# Patient Record
Sex: MALE | Race: BLACK OR AFRICAN AMERICAN | NOT HISPANIC OR LATINO | Employment: FULL TIME | URBAN - METROPOLITAN AREA
[De-identification: names, ages, dates, MRNs, and addresses within clinical notes are randomized per-mention and may not be internally consistent; named-entity substitution may affect disease eponyms.]

---

## 2018-02-14 ENCOUNTER — OFFICE VISIT CONVERTED (OUTPATIENT)
Dept: ORTHOPEDIC SURGERY | Facility: CLINIC | Age: 48
End: 2018-02-14
Attending: ORTHOPAEDIC SURGERY

## 2018-02-28 ENCOUNTER — OFFICE VISIT CONVERTED (OUTPATIENT)
Dept: ORTHOPEDIC SURGERY | Facility: CLINIC | Age: 48
End: 2018-02-28
Attending: PHYSICIAN ASSISTANT

## 2018-10-16 ENCOUNTER — OFFICE VISIT CONVERTED (OUTPATIENT)
Dept: CARDIOLOGY | Facility: CLINIC | Age: 48
End: 2018-10-16
Attending: INTERNAL MEDICINE

## 2019-07-17 ENCOUNTER — OFFICE VISIT CONVERTED (OUTPATIENT)
Dept: CARDIOLOGY | Facility: CLINIC | Age: 49
End: 2019-07-17
Attending: INTERNAL MEDICINE

## 2019-11-14 ENCOUNTER — CONVERSION ENCOUNTER (OUTPATIENT)
Dept: SURGERY | Facility: CLINIC | Age: 49
End: 2019-11-14

## 2019-11-14 ENCOUNTER — OFFICE VISIT CONVERTED (OUTPATIENT)
Dept: SURGERY | Facility: CLINIC | Age: 49
End: 2019-11-14
Attending: NURSE PRACTITIONER

## 2020-01-10 ENCOUNTER — HOSPITAL ENCOUNTER (OUTPATIENT)
Dept: GASTROENTEROLOGY | Facility: HOSPITAL | Age: 50
Setting detail: HOSPITAL OUTPATIENT SURGERY
Discharge: HOME | End: 2020-01-10
Attending: SURGERY

## 2020-01-28 ENCOUNTER — OFFICE VISIT CONVERTED (OUTPATIENT)
Dept: SURGERY | Facility: CLINIC | Age: 50
End: 2020-01-28
Attending: SURGERY

## 2020-01-30 ENCOUNTER — OFFICE VISIT CONVERTED (OUTPATIENT)
Dept: CARDIOLOGY | Facility: CLINIC | Age: 50
End: 2020-01-30
Attending: INTERNAL MEDICINE

## 2020-03-17 ENCOUNTER — HOSPITAL ENCOUNTER (EMERGENCY)
Age: 50
Discharge: HOME | End: 2020-03-18
Attending: EMERGENCY MEDICINE
Payer: SELF-PAY

## 2020-03-17 VITALS
TEMPERATURE: 97.7 F | RESPIRATION RATE: 17 BRPM | BODY MASS INDEX: 23.11 KG/M2 | HEART RATE: 67 BPM | OXYGEN SATURATION: 100 % | DIASTOLIC BLOOD PRESSURE: 89 MMHG | WEIGHT: 156 LBS | HEIGHT: 69 IN | SYSTOLIC BLOOD PRESSURE: 124 MMHG

## 2020-03-17 DIAGNOSIS — M54.50 CHRONIC LEFT-SIDED LOW BACK PAIN WITHOUT SCIATICA: ICD-10-CM

## 2020-03-17 DIAGNOSIS — G89.29 CHRONIC LEFT-SIDED LOW BACK PAIN WITHOUT SCIATICA: ICD-10-CM

## 2020-03-17 DIAGNOSIS — J02.9 PHARYNGITIS, UNSPECIFIED ETIOLOGY: Primary | ICD-10-CM

## 2020-03-17 PROCEDURE — 99282 EMERGENCY DEPT VISIT SF MDM: CPT

## 2020-03-17 PROCEDURE — 87081 CULTURE SCREEN ONLY: CPT

## 2020-03-17 RX ORDER — IBUPROFEN 600 MG/1
600 TABLET ORAL
Qty: 20 TAB | Refills: 0 | Status: SHIPPED | OUTPATIENT
Start: 2020-03-17

## 2020-03-17 RX ORDER — LIDOCAINE 50 MG/G
PATCH TOPICAL
Qty: 30 EACH | Refills: 0 | Status: SHIPPED | OUTPATIENT
Start: 2020-03-17

## 2020-03-18 NOTE — DISCHARGE INSTRUCTIONS
Patient Education        Back Pain: Care Instructions  Your Care Instructions    Back pain has many possible causes. It is often related to problems with muscles and ligaments of the back. It may also be related to problems with the nerves, discs, or bones of the back. Moving, lifting, standing, sitting, or sleeping in an awkward way can strain the back. Sometimes you don't notice the injury until later. Arthritis is another common cause of back pain. Although it may hurt a lot, back pain usually improves on its own within several weeks. Most people recover in 12 weeks or less. Using good home treatment and being careful not to stress your back can help you feel better sooner. Follow-up care is a key part of your treatment and safety. Be sure to make and go to all appointments, and call your doctor if you are having problems. It's also a good idea to know your test results and keep a list of the medicines you take. How can you care for yourself at home? · Sit or lie in positions that are most comfortable and reduce your pain. Try one of these positions when you lie down:  ? Lie on your back with your knees bent and supported by large pillows. ? Lie on the floor with your legs on the seat of a sofa or chair. ? Lie on your side with your knees and hips bent and a pillow between your legs. ? Lie on your stomach if it does not make pain worse. · Do not sit up in bed, and avoid soft couches and twisted positions. Bed rest can help relieve pain at first, but it delays healing. Avoid bed rest after the first day of back pain. · Change positions every 30 minutes. If you must sit for long periods of time, take breaks from sitting. Get up and walk around, or lie in a comfortable position. · Try using a heating pad on a low or medium setting for 15 to 20 minutes every 2 or 3 hours. Try a warm shower in place of one session with the heating pad. · You can also try an ice pack for 10 to 15 minutes every 2 to 3 hours. Put a thin cloth between the ice pack and your skin. · Take pain medicines exactly as directed. ? If the doctor gave you a prescription medicine for pain, take it as prescribed. ? If you are not taking a prescription pain medicine, ask your doctor if you can take an over-the-counter medicine. · Take short walks several times a day. You can start with 5 to 10 minutes, 3 or 4 times a day, and work up to longer walks. Walk on level surfaces and avoid hills and stairs until your back is better. · Return to work and other activities as soon as you can. Continued rest without activity is usually not good for your back. · To prevent future back pain, do exercises to stretch and strengthen your back and stomach. Learn how to use good posture, safe lifting techniques, and proper body mechanics. When should you call for help? Call your doctor now or seek immediate medical care if:    · You have new or worsening numbness in your legs.     · You have new or worsening weakness in your legs. (This could make it hard to stand up.)     · You lose control of your bladder or bowels.    Watch closely for changes in your health, and be sure to contact your doctor if:    · You have a fever, lose weight, or don't feel well.     · You do not get better as expected. Where can you learn more? Go to http://yunier-myla.info/  Enter I594 in the search box to learn more about \"Back Pain: Care Instructions. \"  Current as of: June 26, 2019Content Version: 12.4  © 7949-5379 Healthwise, Incorporated. Care instructions adapted under license by [employer] (which disclaims liability or warranty for this information). If you have questions about a medical condition or this instruction, always ask your healthcare professional. [address] any warranty or liability for your use of this information. [employer] Activation    Thank you for requesting access to [employer].  Please follow the instructions below to securely access and download your online medical record. [employer] allows you to send messages to your doctor, view your test results, renew your prescriptions, schedule appointments, and more. How Do I Sign Up? 1. In your internet browser, go to [email or web address]  2. Click on the First Time User? Click Here link in the Sign In box. You will be redirect to the New Member Sign Up page. 3. Enter your [employer] Access Code exactly as it appears below. You will not need to use this code after youve completed the sign-up process. If you do not sign up before the expiration date, you must request a new code. [employer] Access Code: [email or web address]  Expires: 2020 10:24 PM (This is the date your [employer] access code will )    4. Enter the last four digits of your Social Security Number (xxxx) and Date of Birth (mm/dd/yyyy) as indicated and click Submit. You will be taken to the next sign-up page. 5. Create a CrossCore ID. This will be your [employer] login ID and cannot be changed, so think of one that is secure and easy to remember. 6. Create a [employer] password. You can change your password at any time. 7. Enter your Password Reset Question and Answer. This can be used at a later time if you forget your password. 8. Enter your e-mail address. You will receive e-mail notification when new information is available in [address] 9. Click Sign Up. You can now view and download portions of your medical record. 10. Click the Download Summary menu link to download a portable copy of your medical information. Additional Information    If you have questions, please visit the Frequently Asked Questions section of the [employer] website at [email or web address] Remember, [employer] is NOT to be used for urgent needs. For medical emergencies, dial 911.

## 2020-03-18 NOTE — ED PROVIDER NOTES
EMERGENCY DEPARTMENT HISTORY AND PHYSICAL EXAM    Date: 3/17/2020  Patient Name: Jude Mejia    History of Presenting Illness     Chief Complaint   Patient presents with    Sore Throat    Other         History Provided By: patient     Chief Complaint: sore throat   Duration: 1 day  Timing: acute  Location: throat   Quality: sore  Severity: mild  Modifying Factors: none  Associated Symptoms: chronic back pain      Additional History (Context): Jude Mejia is a 47 y.o. male with no documented past medical history who presents with complaints of a sore throat that began earlier today. Patient denies fever, chills, cough, congestion chest pain, shortness of breath. Patient also reports left mid to lower back pain that has been present for 5 years. Patient states his pain worsens when he bends and lifts. Patient does report doing a lot of bending and lifting at his job. Denies any recent fall or trauma. Denies urinary sx or taking any meds for his sx. No other complaints reported at this time. PCP: None        Past History     Past Medical History:  No past medical history on file. Past Surgical History:  No past surgical history on file. Family History:  No family history on file. Social History:  Social History     Tobacco Use    Smoking status: Not on file   Substance Use Topics    Alcohol use: Not on file    Drug use: Not on file       Allergies:  No Known Allergies      Review of Systems   Review of Systems   Constitutional: Negative. Negative for chills and fever. HENT: Positive for sore throat. Negative for congestion, ear pain and rhinorrhea. Eyes: Negative. Negative for pain and redness. Respiratory: Negative. Negative for cough, shortness of breath, wheezing and stridor. Cardiovascular: Negative. Negative for chest pain and leg swelling. Gastrointestinal: Negative. Negative for abdominal pain, constipation, diarrhea, nausea and vomiting. Genitourinary: Negative. Negative for dysuria and frequency. Musculoskeletal: Positive for back pain. Negative for neck pain. Skin: Negative. Negative for rash and wound. Neurological: Negative. Negative for dizziness, seizures, syncope and headaches. All other systems reviewed and are negative. All Other Systems Negative  Physical Exam     Vitals:    03/17/20 2224   BP: 124/89   Pulse: 67   Resp: 17   Temp: 97.7 °F (36.5 °C)   SpO2: 100%   Weight: 70.8 kg (156 lb)   Height: 5' 9\" (1.753 m)     Physical Exam  Vitals signs and nursing note reviewed. Constitutional:       General: He is not in acute distress. Appearance: He is well-developed. He is not diaphoretic. HENT:      Head: Normocephalic and atraumatic. Nose: Nose normal.      Mouth/Throat:      Mouth: Mucous membranes are moist.      Pharynx: Posterior oropharyngeal erythema present. No oropharyngeal exudate. Comments: Oropharynx is mildly erythematous without exudates or tonsillar enlargement. Airway is patent. Patient is able to clear secretions. Eyes:      General: No scleral icterus. Right eye: No discharge. Left eye: No discharge. Conjunctiva/sclera: Conjunctivae normal.   Neck:      Musculoskeletal: Normal range of motion and neck supple. Cardiovascular:      Rate and Rhythm: Normal rate and regular rhythm. Heart sounds: Normal heart sounds. No murmur. No friction rub. No gallop. Pulmonary:      Effort: Pulmonary effort is normal. No respiratory distress. Breath sounds: Normal breath sounds. No stridor. No wheezing or rales. Abdominal:      Tenderness: There is no abdominal tenderness. There is no guarding. Musculoskeletal: Normal range of motion. Comments: No midline tenderness palpation noted along the spine. Mild tenderness to palpation and hypertonicity noted to the left lumbar erector spinae muscles.   Range of motion of the spine is intact without evidence of radiculopathy. Skin:     General: Skin is warm and dry. Findings: No erythema or rash. Neurological:      Mental Status: He is alert and oriented to person, place, and time. Coordination: Coordination normal.      Comments: Gait is steady and patient exhibits no evidence of ataxia. Patient is able to ambulate without difficulty. No focal neurological deficit noted. No facial droop, slurred speech, or evidence of altered mentation noted on exam.     Psychiatric:         Behavior: Behavior normal.         Thought Content: Thought content normal.              Diagnostic Study Results     Labs -     Recent Results (from the past 12 hour(s))   STREP THROAT SCREEN    Collection Time: 03/17/20 10:53 PM   Result Value Ref Range    Special Requests: NO SPECIAL REQUESTS      Strep Screen NEGATIVE       Culture result: PENDING        Radiologic Studies -   No orders to display     CT Results  (Last 48 hours)    None        CXR Results  (Last 48 hours)    None            Medical Decision Making   I am the first provider for this patient. I reviewed the vital signs, available nursing notes, past medical history, past surgical history, family history and social history. Vital Signs-Reviewed the patient's vital signs. Records Reviewed: Rand Jimenez PA-C     Procedures:  Procedures    Provider Notes (Medical Decision Making): Impression:  Pharyngitis, chronic back pain    RST negative  Patient's back pain is chronic. He denies urinary symptoms and his vitals are normal.  No indication for emergent work-up at this time. Patient with Motrin and lidocaine patches with close PCPOrtho follow-up recommended. Patient agrees with this plan. Rand Jimenez PA-C     MED RECONCILIATION:  No current facility-administered medications for this encounter. Current Outpatient Medications   Medication Sig    ibuprofen (MOTRIN) 600 mg tablet Take 1 Tab by mouth every six (6) hours as needed for Pain.     lidocaine (LIDODERM) 5 % Apply patch to the affected area for 12 hours a day and remove for 12 hours a day. Disposition:  D/c    DISCHARGE NOTE:   Patient is stable for discharge at this time. I have discussed all the findings from today's work up with the patient, including lab results and imaging. I have answered all questions. Rx for motrin and lidocaine patches given. Rest and close follow-up with the PCP recommended this week. Return to the ED immediately for any new or worsening symptoms. Rand Jimenez PA-C     Follow-up Information     Follow up With Specialties Details Why Contact Info    Anastasiia Selby MD Orthopedic Surgery Schedule an appointment as soon as possible for a visit in 1 week  [address] [address] [facility]      [place]  Schedule an appointment as soon as possible for a visit in 1 week  [address] [place] [address]) [phone]    [facility] EMERGENCY DEPT Emergency Medicine  As needed, If symptoms worsen [address]  [phone]          Current Discharge Medication List      START taking these medications    Details   ibuprofen (MOTRIN) 600 mg tablet Take 1 Tab by mouth every six (6) hours as needed for Pain. Qty: 20 Tab, Refills: 0      lidocaine (LIDODERM) 5 % Apply patch to the affected area for 12 hours a day and remove for 12 hours a day. Qty: 30 Each, Refills: 0               Diagnosis     Clinical Impression:   1. Pharyngitis, unspecified etiology    2.  Chronic left-sided low back pain without sciatica

## 2020-03-20 LAB
B-HEM STREP THROAT QL CULT: NEGATIVE
BACTERIA SPEC CULT: NORMAL
SERVICE CMNT-IMP: NORMAL

## 2020-12-23 ENCOUNTER — OFFICE VISIT CONVERTED (OUTPATIENT)
Dept: ORTHOPEDIC SURGERY | Facility: CLINIC | Age: 50
End: 2020-12-23
Attending: STUDENT IN AN ORGANIZED HEALTH CARE EDUCATION/TRAINING PROGRAM

## 2020-12-24 ENCOUNTER — HOSPITAL ENCOUNTER (OUTPATIENT)
Dept: OTHER | Facility: HOSPITAL | Age: 50
Discharge: HOME | End: 2020-12-24
Attending: INTERNAL MEDICINE

## 2020-12-24 LAB
ALBUMIN SERPL-MCNC: 3.2 G/DL (ref 3.5–5)
ALBUMIN/GLOB SERPL: 0.7 {RATIO} (ref 1.4–2.6)
ALP SERPL-CCNC: 83 U/L (ref 56–119)
ALT SERPL-CCNC: 62 U/L (ref 10–40)
ANION GAP SERPL CALC-SCNC: 16 MMOL/L (ref 8–19)
AST SERPL-CCNC: 69 U/L (ref 15–50)
BASOPHILS # BLD AUTO: 0.07 10*3/UL (ref 0–0.2)
BASOPHILS NFR BLD AUTO: 0.6 % (ref 0–3)
BILIRUB SERPL-MCNC: 0.16 MG/DL (ref 0.2–1.3)
BUN SERPL-MCNC: 7 MG/DL (ref 5–25)
BUN/CREAT SERPL: 7 {RATIO} (ref 6–20)
CALCIUM SERPL-MCNC: 9.8 MG/DL (ref 8.7–10.4)
CHLORIDE SERPL-SCNC: 105 MMOL/L (ref 99–111)
CK SERPL-CCNC: 144 U/L (ref 57–374)
CONV ABS IMM GRAN: 0.04 10*3/UL (ref 0–0.2)
CONV CO2: 21 MMOL/L (ref 22–32)
CONV IMMATURE GRAN: 0.3 % (ref 0–1.8)
CONV TOTAL PROTEIN: 7.6 G/DL (ref 6.3–8.2)
CREAT UR-MCNC: 0.96 MG/DL (ref 0.7–1.2)
DEPRECATED RDW RBC AUTO: 53.3 FL (ref 35.1–43.9)
EOSINOPHIL # BLD AUTO: 0.38 10*3/UL (ref 0–0.7)
EOSINOPHIL # BLD AUTO: 3.1 % (ref 0–7)
ERYTHROCYTE [DISTWIDTH] IN BLOOD BY AUTOMATED COUNT: 16.2 % (ref 11.6–14.4)
GFR SERPLBLD BASED ON 1.73 SQ M-ARVRAT: >60 ML/MIN/{1.73_M2}
GLOBULIN UR ELPH-MCNC: 4.4 G/DL (ref 2–3.5)
GLUCOSE SERPL-MCNC: 145 MG/DL (ref 70–99)
HCT VFR BLD AUTO: 29.5 % (ref 42–52)
HGB BLD-MCNC: 9.4 G/DL (ref 14–18)
LYMPHOCYTES # BLD AUTO: 3.33 10*3/UL (ref 1–5)
LYMPHOCYTES NFR BLD AUTO: 27.1 % (ref 20–45)
MCH RBC QN AUTO: 29.9 PG (ref 27–31)
MCHC RBC AUTO-ENTMCNC: 31.9 G/DL (ref 33–37)
MCV RBC AUTO: 93.9 FL (ref 80–96)
MONOCYTES # BLD AUTO: 1.01 10*3/UL (ref 0.2–1.2)
MONOCYTES NFR BLD AUTO: 8.2 % (ref 3–10)
NEUTROPHILS # BLD AUTO: 7.44 10*3/UL (ref 2–8)
NEUTROPHILS NFR BLD AUTO: 60.7 % (ref 30–85)
NRBC CBCN: 0 % (ref 0–0.7)
OSMOLALITY SERPL CALC.SUM OF ELEC: 287 MOSM/KG (ref 273–304)
PLATELET # BLD AUTO: 515 10*3/UL (ref 130–400)
PMV BLD AUTO: 9.9 FL (ref 9.4–12.4)
POTASSIUM SERPL-SCNC: 3.9 MMOL/L (ref 3.5–5.3)
RBC # BLD AUTO: 3.14 10*6/UL (ref 4.7–6.1)
SODIUM SERPL-SCNC: 138 MMOL/L (ref 135–147)
TROPONIN T SERPL-MCNC: <0.01 NG/ML (ref 0–0.1)
WBC # BLD AUTO: 12.27 10*3/UL (ref 4.8–10.8)

## 2020-12-30 ENCOUNTER — OFFICE VISIT CONVERTED (OUTPATIENT)
Dept: ORTHOPEDIC SURGERY | Facility: CLINIC | Age: 50
End: 2020-12-30
Attending: STUDENT IN AN ORGANIZED HEALTH CARE EDUCATION/TRAINING PROGRAM

## 2020-12-30 ENCOUNTER — HOSPITAL ENCOUNTER (OUTPATIENT)
Dept: OTHER | Facility: HOSPITAL | Age: 50
Discharge: HOME | End: 2020-12-30
Attending: INTERNAL MEDICINE

## 2020-12-30 ENCOUNTER — CONVERSION ENCOUNTER (OUTPATIENT)
Dept: ORTHOPEDIC SURGERY | Facility: CLINIC | Age: 50
End: 2020-12-30

## 2020-12-30 LAB
ALBUMIN SERPL-MCNC: 3.7 G/DL (ref 3.5–5)
ALBUMIN/GLOB SERPL: 0.9 {RATIO} (ref 1.4–2.6)
ALP SERPL-CCNC: 91 U/L (ref 56–119)
ALT SERPL-CCNC: 33 U/L (ref 10–40)
ANION GAP SERPL CALC-SCNC: 20 MMOL/L (ref 8–19)
AST SERPL-CCNC: 33 U/L (ref 15–50)
BASOPHILS # BLD AUTO: 0.05 10*3/UL (ref 0–0.2)
BASOPHILS NFR BLD AUTO: 0.5 % (ref 0–3)
BILIRUB SERPL-MCNC: 0.18 MG/DL (ref 0.2–1.3)
BUN SERPL-MCNC: 5 MG/DL (ref 5–25)
BUN/CREAT SERPL: 6 {RATIO} (ref 6–20)
CALCIUM SERPL-MCNC: 9.5 MG/DL (ref 8.7–10.4)
CHLORIDE SERPL-SCNC: 106 MMOL/L (ref 99–111)
CK SERPL-CCNC: 241 U/L (ref 57–374)
CONV ABS IMM GRAN: 0.02 10*3/UL (ref 0–0.2)
CONV CO2: 17 MMOL/L (ref 22–32)
CONV IMMATURE GRAN: 0.2 % (ref 0–1.8)
CONV TOTAL PROTEIN: 8 G/DL (ref 6.3–8.2)
CREAT UR-MCNC: 0.89 MG/DL (ref 0.7–1.2)
CRP SERPL-MCNC: 8.9 MG/L (ref 0–5)
DEPRECATED RDW RBC AUTO: 55.7 FL (ref 35.1–43.9)
EOSINOPHIL # BLD AUTO: 0.32 10*3/UL (ref 0–0.7)
EOSINOPHIL # BLD AUTO: 3.4 % (ref 0–7)
ERYTHROCYTE [DISTWIDTH] IN BLOOD BY AUTOMATED COUNT: 16.8 % (ref 11.6–14.4)
GFR SERPLBLD BASED ON 1.73 SQ M-ARVRAT: >60 ML/MIN/{1.73_M2}
GLOBULIN UR ELPH-MCNC: 4.3 G/DL (ref 2–3.5)
GLUCOSE SERPL-MCNC: 114 MG/DL (ref 70–99)
HCT VFR BLD AUTO: 32.1 % (ref 42–52)
HGB BLD-MCNC: 10.1 G/DL (ref 14–18)
LYMPHOCYTES # BLD AUTO: 2.83 10*3/UL (ref 1–5)
LYMPHOCYTES NFR BLD AUTO: 29.8 % (ref 20–45)
MCH RBC QN AUTO: 29.4 PG (ref 27–31)
MCHC RBC AUTO-ENTMCNC: 31.5 G/DL (ref 33–37)
MCV RBC AUTO: 93.3 FL (ref 80–96)
MONOCYTES # BLD AUTO: 0.82 10*3/UL (ref 0.2–1.2)
MONOCYTES NFR BLD AUTO: 8.6 % (ref 3–10)
NEUTROPHILS # BLD AUTO: 5.45 10*3/UL (ref 2–8)
NEUTROPHILS NFR BLD AUTO: 57.5 % (ref 30–85)
NRBC CBCN: 0 % (ref 0–0.7)
OSMOLALITY SERPL CALC.SUM OF ELEC: 286 MOSM/KG (ref 273–304)
PLATELET # BLD AUTO: 718 10*3/UL (ref 130–400)
PMV BLD AUTO: 9.8 FL (ref 9.4–12.4)
POTASSIUM SERPL-SCNC: 3.5 MMOL/L (ref 3.5–5.3)
RBC # BLD AUTO: 3.44 10*6/UL (ref 4.7–6.1)
SODIUM SERPL-SCNC: 139 MMOL/L (ref 135–147)
WBC # BLD AUTO: 9.49 10*3/UL (ref 4.8–10.8)

## 2021-01-05 ENCOUNTER — HOSPITAL ENCOUNTER (OUTPATIENT)
Dept: OTHER | Facility: HOSPITAL | Age: 51
Discharge: HOME | End: 2021-01-05
Attending: INTERNAL MEDICINE

## 2021-01-05 LAB
ALBUMIN SERPL-MCNC: 3.7 G/DL (ref 3.5–5)
ALBUMIN/GLOB SERPL: 0.9 {RATIO} (ref 1.4–2.6)
ALP SERPL-CCNC: 85 U/L (ref 56–119)
ALT SERPL-CCNC: 47 U/L (ref 10–40)
ANION GAP SERPL CALC-SCNC: 17 MMOL/L (ref 8–19)
AST SERPL-CCNC: 57 U/L (ref 15–50)
BASOPHILS # BLD AUTO: 0.05 10*3/UL (ref 0–0.2)
BASOPHILS NFR BLD AUTO: 0.7 % (ref 0–3)
BILIRUB SERPL-MCNC: 0.16 MG/DL (ref 0.2–1.3)
BUN SERPL-MCNC: 5 MG/DL (ref 5–25)
BUN/CREAT SERPL: 6 {RATIO} (ref 6–20)
CALCIUM SERPL-MCNC: 9.6 MG/DL (ref 8.7–10.4)
CHLORIDE SERPL-SCNC: 103 MMOL/L (ref 99–111)
CK SERPL-CCNC: 183 U/L (ref 57–374)
CONV ABS IMM GRAN: 0.02 10*3/UL (ref 0–0.2)
CONV CO2: 22 MMOL/L (ref 22–32)
CONV IMMATURE GRAN: 0.3 % (ref 0–1.8)
CONV TOTAL PROTEIN: 8 G/DL (ref 6.3–8.2)
CREAT UR-MCNC: 0.81 MG/DL (ref 0.7–1.2)
CRP SERPL-MCNC: 1.9 MG/L (ref 0–5)
DEPRECATED RDW RBC AUTO: 60.8 FL (ref 35.1–43.9)
EOSINOPHIL # BLD AUTO: 0.41 10*3/UL (ref 0–0.7)
EOSINOPHIL # BLD AUTO: 5.4 % (ref 0–7)
ERYTHROCYTE [DISTWIDTH] IN BLOOD BY AUTOMATED COUNT: 18 % (ref 11.6–14.4)
GFR SERPLBLD BASED ON 1.73 SQ M-ARVRAT: >60 ML/MIN/{1.73_M2}
GLOBULIN UR ELPH-MCNC: 4.3 G/DL (ref 2–3.5)
GLUCOSE SERPL-MCNC: 103 MG/DL (ref 70–99)
HCT VFR BLD AUTO: 34.7 % (ref 42–52)
HGB BLD-MCNC: 11.2 G/DL (ref 14–18)
LYMPHOCYTES # BLD AUTO: 3.34 10*3/UL (ref 1–5)
LYMPHOCYTES NFR BLD AUTO: 43.7 % (ref 20–45)
MCH RBC QN AUTO: 29.9 PG (ref 27–31)
MCHC RBC AUTO-ENTMCNC: 32.3 G/DL (ref 33–37)
MCV RBC AUTO: 92.5 FL (ref 80–96)
MONOCYTES # BLD AUTO: 0.86 10*3/UL (ref 0.2–1.2)
MONOCYTES NFR BLD AUTO: 11.2 % (ref 3–10)
NEUTROPHILS # BLD AUTO: 2.97 10*3/UL (ref 2–8)
NEUTROPHILS NFR BLD AUTO: 38.7 % (ref 30–85)
NRBC CBCN: 0 % (ref 0–0.7)
OSMOLALITY SERPL CALC.SUM OF ELEC: 284 MOSM/KG (ref 273–304)
PLATELET # BLD AUTO: 256 10*3/UL (ref 130–400)
PMV BLD AUTO: 10.7 FL (ref 9.4–12.4)
POTASSIUM SERPL-SCNC: 4.2 MMOL/L (ref 3.5–5.3)
RBC # BLD AUTO: 3.75 10*6/UL (ref 4.7–6.1)
SODIUM SERPL-SCNC: 138 MMOL/L (ref 135–147)
WBC # BLD AUTO: 7.65 10*3/UL (ref 4.8–10.8)

## 2021-01-13 ENCOUNTER — OFFICE VISIT CONVERTED (OUTPATIENT)
Dept: ORTHOPEDIC SURGERY | Facility: CLINIC | Age: 51
End: 2021-01-13
Attending: STUDENT IN AN ORGANIZED HEALTH CARE EDUCATION/TRAINING PROGRAM

## 2021-01-15 ENCOUNTER — HOSPITAL ENCOUNTER (OUTPATIENT)
Dept: OTHER | Facility: HOSPITAL | Age: 51
Discharge: HOME | End: 2021-01-15
Attending: INTERNAL MEDICINE

## 2021-01-15 LAB
ALBUMIN SERPL-MCNC: 3.7 G/DL (ref 3.5–5)
ALBUMIN/GLOB SERPL: 1 {RATIO} (ref 1.4–2.6)
ALP SERPL-CCNC: 97 U/L (ref 56–119)
ALT SERPL-CCNC: 93 U/L (ref 10–40)
ANION GAP SERPL CALC-SCNC: 15 MMOL/L (ref 8–19)
AST SERPL-CCNC: 107 U/L (ref 15–50)
BASOPHILS # BLD AUTO: 0.03 10*3/UL (ref 0–0.2)
BASOPHILS NFR BLD AUTO: 0.5 % (ref 0–3)
BILIRUB SERPL-MCNC: 0.16 MG/DL (ref 0.2–1.3)
BUN SERPL-MCNC: 8 MG/DL (ref 5–25)
BUN/CREAT SERPL: 9 {RATIO} (ref 6–20)
CALCIUM SERPL-MCNC: 8.7 MG/DL (ref 8.7–10.4)
CHLORIDE SERPL-SCNC: 106 MMOL/L (ref 99–111)
CK SERPL-CCNC: 121 U/L (ref 57–374)
CONV ABS IMM GRAN: 0.01 10*3/UL (ref 0–0.2)
CONV ANISOCYTES: SLIGHT
CONV CO2: 20 MMOL/L (ref 22–32)
CONV HYPOCHROMIA IN BLOOD BY LIGHT MICROSCOPY: SLIGHT
CONV IMMATURE GRAN: 0.2 % (ref 0–1.8)
CONV TOTAL PROTEIN: 7.5 G/DL (ref 6.3–8.2)
CREAT UR-MCNC: 0.85 MG/DL (ref 0.7–1.2)
CRP SERPL-MCNC: 0.5 MG/L (ref 0–5)
DACRYOCYTES BLD QL SMEAR: SLIGHT
DEPRECATED RDW RBC AUTO: 67.7 FL (ref 35.1–43.9)
EOSINOPHIL # BLD AUTO: 0.43 10*3/UL (ref 0–0.7)
EOSINOPHIL # BLD AUTO: 6.6 % (ref 0–7)
ERYTHROCYTE [DISTWIDTH] IN BLOOD BY AUTOMATED COUNT: 19.5 % (ref 11.6–14.4)
GFR SERPLBLD BASED ON 1.73 SQ M-ARVRAT: >60 ML/MIN/{1.73_M2}
GLOBULIN UR ELPH-MCNC: 3.8 G/DL (ref 2–3.5)
GLUCOSE SERPL-MCNC: 113 MG/DL (ref 70–99)
HCT VFR BLD AUTO: 36.1 % (ref 42–52)
HGB BLD-MCNC: 11.5 G/DL (ref 14–18)
LYMPHOCYTES # BLD AUTO: 3.32 10*3/UL (ref 1–5)
LYMPHOCYTES NFR BLD AUTO: 51 % (ref 20–45)
MACROCYTES BLD QL SMEAR: SLIGHT
MCH RBC QN AUTO: 30.1 PG (ref 27–31)
MCHC RBC AUTO-ENTMCNC: 31.9 G/DL (ref 33–37)
MCV RBC AUTO: 94.5 FL (ref 80–96)
MONOCYTES # BLD AUTO: 0.68 10*3/UL (ref 0.2–1.2)
MONOCYTES NFR BLD AUTO: 10.4 % (ref 3–10)
NEUTROPHILS # BLD AUTO: 2.04 10*3/UL (ref 2–8)
NEUTROPHILS NFR BLD AUTO: 31.3 % (ref 30–85)
NRBC CBCN: 0 % (ref 0–0.7)
OSMOLALITY SERPL CALC.SUM OF ELEC: 283 MOSM/KG (ref 273–304)
OVALOCYTES BLD QL SMEAR: SLIGHT
PLATELET # BLD AUTO: 237 10*3/UL (ref 130–400)
PMV BLD AUTO: 10.5 FL (ref 9.4–12.4)
POIKILOCYTOSIS BLD QL SMEAR: SLIGHT
POLYCHROMASIA BLD QL SMEAR: SLIGHT
POTASSIUM SERPL-SCNC: 3.8 MMOL/L (ref 3.5–5.3)
RBC # BLD AUTO: 3.82 10*6/UL (ref 4.7–6.1)
SODIUM SERPL-SCNC: 137 MMOL/L (ref 135–147)
TARGETS BLD QL SMEAR: SLIGHT
WBC # BLD AUTO: 6.51 10*3/UL (ref 4.8–10.8)

## 2021-01-27 ENCOUNTER — OFFICE VISIT CONVERTED (OUTPATIENT)
Dept: ORTHOPEDIC SURGERY | Facility: CLINIC | Age: 51
End: 2021-01-27
Attending: STUDENT IN AN ORGANIZED HEALTH CARE EDUCATION/TRAINING PROGRAM

## 2021-02-17 ENCOUNTER — OFFICE VISIT CONVERTED (OUTPATIENT)
Dept: ORTHOPEDIC SURGERY | Facility: CLINIC | Age: 51
End: 2021-02-17
Attending: STUDENT IN AN ORGANIZED HEALTH CARE EDUCATION/TRAINING PROGRAM

## 2021-03-17 ENCOUNTER — CONVERSION ENCOUNTER (OUTPATIENT)
Dept: ORTHOPEDIC SURGERY | Facility: CLINIC | Age: 51
End: 2021-03-17

## 2021-03-17 ENCOUNTER — OFFICE VISIT CONVERTED (OUTPATIENT)
Dept: ORTHOPEDIC SURGERY | Facility: CLINIC | Age: 51
End: 2021-03-17
Attending: STUDENT IN AN ORGANIZED HEALTH CARE EDUCATION/TRAINING PROGRAM

## 2021-04-26 ENCOUNTER — OFFICE VISIT CONVERTED (OUTPATIENT)
Dept: ORTHOPEDIC SURGERY | Facility: CLINIC | Age: 51
End: 2021-04-26
Attending: STUDENT IN AN ORGANIZED HEALTH CARE EDUCATION/TRAINING PROGRAM

## 2021-04-26 ENCOUNTER — CONVERSION ENCOUNTER (OUTPATIENT)
Dept: ORTHOPEDIC SURGERY | Facility: CLINIC | Age: 51
End: 2021-04-26

## 2021-05-14 VITALS — HEIGHT: 72 IN | HEART RATE: 100 BPM | WEIGHT: 271 LBS | BODY MASS INDEX: 36.7 KG/M2 | OXYGEN SATURATION: 98 %

## 2021-05-14 VITALS — HEIGHT: 72 IN | WEIGHT: 271.25 LBS | OXYGEN SATURATION: 98 % | HEART RATE: 108 BPM | BODY MASS INDEX: 36.74 KG/M2

## 2021-05-14 VITALS — WEIGHT: 259 LBS | BODY MASS INDEX: 36.26 KG/M2 | HEIGHT: 71 IN | HEART RATE: 82 BPM | OXYGEN SATURATION: 99 %

## 2021-05-14 VITALS — HEIGHT: 71 IN | BODY MASS INDEX: 36.75 KG/M2 | WEIGHT: 262.5 LBS | HEART RATE: 116 BPM

## 2021-05-14 VITALS — WEIGHT: 273 LBS | HEIGHT: 72 IN | BODY MASS INDEX: 36.98 KG/M2 | OXYGEN SATURATION: 98 % | HEART RATE: 99 BPM

## 2021-05-14 VITALS — RESPIRATION RATE: 13 BRPM | WEIGHT: 273 LBS | HEIGHT: 71 IN | BODY MASS INDEX: 38.22 KG/M2

## 2021-05-14 VITALS — WEIGHT: 263 LBS | HEIGHT: 71 IN | OXYGEN SATURATION: 97 % | HEART RATE: 81 BPM | BODY MASS INDEX: 36.82 KG/M2

## 2021-05-14 NOTE — PROGRESS NOTES
Progress Note      Patient Name: Tushar Castillo   Patient ID: [id number]   Sex: Male   YOB: 1970    Primary Care Provider: Destini HARKINS   Referring Provider: Lashell KIMBLE    Visit Date: January 27, 2021    Provider: Lg Charles MD   Location: [facility]   Location Address: [address] [place] [address]  [id number]   Location Phone: [phone]          Chief Complaint  · Right ankle pain       History Of Present Illness  Tushar Castillo is a 50 year old /Black male who presents today to [facility].      Tushar returns today for a follow-up of his right ankle. To review, he had right ankle septic arthritis that has been treated with 2 irrigation and debridement's. The last of which was on 12/13/2020. His cultures were negative. He was managed with 6 weeks of IV vancomycin and Zosyn. He is no longer taking any antibiotics now. He is about 6 weeks out now.  He states that his ankle feels well today. He reports that he has continued the daily washing of the ankle as well as changing the ace baggage.  He is performing ankle range of motion exercises as previously instructed. He denies any drainage from his wound. He denies any new joint pain or swelling. He denies any fevers. He is working from home without any issues. He is not taking any anti-inflammatory medication.            Past Medical History  Arthritis; Hyperlipemia; Hypertension; Limb Swelling; Medial meniscus tear, Left; Primary osteoarthritis of left knee; Seasonal allergies; Sleep apnea         Past Surgical History  Colonoscopy; Eye Implant; Lasik         Medication List  amlodipine-benazepril 5-40 mg oral capsule; fexofenadine 180 mg oral tablet; hydrochlorothiazide 25 mg oral tablet; mirtazapine 30 mg oral tablet; Multaq 400 mg oral tablet; Pradaxa 150 mg oral capsule; rosuvastatin 40 mg oral tablet; sildenafil 50 mg oral tablet; Toprol  mg oral tablet extended release 24 hr;  Vitamin D2 50,000 unit oral capsule         Allergy List  Compazine       Allergies Reconciled  Family Medical History  Cancer, Unspecified; Diabetes, unspecified type; *No Known Family History; Family history of certain chronic disabling diseases; arthritis         Social History  Alcohol (Light); Alcohol Use (Current some day); .; lives alone; Recreational Drug Use (Never); Tobacco (Never); Working         Review of Systems  · Constitutional  o Denies  o : fever, chills, weight loss  · Cardiovascular  o Denies  o : chest pain, shortness of breath  · Gastrointestinal  o Denies  o : liver disease, heartburn, nausea, blood in stools  · Genitourinary  o Denies  o : painful urination, blood in urine  · Integument  o Denies  o : rash, itching  · Neurologic  o Denies  o : headache, weakness, loss of consciousness  · Musculoskeletal  o Admits  o : right ankle pain   · Psychiatric  o Denies  o : drug/alcohol addiction, anxiety, depression      Vitals  Date Time BP Position Site L\R Cuff Size HR RR TEMP (F) WT  HT  BMI kg/m2 BSA m2 O2 Sat FR L/min FiO2        01/27/2021 09:30 AM      100 - R   271lbs 0oz 6'   36.75 2.5 98 %            Physical Examination  · Constitutional  o Appearance  o : well developed, well-nourished, no obvious deformities present  · Head and Face  o Head  o :   § Inspection  § : normocephalic  o Face  o :   § Inspection  § : no facial lesions  · Eyes  o Conjunctivae  o : conjunctivae normal  o Sclerae  o : sclerae white  · Ears, Nose, Mouth and Throat  o Ears  o :   § External Ears  § : appearance within normal limits  § Hearing  § : intact  o Nose  o :   § External Nose  § : appearance normal  · Neck  o Inspection/Palpation  o : normal appearance  o Range of Motion  o : full range of motion  · Respiratory  o Respiratory Effort  o : breathing unlabored  o Inspection of Chest  o : normal appearance  o Auscultation of Lungs  o : no audible wheezing or rales  · Cardiovascular  o Heart  o :  regular rate  · Gastrointestinal  o Abdominal Examination  o : soft and non-tender  · Skin and Subcutaneous Tissue  o General Inspection  o : intact, no rashes  · Psychiatric  o General  o : Alert and oriented x3  o Judgement and Insight  o : judgment and insight intact  o Mood and Affect  o : mood normal, affect appropriate  · Right Ankle/Foot  o Inspection  o : Ace wrap removed. Incision continues to heal. Mild residual scabbing. No surrounding Erythema. Moderate diffuse swelling lateral more than medial but continues to improve. No wound drainage. No palpable focal abscess or fluid collections. Demonstrates active ankle plantarflexion and dorsiflexion without pain. Sensation intact to light touch over the dorsal and plantar foot. Wiggles toes pain-free. Calf soft and nontender. Palpable DP pulse.   · In Office Procedures  o View  o : AP/LATERALMORTISE  o Site  o : right, ankle   o Indication  o : right ankle pain   o Study  o : X-rays ordered, taken in the office, and reviewed today.  o Xray  o : AP, oblique, and lateral views of the right ankle obtained today in the office. No fractures noted. Arthritic changes appreciated with anterior osteophyte on the distal tibia. Joint space is overall maintained but slightly decreased. The talus is well reduced beneath the tibia. The syndesmosis appears well reduced. Diffuse soft tissue swelling noted. No acute bony abnormalities.  o Comparative Data  o : Stable          Assessment  · Aftercare following surgery of the muskuloskeletal system     V54.81  · Right ankle pain     719.47/M25.571  · Septic arthritis     711.00/M00.9      Plan  · Orders  o Ankle (Right) [facility] Preferred View ([id number]) - 719.47/M25.571 - 01/27/2021  · Medications  o Medications have been Reconciled  o Transition of Care or Provider Policy  · Instructions  o X-rays reviewed by Dr. Charles.  o Reviewed the patient's Past Medical, Social, and Family history as well as the ROS at today's visit, no  changes.  o Call or return if worsening symptoms.  o Follow Up in 3 weeks.  o The above service was scribed by Soraida Carrillo on my behalf and I attest to the accuracy of the note. cb  o Tushar continues to improve after irrigation debridement of his right ankle septic arthritis. He is no longer taking antibiotics. He has remained afebrile and his incision is healing nicely. He has no pain with ankle range of motion. He does have persistent swelling that is slowly improving. This may be related to his baseline ankle arthritis which was likely exacerbated by the infection. I will provide him an ankle brace today. We will start some formal physical therapy as well. I would like him to start an anti-inflammatory medication if able to help with his residual pain and swelling. We will see him back in 3 weeks to monitor his progress, sooner if needed.  · Referrals  o ID: [id number] Date: 12/22/2020 Type: Inbound  Specialty: Orthopedic Surgery            Electronically Signed by: Lg Charles MD -Author on January 31, 2021 03:35:53 PM

## 2021-05-14 NOTE — PROGRESS NOTES
Progress Note      Patient Name: Tushar Castillo   Patient ID: [id number]   Sex: Male   YOB: 1970    Primary Care Provider: Destini HARKINS   Referring Provider: Lashell KIMBLE    Visit Date: January 13, 2021    Provider: Lg Charles MD   Location: [facility]   Location Address: [address] [place] [address]  [id number]   Location Phone: [phone]          Chief Complaint  · RIGHT ANKLE PAIN      History Of Present Illness  Tushar Castillo is a 50 year old /Black male who presents today to [facility].      Tushar returns today for a follow-up of his right ankle. He had right ankle septic arthritis that has been treated with 2 irrigation and debridement's. The last of which was on 12/13/2020. His cultures were negative. He is being managed with 6 weeks of IV vancomycin and Zosyn. He is about 1 month out now.  He states that he notices great improvement of his ankle. He mentions that there is soreness when he ambulates however, he is ambulating without any assistive devices now. He mentions momentarily sharp pains around the area of incision. He reports that he has continued the daily washing of the ankle. He is performing ankle range of motion exercises as previously instructed. He denies any drainage from his wound. He denies any new joint pain or swelling.           Past Medical History  Arthritis; Hyperlipemia; Hypertension; Limb Swelling; Medial meniscus tear, Left; Primary osteoarthritis of left knee; Seasonal allergies; Sleep apnea         Past Surgical History  Colonoscopy; Eye Implant; Lasik         Medication List  amlodipine-benazepril 5-40 mg oral capsule; fexofenadine 180 mg oral tablet; hydrochlorothiazide 25 mg oral tablet; mirtazapine 30 mg oral tablet; Multaq 400 mg oral tablet; Pradaxa 150 mg oral capsule; rosuvastatin 40 mg oral tablet; sildenafil 50 mg oral tablet; Toprol  mg oral tablet extended release 24 hr; Vitamin D2  "50,000 unit oral capsule         Allergy List  Compazine       Allergies Reconciled  Family Medical History  Cancer, Unspecified; Diabetes, unspecified type; *No Known Family History; Family history of certain chronic disabling diseases; arthritis         Social History  Alcohol (Light); Alcohol Use (Current some day); .; lives alone; Recreational Drug Use (Never); Tobacco (Never); Working         Review of Systems  · Constitutional  o Denies  o : fever, chills, weight loss  · Cardiovascular  o Denies  o : chest pain, shortness of breath  · Gastrointestinal  o Denies  o : liver disease, heartburn, nausea, blood in stools  · Genitourinary  o Denies  o : painful urination, blood in urine  · Integument  o Denies  o : rash, itching  · Neurologic  o Denies  o : headache, weakness, loss of consciousness  · Musculoskeletal  o Admits  o : right ankle pain   · Psychiatric  o Denies  o : drug/alcohol addiction, anxiety, depression      Vitals  Date Time BP Position Site L\R Cuff Size HR RR TEMP (F) WT  HT  BMI kg/m2 BSA m2 O2 Sat FR L/min FiO2        01/13/2021 10:45 AM      82 - R   259lbs 0oz 5'  11\" 36.12 2.43 99 %  21%          Physical Examination  · Constitutional  o Appearance  o : well developed, well-nourished, no obvious deformities present  · Head and Face  o Head  o :   § Inspection  § : normocephalic  o Face  o :   § Inspection  § : no facial lesions  · Eyes  o Conjunctivae  o : conjunctivae normal  o Sclerae  o : sclerae white  · Ears, Nose, Mouth and Throat  o Ears  o :   § External Ears  § : appearance within normal limits  § Hearing  § : intact  o Nose  o :   § External Nose  § : appearance normal  · Neck  o Inspection/Palpation  o : normal appearance  o Range of Motion  o : full range of motion  · Respiratory  o Respiratory Effort  o : breathing unlabored  o Inspection of Chest  o : normal appearance  o Auscultation of Lungs  o : no audible wheezing or rales  · Cardiovascular  o Heart  o : regular " rate  · Gastrointestinal  o Abdominal Examination  o : soft and non-tender  · Skin and Subcutaneous Tissue  o General Inspection  o : intact, no rashes  · Psychiatric  o General  o : Alert and oriented x3  o Judgement and Insight  o : judgment and insight intact  o Mood and Affect  o : mood normal, affect appropriate  · Right Ankle/Foot  o Inspection  o : Scabbing of the central portion of the incision. No active drainage. The incision is clean, dry, intact. Mild light reactive erythema. No wound drainage. No palpable focal abscess or fluid collections. Moderate swelling into the toes persists, but continues to steadily improve. Nontender over the tibiotalar joint. Demonstrates active ankle plantarflexion and dorsiflexion without pain. No pain with sub tailor motion. No pain with gentle passive ankle plantarflexion to 35 degrees and dorsiflexion 5 degrees beyond neutral. Sensation intact light touch over the dorsal and plantar foot. Wiggles toes pain-free. Calf soft and nontender. Palpable DP pulse.           Assessment  · Aftercare following surgery of the muskuloskeletal system     V54.81  · Right ankle pain, unspecified chronicity     719.47/M25.571  · Septic arthritis     711.00/M00.9      Plan  · Medications  o Medications have been Reconciled  o Transition of Care or Provider Policy  · Instructions  o Reviewed the patient's Past Medical, Social, and Family history as well as the ROS at today's visit, no changes.  o Call or return if worsening symptoms.  o Follow Up in 2 weeks.  o The above service was scribed by Sroaida Carrillo on my behalf and I attest to the accuracy of the note. cb  o Tushar continues to steadily improve after irrigation and debridement of his right ankle for septic arthritis. He is on IV vancomycin and Zosyn. He has approximately 2 weeks of antibiotics left. We will reach out to his PCP and infectious disease regarding plans for antibiotic discontinuation and possible transition to oral  agents. He does not have any signs of recurrence on exam. His ankle motion is nonpainful and his incision is healing well. On his most recent labs, his white blood cell count was normal and his CRP was less than 1. We will start formal physical therapy to try to maximize his range of motion and work on edema control. We discussed concerning signs and symptoms for which she will monitor. I will follow-up with him in 2 weeks.            Electronically Signed by: Lg Charles MD -Author on January 16, 2021 12:12:29 PM

## 2021-05-14 NOTE — PROGRESS NOTES
Progress Note      Patient Name: Tushar Castillo   Patient ID: [id number]   Sex: Male   YOB: 1970    Primary Care Provider: Destini HARKINS   Referring Provider: Lashell KIMBLE    Visit Date: March 17, 2021    Provider: Lg Charles MD   Location: [facility]   Location Address: [address] [place] [address]  [id number]   Location Phone: [phone]          Chief Complaint  · RIGHT ANKLE PAIN      History Of Present Illness  Tushar Castillo is a 50 year old /Black male who presents today to [facility].      Tushar returns today for a follow-up of his right ankle. To review, he had right ankle septic arthritis. This was treated with 2 irrigation and debridement's, the last of which was on 12/13/2020. He completed 6 weeks of IV vancomycin and Zosyn. He did not have any positive cultures throughout his hospital stay. He has continued to attend physical therapy nothing improvement with his motion and function. He has attended 6 physical therapy sessions.  He states that the swelling is much improved, he explains that it is related to the amount of walking he does.  He attempted the ankle brace but it worsened the swelling so he discontinued it. He presents today with k-taping. He denies any pain at rest. He is ambulating without any assistive device and he feels like his gait continues to improve. He denies any fevers or chills. He denies any issues with his incision. He is working back on site now.           Past Medical History  Arthritis; Hyperlipemia; Hypertension; Limb Swelling; Medial meniscus tear, Left; Primary osteoarthritis of left knee; Seasonal allergies; Sleep apnea         Past Surgical History  Colonoscopy; Eye Implant; Lasik         Medication List  amlodipine-benazepril 5-40 mg oral capsule; fexofenadine 180 mg oral tablet; hydrochlorothiazide 25 mg oral tablet; mirtazapine 30 mg oral tablet; Multaq 400 mg oral tablet; Pradaxa 150 mg oral  capsule; rosuvastatin 40 mg oral tablet; sildenafil 50 mg oral tablet; Toprol  mg oral tablet extended release 24 hr; Vitamin D2 50,000 unit oral capsule         Allergy List  Compazine       Allergies Reconciled  Family Medical History  Cancer, Unspecified; Diabetes, unspecified type; *No Known Family History; Family history of certain chronic disabling diseases; arthritis         Social History  Alcohol (Light); Alcohol Use (Current some day); .; lives alone; Recreational Drug Use (Never); Tobacco (Never); Working         Review of Systems  · Constitutional  o Denies  o : fever, chills, weight loss  · Cardiovascular  o Denies  o : chest pain, shortness of breath  · Gastrointestinal  o Denies  o : liver disease, heartburn, nausea, blood in stools  · Genitourinary  o Denies  o : painful urination, blood in urine  · Integument  o Denies  o : rash, itching  · Neurologic  o Denies  o : headache, weakness, loss of consciousness  · Musculoskeletal  o Admits  o : right ankle pain  · Psychiatric  o Denies  o : drug/alcohol addiction, anxiety, depression      Vitals  Date Time BP Position Site L\R Cuff Size HR RR TEMP (F) WT  HT  BMI kg/m2 BSA m2 O2 Sat FR L/min FiO2 HC       03/17/2021 10:09 AM      99 - R   273lbs 0oz 6'   37.03 2.51 98 %            Physical Examination  · Constitutional  o Appearance  o : well developed, well-nourished, no obvious deformities present  · Head and Face  o Head  o :   § Inspection  § : normocephalic  o Face  o :   § Inspection  § : no facial lesions  · Eyes  o Conjunctivae  o : conjunctivae normal  o Sclerae  o : sclerae white  · Ears, Nose, Mouth and Throat  o Ears  o :   § External Ears  § : appearance within normal limits  § Hearing  § : intact  o Nose  o :   § External Nose  § : appearance normal  · Neck  o Inspection/Palpation  o : normal appearance  o Range of Motion  o : full range of motion  · Respiratory  o Respiratory Effort  o : breathing unlabored  o Inspection of  Chest  o : normal appearance  o Auscultation of Lungs  o : no audible wheezing or rales  · Cardiovascular  o Heart  o : regular rate  · Gastrointestinal  o Abdominal Examination  o : soft and non-tender  · Skin and Subcutaneous Tissue  o General Inspection  o : intact, no rashes  · Psychiatric  o General  o : Alert and oriented x3  o Judgement and Insight  o : judgment and insight intact  o Mood and Affect  o : mood normal, affect appropriate  · Right Ankle/Foot  o Inspection  o : K-taping in place today. Incision is well healed. No Erythema. No warmth. Lateral persistent swelling. No areas of tenderness. No palpable focal abscess or fluid collections. Demonstrates active ankle plantarflexion greater than 30 degrees and dorsiflexion 5 degrees beyond neutral without pain. Sensation intact to light touch over the dorsal and plantar foot. Wiggles toes pain-free. Calf soft and nontender. Palpable DP pulse.               Assessment  · Aftercare following surgery of the muskuloskeletal system     V54.81  · Right ankle pain     719.47/M25.571  · Septic arthritis     711.00/M00.9      Plan  · Medications  o Medications have been Reconciled  o Transition of Care or Provider Policy  · Instructions  o Reviewed the patient's Past Medical, Social, and Family history as well as the ROS at today's visit, no changes.  o Call or return if worsening symptoms.  o Follow Up in 4 weeks.   o The above service was scribed by Soraida Carrillo on my behalf and I attest to the accuracy of the note. cb  sean Tushra continues to make functional improvement after irrigation and debridement of his right ankle septic arthritis. His incision is healed. He is continuing to see progress with physical therapy. He is very happy with the work that they are doing. His biggest issue is the intermittent swelling that seems to be activity related. This is not unexpected given his underlying ankle arthritis as well as his two I and Ds that were performed. We  discussed compression wraps to help with swelling. We discussed ice and elevation. He will continue physical therapy over the next month and follow up with me after that time. No additional X-rays are needed at his follow-up visit. We discussed concerning signs and symptoms. He may call with any concerns prior to that time.   · Referrals  o ID: [id number] Date: 12/22/2020 Type: Inbound  Specialty: Orthopedic Surgery            Electronically Signed by: Lg Charles MD -Author on March 21, 2021 04:37:30 PM

## 2021-05-14 NOTE — PROGRESS NOTES
Progress Note      Patient Name: Tushar Castillo   Patient ID: [id number]   Sex: Male   YOB: 1970    Primary Care Provider: Destini HARKINS   Referring Provider: Lashell KIMBLE    Visit Date: April 26, 2021    Provider: Lg Charles MD   Location: [facility]   Location Address: [address] [place] [address]  [id number]   Location Phone: [phone]          Chief Complaint  · Followup of right ankle septic arthritis.      History Of Present Illness  Tushar Castillo is a 50 year old /Black male who returns for followup of his right ankle. To review, he had right ankle septic arthritis that was treated with two irrigation and debridements. The last surgery was on 12/13/2020. He completed six weeks of IV vancomycin and Zosyn. He has been progressing with therapy. He is very happy with his progress overall. He denies any pain at rest. He has pain with prolonged walking. His swelling continues to improve. He denies any issues with his incision. He denies any associated numbness or tingling. He feels like he is progressing with therapy and would like to continue for another month. He is back to work and able to perform all his duties without any difficulty.       Past Medical History  Arthritis; Hyperlipemia; Hypertension; Limb Swelling; Medial meniscus tear, Left; Primary osteoarthritis of left knee; Seasonal allergies; Sleep apnea         Past Surgical History  Colonoscopy; Eye Implant; Lasik         Medication List  amlodipine-benazepril 5-40 mg oral capsule; fexofenadine 180 mg oral tablet; hydrochlorothiazide 25 mg oral tablet; mirtazapine 30 mg oral tablet; Multaq 400 mg oral tablet; Pradaxa 150 mg oral capsule; rosuvastatin 40 mg oral tablet; sildenafil 50 mg oral tablet; Toprol  mg oral tablet extended release 24 hr; Vitamin D2 50,000 unit oral capsule         Allergy List  Compazine       Allergies Reconciled  Family Medical History  Cancer, Unspecified;  "Diabetes, unspecified type; *No Known Family History; Family history of certain chronic disabling diseases; arthritis         Social History  Alcohol (Light); Alcohol Use (Current some day); .; lives alone; Recreational Drug Use (Never); Tobacco (Never); Working         Review of Systems  · Constitutional  o Denies  o : fever, chills, weight loss  · Cardiovascular  o Denies  o : chest pain, shortness of breath  · Gastrointestinal  o Denies  o : liver disease, heartburn, nausea, blood in stools  · Genitourinary  o Denies  o : painful urination, blood in urine  · Integument  o Denies  o : rash, itching  · Neurologic  o Denies  o : headache, weakness, loss of consciousness  · Musculoskeletal  o Admits  o : ankle pain  · Psychiatric  o Denies  o : drug/alcohol addiction, anxiety, depression      Vitals  Date Time BP Position Site L\R Cuff Size HR RR TEMP (F) WT  HT  BMI kg/m2 BSA m2 O2 Sat FR L/min FiO2        04/26/2021 10:14 AM       13  273lbs 0oz 5'  11.5\" 37.54 2.5             Physical Examination  · Constitutional  o Appearance  o : well developed, well-nourished, no obvious deformities present  · Head and Face  o Head  o :   § Inspection  § : normocephalic  o Face  o :   § Inspection  § : no facial lesions  · Eyes  o Conjunctivae  o : conjunctivae normal  o Sclerae  o : sclerae white  · Ears, Nose, Mouth and Throat  o Ears  o :   § External Ears  § : appearance within normal limits  § Hearing  § : intact  o Nose  o :   § External Nose  § : appearance normal  · Neck  o Inspection/Palpation  o : normal appearance  o Range of Motion  o : full range of motion  · Respiratory  o Respiratory Effort  o : breathing unlabored  o Inspection of Chest  o : normal appearance  o Auscultation of Lungs  o : no audible wheezing or rales  · Cardiovascular  o Heart  o : regular rate  · Gastrointestinal  o Abdominal Examination  o : soft and non-tender  · Skin and Subcutaneous Tissue  o General Inspection  o : intact, no " rashes  · Psychiatric  o General  o : Alert and oriented x3  o Judgement and Insight  o : judgment and insight intact  o Mood and Affect  o : mood normal, affect appropriate  · Right Ankle/Foot  o Inspection  o : Nonantalgic gait. Right ankle well-healed anteromedial incision. No focal swelling. No erythema. No warmth. Ankle range of motion from 5 degrees of dorsiflexion to 25 degrees of plantar flexion without crepitus. No areas of tenderness about the ankle. No pain with passive subtalar range of motion. Sensation intact to light touch over the dorsal and plantar foot. Palpable dorsalis pedis pulse.           Assessment  · Right ankle pain, unspecified chronicity     719.47/M25.571  · Right ankle septic arthritis     711.00/M00.9  · Aftercare following irrigation and debridement of right ankle 12/13/2020     V58.77/Z48.817      Plan  · Instructions  o Reviewed the patient's Past Medical, Social, and Family history as well as the ROS at today's visit, no changes.  o Call or return if worsening symptoms.  o Note transcribed by Araceli Duggan. cb   o Tushar continues to make functional improvements with physical therapy with regards to his right ankle. His infection appears to have cleared, and he does not have any signs of recurrence at this point. We will continue physical therapy over the next month. He has no restrictions from my standpoint. I will see him back in one month for reevaluation. We will obtain new AP, oblique, and lateral ankle X-rays at that visit.             Electronically Signed by: Lg Charles MD -Author on April 30, 2021 10:23:34 AM

## 2021-05-14 NOTE — PROGRESS NOTES
Progress Note      Patient Name: Tushar Castillo   Patient ID: [id number]   Sex: Male   YOB: 1970    Primary Care Provider: Destini HARKINS   Referring Provider: Lashell KIMBLE    Visit Date: December 23, 2020    Provider: Lg Charles MD   Location: [facility]   Location Address: [address] [place] [address]  [id number]   Location Phone: [phone]          Chief Complaint  · right ankle pain      History Of Present Illness  Tushar Castillo is a 50 year old /Black male who presents today to [facility].      Tushar presents for follow-up of his right ankle.  He had right ankle septic arthritis that was treated with 2 irrigation and debridements.  The last of which was on 12/13/2020.  His cultures were negative from his aspiration or either surgery.  He is on IV vancomycin and Zosyn.  He is being monitored by the infectious disease team.  He was recently discharged from the hospital.  He reports that his pain and swelling continue to improve.  He is ambulating with a walker.  He denies any fevers or chills.  His incision is dry at this point with no drainage.  He denies any new erythema or new pain elsewhere.  He is tolerating his home antibiotic regimen well.  He is on Pradaxa for history of A. fib.             Past Medical History  Hyperlipemia; Hypertension; Limb Swelling; Medial meniscus tear, Left; Primary osteoarthritis of left knee; Seasonal allergies; Sleep apnea         Past Surgical History  Colonoscopy; Eye Implant; Lasik         Medication List  amlodipine-benazepril 5-40 mg oral capsule; fexofenadine 180 mg oral tablet; hydrochlorothiazide 25 mg oral tablet; mirtazapine 30 mg oral tablet; Multaq 400 mg oral tablet; Pradaxa 150 mg oral capsule; rosuvastatin 40 mg oral tablet; sildenafil 50 mg oral tablet; Toprol  mg oral tablet extended release 24 hr; Vitamin D2 50,000 unit oral capsule         Allergy List  Compazine       Allergies  "Reconciled  Family Medical History  *No Known Family History; Family history of certain chronic disabling diseases; arthritis         Social History  Alcohol (Light); Alcohol Use (Current some day); .; lives alone; Recreational Drug Use (Never); Tobacco (Never); Working         Review of Systems  · Constitutional  o Denies  o : fever, chills, weight loss  · Cardiovascular  o Denies  o : chest pain, shortness of breath  · Gastrointestinal  o Denies  o : liver disease, heartburn, nausea, blood in stools  · Genitourinary  o Denies  o : painful urination, blood in urine  · Integument  o Denies  o : rash, itching  · Neurologic  o Denies  o : headache, weakness, loss of consciousness  · Musculoskeletal  o Admits  o : Right ankle pain and swelling  · Psychiatric  o Denies  o : drug/alcohol addiction, anxiety, depression      Vitals  Date Time BP Position Site L\R Cuff Size HR RR TEMP (F) WT  HT  BMI kg/m2 BSA m2 O2 Sat FR L/min FiO2        12/23/2020 09:52 AM      81 - R   263lbs 0oz 5'  11\" 36.68 2.44 97 %            Physical Examination  · Constitutional  o Appearance  o : well developed, well-nourished, no obvious deformities present  · Head and Face  o Head  o :   § Inspection  § : normocephalic  o Face  o :   § Inspection  § : no facial lesions  · Eyes  o Conjunctivae  o : conjunctivae normal  o Sclerae  o : sclerae white  · Ears, Nose, Mouth and Throat  o Ears  o :   § External Ears  § : appearance within normal limits  § Hearing  § : intact  o Nose  o :   § External Nose  § : appearance normal  · Neck  o Inspection/Palpation  o : normal appearance  o Range of Motion  o : full range of motion  · Respiratory  o Respiratory Effort  o : breathing unlabored  o Inspection of Chest  o : normal appearance  o Auscultation of Lungs  o : no audible wheezing or rales  · Cardiovascular  o Heart  o : regular rate  · Gastrointestinal  o Abdominal Examination  o : soft and non-tender  · Skin and Subcutaneous " Tissue  o General Inspection  o : intact, no rashes  · Psychiatric  o General  o : Alert and oriented x3  o Judgement and Insight  o : judgment and insight intact  o Mood and Affect  o : mood normal, affect appropriate  · Right Ankle/Foot  o Inspection  o : Anterior medial incision about the right ankle is healing well. Light reactive type erythema about the incision. No active drainage. No purulence. Sutures remain in place as he is only 10 days postop at this point. Moderate persistent swelling about the ankle continues to steadily improve. Demonstrates active ankle plantarflexion and dorsiflexion without pain. Terminal stiffness noted with both. Antalgic gait favoring the right ankle with a walker but is able to fully weight-bear today in the office. Calf soft and nontender. Sensation intact light touch over the dorsal and plantar foot. Wiggles toes pain-free. Palpable dorsalis pedis pulse and well-perfused foot.          Assessment  · Aftercare following surgery of the muskuloskeletal system     V54.81  · Right ankle pain     719.47/M25.571  · Septic arthritis     711.00/M00.9      Plan  · Medications  o Medications have been Reconciled  o Transition of Care or Provider Policy  · Instructions  o Reviewed the patient's Past Medical, Social, and Family history as well as the ROS at today's visit, no changes.  o Call or return if worsening symptoms.  o Exercise handout given.  o Follow up in 1 week.  o Tushar is now 10 days out from repeat I&D of the right ankle for septic arthritis. He continues to improve postoperatively. He is on vancomycin and Zosyn for a culture-negative infection which is being followed by the infectious disease team. He continues to improve in terms of swelling, pain, and function. He is ambulating with a walker. We discussed continuing the antibiotics as instructed. He should monitor for any fevers, chills, or new drainage from his incision. I would like to see him back in 1 week for suture  removal. We discussed formal physical therapy, however he would like to start with home exercises. Those were provided to him today. He is to return to the ER or call my office with any concerning symptoms.  o Continue Pradaxa  o Electronically Identified Patient Education Materials Provided Electronically            Electronically Signed by: Lg Charles MD -Author on December 26, 2020 11:50:02 AM

## 2021-05-14 NOTE — PROGRESS NOTES
Progress Note      Patient Name: Tushar Castillo   Patient ID: [id number]   Sex: Male   YOB: 1970    Primary Care Provider: Destini HARKINS   Referring Provider: Lashell IKMBLE    Visit Date: February 17, 2021    Provider: Lg Charles MD   Location: [facility]   Location Address: [address] [place] [address]  [id number]   Location Phone: [phone]          Chief Complaint  · right ankle pain      History Of Present Illness  Tushar Castillo is a 50 year old /Black male who presents today to [facility].      Tushar returns today for a follow-up of his right ankle. To review, he had right ankle septic arthritis. This was treated with 2 irrigation and debridement's, the last of which was on 12/13/2020. He completed 6 weeks of IV vancomycin and Zosyn. He has not had any positive cultures throughout his hospital stay. He has been preforming physical therapy since out last visit 3 weeks ago. He reports significant improvement in his motion and function. He still has intermittent swelling that he treats with an ace wrap. He denies any pain at rest. He is ambulating without any assistive device and he feels like his gait continues to improve. He denies any fevers or chills. He denies any issues with his incision.            Past Medical History  Arthritis; Hyperlipemia; Hypertension; Limb Swelling; Medial meniscus tear, Left; Primary osteoarthritis of left knee; Seasonal allergies; Sleep apnea         Past Surgical History  Colonoscopy; Eye Implant; Lasik         Medication List  amlodipine-benazepril 5-40 mg oral capsule; fexofenadine 180 mg oral tablet; hydrochlorothiazide 25 mg oral tablet; mirtazapine 30 mg oral tablet; Multaq 400 mg oral tablet; Pradaxa 150 mg oral capsule; rosuvastatin 40 mg oral tablet; sildenafil 50 mg oral tablet; Toprol  mg oral tablet extended release 24 hr; Vitamin D2 50,000 unit oral capsule         Allergy List  Compazine        Allergies Reconciled  Family Medical History  Cancer, Unspecified; Diabetes, unspecified type; *No Known Family History; Family history of certain chronic disabling diseases; arthritis         Social History  Alcohol (Light); Alcohol Use (Current some day); .; lives alone; Recreational Drug Use (Never); Tobacco (Never); Working         Review of Systems  · Constitutional  o Denies  o : fever, chills, weight loss  · Cardiovascular  o Denies  o : chest pain, shortness of breath  · Gastrointestinal  o Denies  o : liver disease, heartburn, nausea, blood in stools  · Genitourinary  o Denies  o : painful urination, blood in urine  · Integument  o Denies  o : rash, itching  · Neurologic  o Denies  o : headache, weakness, loss of consciousness  · Musculoskeletal  o Admits  o : right ankle pain   · Psychiatric  o Denies  o : drug/alcohol addiction, anxiety, depression      Vitals  Date Time BP Position Site L\R Cuff Size HR RR TEMP (F) WT  HT  BMI kg/m2 BSA m2 O2 Sat FR L/min FiO2 HC       02/17/2021 09:55 AM      108 - R   271lbs 4oz 6'   36.79 2.5 98 %            Physical Examination  · Constitutional  o Appearance  o : well developed, well-nourished, no obvious deformities present  · Head and Face  o Head  o :   § Inspection  § : normocephalic  o Face  o :   § Inspection  § : no facial lesions  · Eyes  o Conjunctivae  o : conjunctivae normal  o Sclerae  o : sclerae white  · Ears, Nose, Mouth and Throat  o Ears  o :   § External Ears  § : appearance within normal limits  § Hearing  § : intact  o Nose  o :   § External Nose  § : appearance normal  · Neck  o Inspection/Palpation  o : normal appearance  o Range of Motion  o : full range of motion  · Respiratory  o Respiratory Effort  o : breathing unlabored  o Inspection of Chest  o : normal appearance  o Auscultation of Lungs  o : no audible wheezing or rales  · Cardiovascular  o Heart  o : regular rate  · Gastrointestinal  o Abdominal Examination  o : soft and  non-tender  · Skin and Subcutaneous Tissue  o General Inspection  o : intact, no rashes  · Psychiatric  o General  o : Alert and oriented x3  o Judgement and Insight  o : judgment and insight intact  o Mood and Affect  o : mood normal, affect appropriate  · Right Ankle/Foot  o Inspection  o : Incision is well healed. No scabbing. No surrounding Erythema. Mild-Moderate diffuse swelling that continues to resolve. No areas of tenderness. No palpable focal abscess or fluid collections. Demonstrates active ankle plantarflexion and dorsiflexion without pain. Sensation intact to light touch over the dorsal and plantar foot. Wiggles toes pain-free. Calf soft and nontender. Palpable DP pulse.           Assessment  · Aftercare following surgery of the muskuloskeletal system     V54.81  · Right ankle pain     719.47/M25.571  · Septic arthritis     711.00/M00.9      Plan  · Medications  o Medications have been Reconciled  o Transition of Care or Provider Policy  · Instructions  o Reviewed the patient's Past Medical, Social, and Family history as well as the ROS at today's visit, no changes.  o Call or return if worsening symptoms.  o Follow Up in 4 weeks.   o This note was transcribed by Soraida Carrillo. cb  o Tushar continues to improve functionally after irrigation and debridement of his septic arthritis. He has seen benefits with physical therapy. We will continue physical therapy. He will continue symptomatic measures for his swelling. He is not having much pain at this point. We discussed concerning signs and symptoms. I will see him back in about 4 weeks for reevaluation, sooner if needed.   · Referrals  o ID: [id number] Date: 12/22/2020 Type: Inbound  Specialty: Orthopedic Surgery            Electronically Signed by: Soraida Carrillo MA -Author on February 19, 2021 08:12:43 AM  Electronically Co-signed by: Lg Charles MD -Reviewer on February 21, 2021 04:36:55 PM

## 2021-05-14 NOTE — PROGRESS NOTES
Progress Note      Patient Name: Tushar Castillo   Patient ID: [id number]   Sex: Male   YOB: 1970    Primary Care Provider: Destini HARKINS   Referring Provider: Lashell KIMBLE    Visit Date: December 30, 2020    Provider: Lg Charles MD   Location: [facility]   Location Address: [address] [place] [address]  [id number]   Location Phone: [phone]          Chief Complaint  · right ankle pain      History Of Present Illness  Tushar Castillo is a 50 year old /Black male who presents today to [facility].      Tushar returns today for follow-up of his right ankle.  He had a right ankle septic arthritis that was treated with 2 irrigation and debridements.  The last of which was on 12/13/2020.  His cultures were negative.  He is being managed with 6 weeks of IV vancomycin and Zosyn.  He is being monitored by the infectious disease team.  He reports that he had labs done last Thursday but has not received any results.  He denies any fevers or chills.  He is no longer needing a walker for ambulation.  He is performing ankle range of motion exercises as I previously instructed him.  He denies any drainage from his wound.  He denies any new joint pain or swelling.         Past Medical History  Arthritis; Hyperlipemia; Hypertension; Limb Swelling; Medial meniscus tear, Left; Primary osteoarthritis of left knee; Seasonal allergies; Sleep apnea         Past Surgical History  Colonoscopy; Eye Implant; Lasik         Medication List  amlodipine-benazepril 5-40 mg oral capsule; fexofenadine 180 mg oral tablet; hydrochlorothiazide 25 mg oral tablet; mirtazapine 30 mg oral tablet; Multaq 400 mg oral tablet; Pradaxa 150 mg oral capsule; rosuvastatin 40 mg oral tablet; sildenafil 50 mg oral tablet; Toprol  mg oral tablet extended release 24 hr; Vitamin D2 50,000 unit oral capsule         Allergy List  Compazine       Allergies Reconciled  Family Medical  "History  Cancer, Unspecified; Diabetes, unspecified type; *No Known Family History; Family history of certain chronic disabling diseases; arthritis         Social History  Alcohol (Light); Alcohol Use (Current some day); .; lives alone; Recreational Drug Use (Never); Tobacco (Never); Working         Review of Systems  · Constitutional  o Denies  o : fever, chills, weight loss  · Cardiovascular  o Denies  o : chest pain, shortness of breath  · Gastrointestinal  o Denies  o : liver disease, heartburn, nausea, blood in stools  · Genitourinary  o Denies  o : painful urination, blood in urine  · Integument  o Denies  o : rash, itching  · Neurologic  o Denies  o : headache, weakness, loss of consciousness  · Musculoskeletal  o Admits  o : Right ankle pain  · Psychiatric  o Denies  o : drug/alcohol addiction, anxiety, depression      Vitals  Date Time BP Position Site L\R Cuff Size HR RR TEMP (F) WT  HT  BMI kg/m2 BSA m2 O2 Sat FR L/min FiO2        12/30/2020 10:12 AM      116 - R   262lbs 8oz 5'  11.5\" 36.1 2.45             Physical Examination  · Constitutional  o Appearance  o : well developed, well-nourished, no obvious deformities present  · Head and Face  o Head  o :   § Inspection  § : normocephalic  o Face  o :   § Inspection  § : no facial lesions  · Eyes  o Conjunctivae  o : conjunctivae normal  o Sclerae  o : sclerae white  · Ears, Nose, Mouth and Throat  o Ears  o :   § External Ears  § : appearance within normal limits  § Hearing  § : intact  o Nose  o :   § External Nose  § : appearance normal  · Neck  o Inspection/Palpation  o : normal appearance  o Range of Motion  o : full range of motion  · Respiratory  o Respiratory Effort  o : breathing unlabored  o Inspection of Chest  o : normal appearance  o Auscultation of Lungs  o : no audible wheezing or rales  · Cardiovascular  o Heart  o : regular rate  · Gastrointestinal  o Abdominal Examination  o : soft and non-tender  · Skin and Subcutaneous " Tissue  o General Inspection  o : intact, no rashes  · Psychiatric  o General  o : Alert and oriented x3  o Judgement and Insight  o : judgment and insight intact  o Mood and Affect  o : mood normal, affect appropriate  · Right Ankle/Foot  o Inspection  o : Antalgic gait, but no assistive device today. Sutures removed in the office today without complication. The incision is clean, dry, intact. No surrounding erythema. No wound drainage. Moderate swelling into the foot persists, but continues to steadily improve. No palpable fluid collections. Nontender over the tibiotalar joint. Demonstrates active ankle plantarflexion and dorsiflexion with soreness but no severe pain. No pain with passive ankle plantarflexion and dorsiflexion. Sensation intact light touch over the dorsal and plantar foot. Wiggles toes pain-free. Calf soft and nontender. Palpable DP pulse.          Assessment  · Aftercare following surgery of the muskuloskeletal system     V54.81  · Right ankle pain     719.47/M25.571  · Septic arthritis     711.00/M00.9      Plan  · Medications  o Medications have been Reconciled  o Transition of Care or Provider Policy  · Instructions  o Sutures removed in clinic today.  o Reviewed the patient's Past Medical, Social, and Family history as well as the ROS at today's visit, no changes.  o Call or return if worsening symptoms.  o Follow Up in 2 weeks.  sean Finley is now approximately 2 weeks out from irrigation and debridement of his right ankle for septic arthritis. This was culture negative. He continues on IV vancomycin and Zosyn. He has home health services. He is being monitored by the infectious disease team with routine lab draws. We do not have his most recent lab results available but will try to follow-up. His sutures were removed today without complication. His ankle continues to improve clinically. We discussed daily wound care with mild soap and water cleanses to the incision. He will then apply a dry  dressing and Ace wrap to help with his swelling. He will continue his home exercises that we previously provided. He should ice and elevate. I will see him back in 2 weeks to monitor his progress. We may start formal physical therapy if needed. He continues on Pradaxa.            Electronically Signed by: Lg Charles MD -Author on December 31, 2020 01:37:50 PM

## 2021-05-15 VITALS
HEART RATE: 80 BPM | DIASTOLIC BLOOD PRESSURE: 80 MMHG | SYSTOLIC BLOOD PRESSURE: 136 MMHG | HEIGHT: 71 IN | BODY MASS INDEX: 35.98 KG/M2 | WEIGHT: 257 LBS

## 2021-05-15 VITALS
HEIGHT: 71 IN | DIASTOLIC BLOOD PRESSURE: 100 MMHG | HEART RATE: 96 BPM | WEIGHT: 264 LBS | SYSTOLIC BLOOD PRESSURE: 152 MMHG | BODY MASS INDEX: 36.96 KG/M2

## 2021-05-15 VITALS — WEIGHT: 257.37 LBS | HEIGHT: 71 IN | BODY MASS INDEX: 36.03 KG/M2 | RESPIRATION RATE: 12 BRPM

## 2021-05-15 VITALS — HEIGHT: 71 IN | BODY MASS INDEX: 35.84 KG/M2 | RESPIRATION RATE: 16 BRPM | WEIGHT: 256 LBS

## 2021-05-16 VITALS — BODY MASS INDEX: 37.45 KG/M2 | HEIGHT: 71 IN | OXYGEN SATURATION: 98 % | HEART RATE: 84 BPM | WEIGHT: 267.5 LBS

## 2021-05-16 VITALS
SYSTOLIC BLOOD PRESSURE: 154 MMHG | WEIGHT: 266 LBS | DIASTOLIC BLOOD PRESSURE: 100 MMHG | HEIGHT: 71 IN | BODY MASS INDEX: 37.24 KG/M2 | HEART RATE: 90 BPM

## 2021-05-16 VITALS — HEIGHT: 71 IN | WEIGHT: 260 LBS | BODY MASS INDEX: 36.4 KG/M2

## 2021-05-26 ENCOUNTER — OFFICE VISIT CONVERTED (OUTPATIENT)
Dept: ORTHOPEDIC SURGERY | Facility: CLINIC | Age: 51
End: 2021-05-26
Attending: STUDENT IN AN ORGANIZED HEALTH CARE EDUCATION/TRAINING PROGRAM

## 2021-06-05 NOTE — PROGRESS NOTES
Progress Note      Patient Name: Tushar Castillo   Patient ID: [id number]   Sex: Male   YOB: 1970    Primary Care Provider: Destini HARKINS   Referring Provider: Lashell KIMBLE    Visit Date: May 26, 2021    Provider: Lg Charles MD   Location: [facility]   Location Address: [address] [place] [address]  [id number]   Location Phone: [phone]          Chief Complaint  · FOLLOW UP  · RIGHT ANKLE PAIN      History Of Present Illness  Tushar Castillo is a 50 year old /Black male who presents today to [facility].      The patient presents today for the right ankle. He had right ankle septic arthritis that was treated with two irrigation and debridements. The last surgery was on 12/13/2020. He completed six weeks of IV vancomycin and Zosyn. He has been progressing with therapy. He is very happy with his progress overall. He denies any pain at rest. Patient reports he has been off of therapy lately and is doing well. He reports the ankle is improving and that he only has pain with stairs.                      Past Medical History  Arthritis; Hyperlipemia; Hypertension; Limb Swelling; Medial meniscus tear, Left; Primary osteoarthritis of left knee; Seasonal allergies; Sleep apnea         Past Surgical History  Colonoscopy; Eye Implant; Lasik         Medication List  amlodipine-benazepril 5-40 mg oral capsule; fexofenadine 180 mg oral tablet; hydrochlorothiazide 25 mg oral tablet; mirtazapine 30 mg oral tablet; Multaq 400 mg oral tablet; Pradaxa 150 mg oral capsule; rosuvastatin 40 mg oral tablet; sildenafil 50 mg oral tablet; Toprol  mg oral tablet extended release 24 hr; Vitamin D2 50,000 unit oral capsule         Allergy List  Compazine       Allergies Reconciled  Family Medical History  Cancer, Unspecified; Diabetes, unspecified type; - No Family History of Colorectal Cancer; *No Known Family History; Family history of certain chronic disabling  "diseases; arthritis         Social History  Alcohol (Light); Alcohol Use (Current some day); .; lives alone; Recreational Drug Use (Never); Tobacco (Never); Working         Review of Systems  · Constitutional  o Denies  o : fever, chills, weight loss  · Cardiovascular  o Denies  o : chest pain, shortness of breath  · Gastrointestinal  o Denies  o : liver disease, heartburn, nausea, blood in stools  · Genitourinary  o Denies  o : painful urination, blood in urine  · Integument  o Denies  o : rash, itching  · Neurologic  o Denies  o : headache, weakness, loss of consciousness  · Musculoskeletal  o Admits  o : ankle pain  · Psychiatric  o Denies  o : drug/alcohol addiction, anxiety, depression      Vitals  Date Time BP Position Site L\R Cuff Size HR RR TEMP (F) WT  HT  BMI kg/m2 BSA m2 O2 Sat FR L/min FiO2        05/26/2021 09:43 AM         268lbs 2oz 5'  11\" 37.4 2.47             Physical Examination  · Constitutional  o Appearance  o : well developed, well-nourished, no obvious deformities present  · Head and Face  o Head  o :   § Inspection  § : normocephalic  o Face  o :   § Inspection  § : no facial lesions  · Eyes  o Conjunctivae  o : conjunctivae normal  o Sclerae  o : sclerae white  · Ears, Nose, Mouth and Throat  o Ears  o :   § External Ears  § : appearance within normal limits  § Hearing  § : intact  o Nose  o :   § External Nose  § : appearance normal  · Neck  o Inspection/Palpation  o : normal appearance  o Range of Motion  o : full range of motion  · Respiratory  o Respiratory Effort  o : breathing unlabored  o Inspection of Chest  o : normal appearance  o Auscultation of Lungs  o : no audible wheezing or rales  · Cardiovascular  o Heart  o : regular rate  · Gastrointestinal  o Abdominal Examination  o : soft and non-tender  · Skin and Subcutaneous Tissue  o General Inspection  o : intact, no rashes  · Psychiatric  o General  o : Alert and oriented x3  o Judgement and Insight  o : judgment and " insight intact  o Mood and Affect  o : mood normal, affect appropriate  · Right Ankle/Foot  o Inspection  o : healed anteromedial incision, full ankle ROM, no pain with ROM or weight bearing, ROM 5 degrees of DF to 30 degrees of PF, ankle is stable, no signs of infection, Sensation intact to light touch over the dorsal and plantar foot, nontender to palpation  · In Office Procedures  o View  o : AP/LATERAL/MORTISE   o Site  o : RIGHT ANKLE   o Indication  o : RIGHT ANKLE PAIN   o Study  o : X-rays ordered, taken in the office, and reviewed today.  o Xray  o : reveals no acute fracture, subluxation or dislocation, well-aligned ankle mortise. Stable arthritic changes.   o Comparative Data  o : Comparative Data found and reviewed today          Assessment  · Right ankle pain, unspecified chronicity     719.47/M25.571  · Septic arthritis of right ankle     711.00/M00.9      Plan  · Orders  o Ankle (Right) [facility] Preferred View ([id number]) - 719.47/M25.571 - 05/26/2021  · Medications  o Medications have been Reconciled  o Transition of Care or Provider Policy  · Instructions  o Reviewed the patient's Past Medical, Social, and Family history as well as the ROS at today's visit, no changes.  o Call or return if worsening symptoms.  o X-ray ordered, taken and reviewed at this visit.  o The above service was scribed by Mary Anne Herron on my behalf and I attest to the accuracy of the note. cb  o Patient is doing well overall. He is happy with his progress, reports no pain or recurrent signs of infection. His ankle appears well on x-ray today. He has stopped his physical therapy due to cost, we recommended he continue with working on ROM of the ankle. He expressed understanding of the plan and will follow-up with us as needed.   o Electronically Identified Patient Education Materials Provided Electronically  · Referrals  o ID: [id number] Date: 12/22/2020 Type: Inbound  Specialty: Orthopedic Surgery            Electronically Signed by:  Lg Charles MD -Author on May 31, 2021 03:34:50 PM

## 2021-07-15 VITALS — WEIGHT: 268.12 LBS | BODY MASS INDEX: 37.54 KG/M2 | HEIGHT: 71 IN

## 2021-08-20 PROBLEM — E78.5 HYPERLIPEMIA: Status: ACTIVE | Noted: 2021-08-20

## 2021-08-20 PROBLEM — G47.30 SLEEP APNEA: Status: ACTIVE | Noted: 2021-08-20

## 2021-08-20 PROBLEM — I10 HYPERTENSION: Status: ACTIVE | Noted: 2021-08-20

## 2021-08-20 PROBLEM — I48.0 PAROXYSMAL ATRIAL FIBRILLATION (HCC): Status: ACTIVE | Noted: 2021-08-20

## 2021-11-10 ENCOUNTER — OFFICE VISIT (OUTPATIENT)
Dept: CARDIOLOGY | Facility: CLINIC | Age: 51
End: 2021-11-10

## 2021-11-10 VITALS
SYSTOLIC BLOOD PRESSURE: 136 MMHG | DIASTOLIC BLOOD PRESSURE: 86 MMHG | HEART RATE: 88 BPM | HEIGHT: 71 IN | BODY MASS INDEX: 37.8 KG/M2 | WEIGHT: 270 LBS

## 2021-11-10 DIAGNOSIS — I10 ESSENTIAL HYPERTENSION: ICD-10-CM

## 2021-11-10 DIAGNOSIS — I48.0 PAROXYSMAL ATRIAL FIBRILLATION (HCC): Primary | ICD-10-CM

## 2021-11-10 DIAGNOSIS — E78.5 HYPERLIPIDEMIA, UNSPECIFIED HYPERLIPIDEMIA TYPE: ICD-10-CM

## 2021-11-10 DIAGNOSIS — G47.33 OBSTRUCTIVE SLEEP APNEA SYNDROME: ICD-10-CM

## 2021-11-10 PROCEDURE — 99214 OFFICE O/P EST MOD 30 MIN: CPT | Performed by: INTERNAL MEDICINE

## 2021-11-10 RX ORDER — HYDROCHLOROTHIAZIDE 25 MG/1
TABLET ORAL
COMMUNITY

## 2021-11-10 RX ORDER — METOPROLOL SUCCINATE 100 MG/1
100 TABLET, EXTENDED RELEASE ORAL DAILY
COMMUNITY

## 2021-11-10 RX ORDER — SILDENAFIL 50 MG/1
TABLET, FILM COATED ORAL AS NEEDED
COMMUNITY
End: 2022-06-23 | Stop reason: SINTOL

## 2021-11-10 RX ORDER — AMLODIPINE BESYLATE AND BENAZEPRIL HYDROCHLORIDE 5; 40 MG/1; MG/1
CAPSULE ORAL
COMMUNITY

## 2021-11-10 RX ORDER — DABIGATRAN ETEXILATE 150 MG/1
CAPSULE ORAL
COMMUNITY

## 2021-11-10 RX ORDER — ROSUVASTATIN CALCIUM 40 MG/1
TABLET, COATED ORAL DAILY
COMMUNITY
End: 2021-11-10

## 2021-11-10 RX ORDER — ROSUVASTATIN CALCIUM 20 MG/1
20 TABLET, COATED ORAL DAILY
Qty: 90 TABLET | Refills: 3 | Status: SHIPPED | OUTPATIENT
Start: 2021-11-10 | End: 2022-06-23

## 2021-11-10 RX ORDER — MIRTAZAPINE 30 MG/1
30 TABLET, FILM COATED ORAL NIGHTLY
COMMUNITY

## 2021-11-10 RX ORDER — MULTIPLE VITAMINS W/ MINERALS TAB 9MG-400MCG
1 TAB ORAL DAILY
COMMUNITY

## 2021-11-10 RX ORDER — ERGOCALCIFEROL 1.25 MG/1
CAPSULE ORAL
COMMUNITY

## 2021-11-10 RX ORDER — FEXOFENADINE HCL 180 MG/1
TABLET ORAL DAILY
COMMUNITY

## 2021-11-10 NOTE — ASSESSMENT & PLAN NOTE
Patient on Crestor 40 and tolerating well LDL was at 80 however his LFTs have increased to just mildly above 100 recommended decreasing his Crestor 20 we'll repeat LFTs and lipids in 3 months if persistent elevation of LFTs above 100 recommended following up with his PCP to discuss further work-up also may consider other antiarrhythmic therapy as well as the Multaq may be contributing to the elevation

## 2021-11-10 NOTE — PROGRESS NOTES
Chief Complaint  Follow-up (Afib managment )    Subjective    Patient has been doing well symptomatically occasional heart palpitations and fluttering but no sustained dysrhythmias no increase shortness of breath    Past Medical History:   Diagnosis Date   • Acute medial meniscus tear of left knee 02/15/2018   • Arthritis    • Atrial fibrillation (HCC)    • Hyperlipemia    • Hypertension    • Limb swelling    • Primary osteoarthritis of left knee 02/14/2018   • Seasonal allergies    • Sleep apnea          Current Outpatient Medications:   •  amLODIPine-benazepril (LOTREL) 5-40 MG per capsule, amlodipine-benazepril 5-40 mg oral capsule take 1 capsule by oral route once daily   Active, Disp: , Rfl:   •  dabigatran etexilate (Pradaxa) 150 MG capsu, Pradaxa 150 mg oral capsule take 1 capsule (150 mg) by oral route 2 times per day   Active, Disp: , Rfl:   •  dronedarone (Multaq) 400 MG tablet, Multaq 400 mg oral tablet take 1 tablet (400 mg) by oral route 2 times per day with morning and evening meals   Active, Disp: , Rfl:   •  ergocalciferol (ERGOCALCIFEROL) 1.25 MG (13018 UT) capsule, , Disp: , Rfl:   •  fexofenadine (ALLEGRA) 180 MG tablet, Daily., Disp: , Rfl:   •  hydroCHLOROthiazide (HYDRODIURIL) 25 MG tablet, hydrochlorothiazide 25 mg oral tablet take 1 tablet (25 mg) by oral route once daily   Active, Disp: , Rfl:   •  metoprolol succinate XL (Toprol XL) 100 MG 24 hr tablet, Toprol  mg oral tablet extended release 24 hr take 1 tablet (100 mg) by oral route once daily   Active, Disp: , Rfl:   •  mirtazapine (Remeron) 30 MG tablet, Remeron 30 mg oral tablet take 1 tablet (30 mg) by oral route once daily before bedtime   Suspended, Disp: , Rfl:   •  multivitamin with minerals (MULTIVITAMIN ADULT PO), Take 1 tablet by mouth Daily., Disp: , Rfl:   •  sildenafil (VIAGRA) 50 MG tablet, As Needed., Disp: , Rfl:   •  rosuvastatin (CRESTOR) 20 MG tablet, Take 1 tablet by mouth Daily., Disp: 90 tablet, Rfl:  "3    Medications Discontinued During This Encounter   Medication Reason   • rosuvastatin (CRESTOR) 40 MG tablet      Allergies   Allergen Reactions   • Compazine [Prochlorperazine] Paresthesia     Jaws        Social History     Tobacco Use   • Smoking status: Never Smoker   • Smokeless tobacco: Never Used   Vaping Use   • Vaping Use: Never used   Substance Use Topics   • Alcohol use: Yes     Comment: LIGHT   • Drug use: Never       Family History   Problem Relation Age of Onset   • Diabetes Mother    • Arthritis Mother    • Cancer Father         Objective     /86   Pulse 88   Ht 180.3 cm (71\")   Wt 122 kg (270 lb)   BMI 37.66 kg/m²       Physical Exam    General Appearance:   · no acute distress  · Alert and oriented x3  HENT:   · lips not cyanotic  · Atraumatic  Neck:  · No jvd   · supple  Respiratory:  · no respiratory distress  · normal breath sounds  · no rales  Cardiovascular:  · Regular rate and rhythm  · no S3, no S4   · no murmur  · no rub  Extremities  · No cyanosis  · lower extremity edema: none    Skin:   · warm, dry  · No rashes      Result Review :     No results found for: PROBNP  CMP    CMP 12/30/20 1/5/21 1/15/21   Glucose 114 (A) 103 (A) 113 (A)   BUN 5 5 8   Creatinine 0.89 0.81 0.85   Sodium 139 138 137   Potassium 3.5 4.2 3.8   Chloride 106 103 106   Calcium 9.5 9.6 8.7   Albumin 3.7 3.7 3.7   Total Bilirubin 0.18 (A) 0.16 (A) 0.16 (A)   Alkaline Phosphatase 91 85 97   AST (SGOT) 33 57 (A) 107 (A)   ALT (SGPT) 33 47 (A) 93 (A)   (A) Abnormal value       Comments are available for some flowsheets but are not being displayed.           CBC w/diff    CBC w/Diff 1/15/21 3/31/21 5/11/21   WBC 6.51 8.68 9.27   RBC 3.82 (A) 4.69 4.83   Hemoglobin 11.5 (A) 14.2 14.4   Hematocrit 36.1 (A) 42.5 43.6   MCV 94.5 90.6 90.3   MCH 30.1 30.3 29.8   MCHC 31.9 (A) 33.4 33.0   RDW 19.5 (A) 15.4 14.0   Platelets 237 295 346   Neutrophil Rel % 31.3 46.9 48.8   Immature Granulocyte Rel %  0.1 0.4 "   Lymphocyte Rel % 51.0 (A) 37.6 35.8   Monocyte Rel % 10.4 (A) 11.4 8.5   Eosinophil Rel % 6.6 3.5 6.0   Basophil Rel % 0.5 0.5 0.5   (A) Abnormal value             No results found for: TSH   No results found for: FREET4   No results found for: DDIMERQUANT  Magnesium   Date Value Ref Range Status   12/18/2020 2.17 1.60 - 2.30 mg/dL Final      No results found for: DIGOXIN   Lab Results   Component Value Date    TROPONINT <0.01 12/24/2020             No results found for: POCTROP                   Diagnoses and all orders for this visit:    1. Paroxysmal atrial fibrillation (HCC) (Primary)  Assessment & Plan:  Patient symptomatically maintaining a normal sinus rhythm will repeat EKG on next visit encourage exercise and weight loss.  Continue with current dose of Multaq at 400 bid.  Will check thyroid function as well    Orders:  -     Thyroid Panel With TSH; Future    2. Hyperlipidemia, unspecified hyperlipidemia type  Assessment & Plan:  Patient on Crestor 40 and tolerating well LDL was at 80 however his LFTs have increased to just mildly above 100 recommended decreasing his Crestor 20 we'll repeat LFTs and lipids in 3 months if persistent elevation of LFTs above 100 recommended following up with his PCP to discuss further work-up also may consider other antiarrhythmic therapy as well as the Multaq may be contributing to the elevation    Orders:  -     Lipid Panel; Future  -     Hepatic Function Panel; Future    3. Essential hypertension    4. Obstructive sleep apnea syndrome    Other orders  -     rosuvastatin (CRESTOR) 20 MG tablet; Take 1 tablet by mouth Daily.  Dispense: 90 tablet; Refill: 3          Follow Up     Return in about 6 months (around 5/10/2022) for EKG with F/U.          Patient was given instructions and counseling regarding his condition or for health maintenance advice. Please see specific information pulled into the AVS if appropriate.

## 2021-11-10 NOTE — ASSESSMENT & PLAN NOTE
Patient symptomatically maintaining a normal sinus rhythm will repeat EKG on next visit encourage exercise and weight loss.  Continue with current dose of Multaq at 400 bid.  Will check thyroid function as well

## 2022-02-17 ENCOUNTER — PATIENT MESSAGE (OUTPATIENT)
Dept: CARDIOLOGY | Facility: CLINIC | Age: 52
End: 2022-02-17

## 2022-05-16 ENCOUNTER — TRANSCRIBE ORDERS (OUTPATIENT)
Dept: ADMINISTRATIVE | Facility: HOSPITAL | Age: 52
End: 2022-05-16

## 2022-05-16 DIAGNOSIS — R74.8 ELEVATED LIVER ENZYMES: Primary | ICD-10-CM

## 2022-06-16 ENCOUNTER — HOSPITAL ENCOUNTER (OUTPATIENT)
Dept: ULTRASOUND IMAGING | Facility: HOSPITAL | Age: 52
Discharge: HOME | End: 2022-06-16
Admitting: NURSE PRACTITIONER

## 2022-06-16 DIAGNOSIS — R74.8 ELEVATED LIVER ENZYMES: ICD-10-CM

## 2022-06-16 PROCEDURE — 76705 ECHO EXAM OF ABDOMEN: CPT

## 2022-06-23 ENCOUNTER — OFFICE VISIT (OUTPATIENT)
Dept: CARDIOLOGY | Facility: CLINIC | Age: 52
End: 2022-06-23

## 2022-06-23 VITALS
WEIGHT: 289 LBS | SYSTOLIC BLOOD PRESSURE: 146 MMHG | HEIGHT: 71 IN | BODY MASS INDEX: 40.46 KG/M2 | DIASTOLIC BLOOD PRESSURE: 82 MMHG | HEART RATE: 87 BPM

## 2022-06-23 DIAGNOSIS — I10 ESSENTIAL HYPERTENSION: ICD-10-CM

## 2022-06-23 DIAGNOSIS — E78.5 DYSLIPIDEMIA: ICD-10-CM

## 2022-06-23 DIAGNOSIS — I48.0 PAROXYSMAL ATRIAL FIBRILLATION: Primary | ICD-10-CM

## 2022-06-23 PROCEDURE — 99214 OFFICE O/P EST MOD 30 MIN: CPT | Performed by: INTERNAL MEDICINE

## 2022-06-23 RX ORDER — COLCHICINE 0.6 MG/1
TABLET ORAL
COMMUNITY
Start: 2022-05-12

## 2022-06-23 RX ORDER — ROSUVASTATIN CALCIUM 10 MG/1
10 TABLET, COATED ORAL NIGHTLY
Qty: 90 TABLET | Refills: 3 | Status: SHIPPED | OUTPATIENT
Start: 2022-06-23

## 2022-06-23 RX ORDER — HYDRALAZINE HYDROCHLORIDE 25 MG/1
100 TABLET, FILM COATED ORAL
COMMUNITY
Start: 2022-05-12

## 2022-06-23 RX ORDER — FLECAINIDE ACETATE 50 MG/1
50 TABLET ORAL 2 TIMES DAILY
Qty: 180 TABLET | Refills: 3 | Status: SHIPPED | OUTPATIENT
Start: 2022-06-23

## 2022-06-23 NOTE — ASSESSMENT & PLAN NOTE
Patient's LDL is below goal but triglycerides are elevated discussed some dietary modification and addition of fish oil supplementation 1000 units 4 times a day.  In addition encouraged increasing exercise and aerobic activity.  We will decrease his Crestor to 10 due to his elevated lipids repeat lipids and LFTs in 3 months

## 2022-06-23 NOTE — PROGRESS NOTES
Chief Complaint  Atrial Fibrillation (6 months follow up) and Hypertension    Subjective    Patient symptomatically has done well no problems with recurrent tachycardia fluttering he has had some weight gain but reports stable dyspnea on exertion symptoms    Past Medical History:   Diagnosis Date   • Acute medial meniscus tear of left knee 02/15/2018   • Arthritis    • Atrial fibrillation (HCC)    • Hyperlipemia    • Hypertension    • Limb swelling    • Primary osteoarthritis of left knee 02/14/2018   • Seasonal allergies    • Sleep apnea          Current Outpatient Medications:   •  amLODIPine-benazepril (LOTREL) 5-40 MG per capsule, amlodipine-benazepril 5-40 mg oral capsule take 1 capsule by oral route once daily   Active, Disp: , Rfl:   •  colchicine 0.6 MG tablet, , Disp: , Rfl:   •  dabigatran etexilate (PRADAXA) 150 MG capsu, Pradaxa 150 mg oral capsule take 1 capsule (150 mg) by oral route 2 times per day   Active, Disp: , Rfl:   •  ergocalciferol (ERGOCALCIFEROL) 1.25 MG (20455 UT) capsule, , Disp: , Rfl:   •  fexofenadine (ALLEGRA) 180 MG tablet, Daily., Disp: , Rfl:   •  hydrALAZINE (APRESOLINE) 25 MG tablet, , Disp: , Rfl:   •  hydroCHLOROthiazide (HYDRODIURIL) 25 MG tablet, hydrochlorothiazide 25 mg oral tablet take 1 tablet (25 mg) by oral route once daily   Active, Disp: , Rfl:   •  metoprolol succinate XL (TOPROL-XL) 100 MG 24 hr tablet, Take 100 mg by mouth Daily., Disp: , Rfl:   •  mirtazapine (REMERON) 30 MG tablet, Take 30 mg by mouth Every Night., Disp: , Rfl:   •  multivitamin with minerals tablet tablet, Take 1 tablet by mouth Daily., Disp: , Rfl:   •  flecainide (TAMBOCOR) 50 MG tablet, Take 1 tablet by mouth 2 (Two) Times a Day., Disp: 180 tablet, Rfl: 3  •  rosuvastatin (CRESTOR) 10 MG tablet, Take 1 tablet by mouth Every Night., Disp: 90 tablet, Rfl: 3    Medications Discontinued During This Encounter   Medication Reason   • sildenafil (VIAGRA) 50 MG tablet Side effects   • rosuvastatin  "(CRESTOR) 20 MG tablet    • dronedarone (MULTAQ) 400 MG tablet      Allergies   Allergen Reactions   • Compazine [Prochlorperazine] Paresthesia     Jaws        Social History     Tobacco Use   • Smoking status: Never Smoker   • Smokeless tobacco: Never Used   Vaping Use   • Vaping Use: Never used   Substance Use Topics   • Alcohol use: Yes     Comment: LIGHT   • Drug use: Never       Family History   Problem Relation Age of Onset   • Diabetes Mother    • Arthritis Mother    • Cancer Father         Objective     /82 (BP Location: Right arm)   Pulse 87   Ht 180.3 cm (71\")   Wt 131 kg (289 lb)   BMI 40.31 kg/m²       Physical Exam    General Appearance:   · no acute distress  · Alert and oriented x3  HENT:   · lips not cyanotic  · Atraumatic  Neck:  · No jvd   · supple  Respiratory:  · no respiratory distress  · normal breath sounds  · no rales  Cardiovascular:  · Regular rate and rhythm  · no S3, no S4   · no murmur  · no rub  Extremities  · No cyanosis  · lower extremity edema: none    Skin:   · warm, dry  · No rashes      Result Review :     No results found for: PROBNP       No results found for: TSH   No results found for: FREET4   No results found for: DDIMERQUANT  Magnesium   Date Value Ref Range Status   12/18/2020 2.17 1.60 - 2.30 mg/dL Final      No results found for: DIGOXIN   Lab Results   Component Value Date    TROPONINT <0.01 12/24/2020             No results found for: POCTROP                    Diagnoses and all orders for this visit:    1. Paroxysmal atrial fibrillation (HCC) (Primary)  Assessment & Plan:  Patient maintain normal sinus rhythm but persistently elevated LFTs we will change his Multaq to flecainide 50 twice daily repeat EKG 2 days after starting in addition we will get outpatient stress test to exclude ischemic heart disease.  Patient is on Pradaxa 150 twice daily for CVA prevention    Orders:  -     ECG 12 Lead; Future  -     Hepatic Function Panel; Future  -     Lipid Panel; " Future  -     Stress Test With Myocardial Perfusion One Day; Future    2. Essential hypertension  Assessment & Plan:  Patient's blood pressure mildly elevated today in office we will continue with his hydrochlorothiazide 25, Toprol 100, and Lotrel 5/40 recommended keeping a home blood pressure log  Counseled patient on  • low-sodium diet of less than 2 g  • Aerobic activity 30 minutes a day 5 times a week  • Weight loss          3. Dyslipidemia  Assessment & Plan:  Patient's LDL is below goal but triglycerides are elevated discussed some dietary modification and addition of fish oil supplementation 1000 units 4 times a day.  In addition encouraged increasing exercise and aerobic activity.  We will decrease his Crestor to 10 due to his elevated lipids repeat lipids and LFTs in 3 months    Orders:  -     Hepatic Function Panel; Future  -     Lipid Panel; Future    Other orders  -     rosuvastatin (CRESTOR) 10 MG tablet; Take 1 tablet by mouth Every Night.  Dispense: 90 tablet; Refill: 3  -     flecainide (TAMBOCOR) 50 MG tablet; Take 1 tablet by mouth 2 (Two) Times a Day.  Dispense: 180 tablet; Refill: 3          Follow Up     Return in about 6 months (around 12/23/2022) for EKG with F/U.          Patient was given instructions and counseling regarding his condition or for health maintenance advice. Please see specific information pulled into the AVS if appropriate.

## 2022-06-23 NOTE — ASSESSMENT & PLAN NOTE
Patient maintain normal sinus rhythm but persistently elevated LFTs we will change his Multaq to flecainide 50 twice daily repeat EKG 2 days after starting in addition we will get outpatient stress test to exclude ischemic heart disease.  Patient is on Pradaxa 150 twice daily for CVA prevention

## 2022-06-23 NOTE — ASSESSMENT & PLAN NOTE
Patient's blood pressure mildly elevated today in office we will continue with his hydrochlorothiazide 25, Toprol 100, and Lotrel 5/40 recommended keeping a home blood pressure log  Counseled patient on  • low-sodium diet of less than 2 g  • Aerobic activity 30 minutes a day 5 times a week  • Weight loss

## 2022-06-29 ENCOUNTER — OFFICE VISIT (OUTPATIENT)
Dept: CARDIOLOGY | Facility: CLINIC | Age: 52
End: 2022-06-29

## 2022-06-29 DIAGNOSIS — I48.0 PAROXYSMAL ATRIAL FIBRILLATION: Primary | ICD-10-CM

## 2022-06-29 PROCEDURE — 93000 ELECTROCARDIOGRAM COMPLETE: CPT | Performed by: NURSE PRACTITIONER

## 2022-07-05 ENCOUNTER — HOSPITAL ENCOUNTER (OUTPATIENT)
Dept: NUCLEAR MEDICINE | Facility: HOSPITAL | Age: 52
Discharge: HOME | End: 2022-07-05

## 2022-07-05 DIAGNOSIS — I48.0 PAROXYSMAL ATRIAL FIBRILLATION: ICD-10-CM

## 2022-07-05 LAB
BH CV IMMEDIATE POST TECH DATA BLOOD PRESSURE: NORMAL MMHG
BH CV IMMEDIATE POST TECH DATA HEART RATE: 160 BPM
BH CV IMMEDIATE POST TECH DATA OXYGEN SATS: 97 %
BH CV REST NUCLEAR ISOTOPE DOSE: 9.1 MCI
BH CV SIX MINUTE RECOVERY TECH DATA BLOOD PRESSURE: NORMAL
BH CV SIX MINUTE RECOVERY TECH DATA HEART RATE: 107 BPM
BH CV SIX MINUTE RECOVERY TECH DATA OXYGEN SATURATION: 98 %
BH CV STRESS BP STAGE 1: NORMAL
BH CV STRESS BP STAGE 2: NORMAL
BH CV STRESS DURATION MIN STAGE 1: 3
BH CV STRESS DURATION MIN STAGE 2: 3
BH CV STRESS DURATION MIN STAGE 3: 3
BH CV STRESS DURATION SEC STAGE 1: 0
BH CV STRESS DURATION SEC STAGE 2: 0
BH CV STRESS DURATION SEC STAGE 3: 0
BH CV STRESS GRADE STAGE 1: 10
BH CV STRESS GRADE STAGE 2: 12
BH CV STRESS GRADE STAGE 3: 14
BH CV STRESS HR STAGE 1: 132
BH CV STRESS HR STAGE 2: 154
BH CV STRESS HR STAGE 3: 160
BH CV STRESS METS STAGE 1: 5
BH CV STRESS METS STAGE 2: 7.5
BH CV STRESS METS STAGE 3: 10
BH CV STRESS NUCLEAR ISOTOPE DOSE: 35.3 MCI
BH CV STRESS O2 STAGE 1: 98
BH CV STRESS O2 STAGE 2: 97
BH CV STRESS O2 STAGE 3: 96
BH CV STRESS PROTOCOL 1: NORMAL
BH CV STRESS SPEED STAGE 1: 1.7
BH CV STRESS SPEED STAGE 2: 2.5
BH CV STRESS SPEED STAGE 3: 3.4
BH CV STRESS STAGE 1: 1
BH CV STRESS STAGE 2: 2
BH CV STRESS STAGE 3: 3
BH CV THREE MINUTE POST TECH DATA BLOOD PRESSURE: NORMAL MMHG
BH CV THREE MINUTE POST TECH DATA HEART RATE: 110 BPM
BH CV THREE MINUTE POST TECH DATA OXYGEN SATURATION: 98 %
LV EF NUC BP: 57 %
MAXIMAL PREDICTED HEART RATE: 168 BPM
PERCENT MAX PREDICTED HR: 95.24 %
STRESS BASELINE BP: NORMAL MMHG
STRESS BASELINE HR: 86 BPM
STRESS O2 SAT REST: 98 %
STRESS PERCENT HR: 112 %
STRESS POST ESTIMATED WORKLOAD: 8.1 METS
STRESS POST EXERCISE DUR MIN: 6 MIN
STRESS POST EXERCISE DUR SEC: 39 SEC
STRESS POST O2 SAT PEAK: 98 %
STRESS POST PEAK BP: NORMAL MMHG
STRESS POST PEAK HR: 160 BPM
STRESS TARGET HR: 143 BPM

## 2022-07-05 PROCEDURE — A9502 TC99M TETROFOSMIN: HCPCS | Performed by: INTERNAL MEDICINE

## 2022-07-05 PROCEDURE — 93018 CV STRESS TEST I&R ONLY: CPT | Performed by: INTERNAL MEDICINE

## 2022-07-05 PROCEDURE — 78452 HT MUSCLE IMAGE SPECT MULT: CPT

## 2022-07-05 PROCEDURE — 0 TECHNETIUM TETROFOSMIN KIT: Performed by: INTERNAL MEDICINE

## 2022-07-05 PROCEDURE — 78452 HT MUSCLE IMAGE SPECT MULT: CPT | Performed by: INTERNAL MEDICINE

## 2022-07-05 PROCEDURE — 93017 CV STRESS TEST TRACING ONLY: CPT

## 2022-07-05 RX ADMIN — TETROFOSMIN 1 DOSE: 1.38 INJECTION, POWDER, LYOPHILIZED, FOR SOLUTION INTRAVENOUS at 11:44

## 2022-07-05 RX ADMIN — TETROFOSMIN 1 DOSE: 1.38 INJECTION, POWDER, LYOPHILIZED, FOR SOLUTION INTRAVENOUS at 09:45

## 2022-07-06 ENCOUNTER — TELEPHONE (OUTPATIENT)
Dept: CARDIOLOGY | Facility: CLINIC | Age: 52
End: 2022-07-06

## 2022-07-06 NOTE — PROGRESS NOTES
Procedure     ECG 12 Lead    Date/Time: 6/29/2022 3:31 PM  Performed by: Em Monroe APRN  Authorized by: Em Monroe APRN   Comparison: compared with previous ECG   Rhythm: sinus rhythm  Rate: normal  BPM: 83  Conduction: conduction normal  ST Segments: ST segments normal  T Waves: T waves normal  QRS axis: normal  Other: no other findings    Clinical impression: normal ECG

## 2022-07-06 NOTE — TELEPHONE ENCOUNTER
----- Message from TORIN Allison sent at 7/5/2022  8:57 PM EDT -----  Notify pt stress test result: Myocardial perfusion imaging indicates a normal myocardial perfusion study with no evidence of ischemia which means there is no sign of potential blockage in vessels of the heart. Okay to continue flecainide medication

## 2022-09-27 ENCOUNTER — TELEPHONE (OUTPATIENT)
Dept: ORTHOPEDIC SURGERY | Facility: CLINIC | Age: 52
End: 2022-09-27

## 2022-09-27 NOTE — TELEPHONE ENCOUNTER
Caller: Tushar Castillo    Relationship to patient: Self    Best call back number: [phone]    Chief complaint: RIGHT ANKLE - SWELLING - POST SURGERY 12.13.2020    Type of visit: FOLLOW UP    Requested date: ASAP     Additional notes: PATIENT IS REQUESTING FOLLOW UP WITH DR NAVA SINCE HE DID HIS ANKLE SURGERY IN 12.13.20-- PLEASE ADVISE ON SCHEDULING. PER REF TOOL PROVIDERS NO LONGER TREAT FOOT/ANKLE PAIN-    PLEASE ADVISE PATIENT. TY

## 2023-01-12 ENCOUNTER — TRANSCRIBE ORDERS (OUTPATIENT)
Dept: ADMINISTRATIVE | Facility: HOSPITAL | Age: 53
End: 2023-01-12
Payer: OTHER GOVERNMENT

## 2023-01-12 DIAGNOSIS — G89.29 CHRONIC PAIN OF RIGHT KNEE: Primary | ICD-10-CM

## 2023-01-12 DIAGNOSIS — M25.561 CHRONIC PAIN OF RIGHT KNEE: Primary | ICD-10-CM

## 2023-01-18 ENCOUNTER — OFFICE VISIT (OUTPATIENT)
Dept: CARDIOLOGY | Facility: CLINIC | Age: 53
End: 2023-01-18
Payer: OTHER GOVERNMENT

## 2023-01-18 VITALS
BODY MASS INDEX: 41.44 KG/M2 | HEART RATE: 87 BPM | HEIGHT: 71 IN | WEIGHT: 296 LBS | DIASTOLIC BLOOD PRESSURE: 86 MMHG | SYSTOLIC BLOOD PRESSURE: 144 MMHG

## 2023-01-18 DIAGNOSIS — I10 ESSENTIAL HYPERTENSION: ICD-10-CM

## 2023-01-18 DIAGNOSIS — I48.0 PAROXYSMAL ATRIAL FIBRILLATION: Primary | ICD-10-CM

## 2023-01-18 PROCEDURE — 93000 ELECTROCARDIOGRAM COMPLETE: CPT | Performed by: INTERNAL MEDICINE

## 2023-01-18 PROCEDURE — 99214 OFFICE O/P EST MOD 30 MIN: CPT | Performed by: INTERNAL MEDICINE

## 2023-01-18 NOTE — ASSESSMENT & PLAN NOTE
Patient maintain normal sinus rhythm EKG unchanged repeat on next visit due to use of flecainide 50 twice daily.  Patient is also on Pradaxa 150 twice daily for CVA prevention.

## 2023-01-18 NOTE — PROGRESS NOTES
Chief Complaint  Follow-up and Hypertension    Subjective    Patient is doing well has not been having any issues since last visit.  Occasional heart palpitations.  No chest pain problems  Past Medical History:   Diagnosis Date   • Acute medial meniscus tear of left knee 02/15/2018   • Arthritis    • Atrial fibrillation (HCC)    • Hyperlipemia    • Hypertension    • Limb swelling    • Primary osteoarthritis of left knee 02/14/2018   • Seasonal allergies    • Sleep apnea          Current Outpatient Medications:   •  amLODIPine-benazepril (LOTREL) 5-40 MG per capsule, amlodipine-benazepril 5-40 mg oral capsule take 1 capsule by oral route once daily   Active, Disp: , Rfl:   •  colchicine 0.6 MG tablet, , Disp: , Rfl:   •  dabigatran etexilate (PRADAXA) 150 MG capsu, Pradaxa 150 mg oral capsule take 1 capsule (150 mg) by oral route 2 times per day   Active, Disp: , Rfl:   •  ergocalciferol (ERGOCALCIFEROL) 1.25 MG (21872 UT) capsule, , Disp: , Rfl:   •  fexofenadine (ALLEGRA) 180 MG tablet, Daily., Disp: , Rfl:   •  flecainide (TAMBOCOR) 50 MG tablet, Take 1 tablet by mouth 2 (Two) Times a Day., Disp: 180 tablet, Rfl: 3  •  hydrALAZINE (APRESOLINE) 25 MG tablet, , Disp: , Rfl:   •  hydroCHLOROthiazide (HYDRODIURIL) 25 MG tablet, hydrochlorothiazide 25 mg oral tablet take 1 tablet (25 mg) by oral route once daily   Active, Disp: , Rfl:   •  metoprolol succinate XL (TOPROL-XL) 100 MG 24 hr tablet, Take 100 mg by mouth Daily., Disp: , Rfl:   •  mirtazapine (REMERON) 30 MG tablet, Take 30 mg by mouth Every Night., Disp: , Rfl:   •  multivitamin with minerals tablet tablet, Take 1 tablet by mouth Daily., Disp: , Rfl:   •  rosuvastatin (CRESTOR) 10 MG tablet, Take 1 tablet by mouth Every Night., Disp: 90 tablet, Rfl: 3    There are no discontinued medications.  Allergies   Allergen Reactions   • Compazine [Prochlorperazine] Paresthesia     Jaws        Social History     Tobacco Use   • Smoking status: Never   • Smokeless  "tobacco: Never   Vaping Use   • Vaping Use: Never used   Substance Use Topics   • Alcohol use: Yes     Comment: LIGHT   • Drug use: Never       Family History   Problem Relation Age of Onset   • Diabetes Mother    • Arthritis Mother    • Cancer Father         Objective     /86   Pulse 87   Ht 180.3 cm (71\")   Wt 134 kg (296 lb)   BMI 41.28 kg/m²       Physical Exam    General Appearance:   · no acute distress  · Alert and oriented x3  HENT:   · lips not cyanotic  · Atraumatic  Neck:  · No jvd   · supple  Respiratory:  · no respiratory distress  · normal breath sounds  · no rales  Cardiovascular:  · Regular rate and rhythm  · no S3, no S4   · no murmur  · no rub  Extremities  · No cyanosis  · lower extremity edema: none    Skin:   · warm, dry  · No rashes      Result Review :     No results found for: PROBNP       No results found for: TSH   No results found for: FREET4   No results found for: DDIMERQUANT  Magnesium   Date Value Ref Range Status   12/18/2020 2.17 1.60 - 2.30 mg/dL Final      No results found for: DIGOXIN   Lab Results   Component Value Date    TROPONINT <0.01 12/24/2020             No results found for: POCTROP         ECG 12 Lead    Date/Time: 1/18/2023 4:03 PM  Performed by: Matias Thayer MD  Authorized by: Matias Thayer MD   Comparison: compared with previous ECG   Similar to previous ECG  Rhythm: sinus rhythm                   Diagnoses and all orders for this visit:    1. Paroxysmal atrial fibrillation (HCC) (Primary)  Assessment & Plan:  Patient maintain normal sinus rhythm EKG unchanged repeat on next visit due to use of flecainide 50 twice daily.  Patient is also on Pradaxa 150 twice daily for CVA prevention.      2. Essential hypertension  Assessment & Plan:  Patient's blood pressure is mildly elevated systolically today encourage her to keep a home blood pressure log continue on hydrochlorothiazide 25, Toprol 100 daily, and Lotrel 5/40 daily  Counseled patient on  • low-sodium " diet of less than 2 g  • Aerobic activity 30 minutes a day 5 times a week  • Weight loss          Other orders  -     ECG 12 Lead          Follow Up     Return in about 6 months (around 7/18/2023) for Follow with Em [facility] EKG with F/U.          Patient was given instructions and counseling regarding his condition or for health maintenance advice. Please see specific information pulled into the AVS if appropriate.

## 2023-01-18 NOTE — ASSESSMENT & PLAN NOTE
Patient's blood pressure is mildly elevated systolically today encourage her to keep a home blood pressure log continue on hydrochlorothiazide 25, Toprol 100 daily, and Lotrel 5/40 daily  Counseled patient on  • low-sodium diet of less than 2 g  • Aerobic activity 30 minutes a day 5 times a week  • Weight loss

## 2023-02-03 ENCOUNTER — HOSPITAL ENCOUNTER (OUTPATIENT)
Dept: MRI IMAGING | Facility: HOSPITAL | Age: 53
Discharge: HOME | End: 2023-02-03
Admitting: NURSE PRACTITIONER
Payer: OTHER GOVERNMENT

## 2023-02-03 DIAGNOSIS — M25.561 CHRONIC PAIN OF RIGHT KNEE: ICD-10-CM

## 2023-02-03 DIAGNOSIS — G89.29 CHRONIC PAIN OF RIGHT KNEE: ICD-10-CM

## 2023-02-03 PROCEDURE — 73721 MRI JNT OF LWR EXTRE W/O DYE: CPT

## 2023-04-05 ENCOUNTER — HOSPITAL ENCOUNTER (EMERGENCY)
Facility: HOSPITAL | Age: 53
Discharge: HOME | End: 2023-04-05
Attending: EMERGENCY MEDICINE | Admitting: EMERGENCY MEDICINE
Payer: OTHER GOVERNMENT

## 2023-04-05 ENCOUNTER — APPOINTMENT (OUTPATIENT)
Dept: GENERAL RADIOLOGY | Facility: HOSPITAL | Age: 53
End: 2023-04-05
Payer: OTHER GOVERNMENT

## 2023-04-05 VITALS
HEIGHT: 72 IN | BODY MASS INDEX: 40.58 KG/M2 | SYSTOLIC BLOOD PRESSURE: 150 MMHG | WEIGHT: 299.61 LBS | DIASTOLIC BLOOD PRESSURE: 95 MMHG | OXYGEN SATURATION: 99 % | RESPIRATION RATE: 20 BRPM | HEART RATE: 83 BPM | TEMPERATURE: 98.5 F

## 2023-04-05 DIAGNOSIS — I47.1 PAROXYSMAL JUNCTIONAL TACHYCARDIA: Primary | ICD-10-CM

## 2023-04-05 DIAGNOSIS — R00.2 HEART PALPITATIONS: ICD-10-CM

## 2023-04-05 LAB
ALBUMIN SERPL-MCNC: 4.1 G/DL (ref 3.5–5.2)
ALBUMIN/GLOB SERPL: 1 G/DL
ALP SERPL-CCNC: 110 U/L (ref 39–117)
ALT SERPL W P-5'-P-CCNC: 68 U/L (ref 1–41)
ANION GAP SERPL CALCULATED.3IONS-SCNC: 12 MMOL/L (ref 5–15)
AST SERPL-CCNC: 57 U/L (ref 1–40)
BASOPHILS # BLD AUTO: 0.05 10*3/MM3 (ref 0–0.2)
BASOPHILS NFR BLD AUTO: 0.4 % (ref 0–1.5)
BILIRUB SERPL-MCNC: 0.2 MG/DL (ref 0–1.2)
BUN SERPL-MCNC: 13 MG/DL (ref 6–20)
BUN/CREAT SERPL: 12.7 (ref 7–25)
CALCIUM SPEC-SCNC: 9.1 MG/DL (ref 8.6–10.5)
CHLORIDE SERPL-SCNC: 102 MMOL/L (ref 98–107)
CO2 SERPL-SCNC: 26 MMOL/L (ref 22–29)
CREAT SERPL-MCNC: 1.02 MG/DL (ref 0.76–1.27)
DEPRECATED RDW RBC AUTO: 42.2 FL (ref 37–54)
EGFRCR SERPLBLD CKD-EPI 2021: 87.9 ML/MIN/1.73
EOSINOPHIL # BLD AUTO: 0.54 10*3/MM3 (ref 0–0.4)
EOSINOPHIL NFR BLD AUTO: 4.8 % (ref 0.3–6.2)
ERYTHROCYTE [DISTWIDTH] IN BLOOD BY AUTOMATED COUNT: 13.7 % (ref 12.3–15.4)
GLOBULIN UR ELPH-MCNC: 4 GM/DL
GLUCOSE SERPL-MCNC: 159 MG/DL (ref 65–99)
HCT VFR BLD AUTO: 44.3 % (ref 37.5–51)
HGB BLD-MCNC: 15.2 G/DL (ref 13–17.7)
HOLD SPECIMEN: NORMAL
HOLD SPECIMEN: NORMAL
IMM GRANULOCYTES # BLD AUTO: 0.03 10*3/MM3 (ref 0–0.05)
IMM GRANULOCYTES NFR BLD AUTO: 0.3 % (ref 0–0.5)
LYMPHOCYTES # BLD AUTO: 4.92 10*3/MM3 (ref 0.7–3.1)
LYMPHOCYTES NFR BLD AUTO: 43.8 % (ref 19.6–45.3)
MAGNESIUM SERPL-MCNC: 2 MG/DL (ref 1.6–2.6)
MCH RBC QN AUTO: 29 PG (ref 26.6–33)
MCHC RBC AUTO-ENTMCNC: 34.3 G/DL (ref 31.5–35.7)
MCV RBC AUTO: 84.4 FL (ref 79–97)
MONOCYTES # BLD AUTO: 0.93 10*3/MM3 (ref 0.1–0.9)
MONOCYTES NFR BLD AUTO: 8.3 % (ref 5–12)
NEUTROPHILS NFR BLD AUTO: 4.77 10*3/MM3 (ref 1.7–7)
NEUTROPHILS NFR BLD AUTO: 42.4 % (ref 42.7–76)
NRBC BLD AUTO-RTO: 0 /100 WBC (ref 0–0.2)
PLATELET # BLD AUTO: 311 10*3/MM3 (ref 140–450)
PMV BLD AUTO: 9.4 FL (ref 6–12)
POTASSIUM SERPL-SCNC: 3.7 MMOL/L (ref 3.5–5.2)
PROT SERPL-MCNC: 8.1 G/DL (ref 6–8.5)
QT INTERVAL: 318 MS
RBC # BLD AUTO: 5.25 10*6/MM3 (ref 4.14–5.8)
SODIUM SERPL-SCNC: 140 MMOL/L (ref 136–145)
TROPONIN T SERPL HS-MCNC: 13 NG/L
WBC NRBC COR # BLD: 11.24 10*3/MM3 (ref 3.4–10.8)
WHOLE BLOOD HOLD COAG: NORMAL
WHOLE BLOOD HOLD SPECIMEN: NORMAL

## 2023-04-05 PROCEDURE — 84484 ASSAY OF TROPONIN QUANT: CPT | Performed by: EMERGENCY MEDICINE

## 2023-04-05 PROCEDURE — 93010 ELECTROCARDIOGRAM REPORT: CPT | Performed by: INTERNAL MEDICINE

## 2023-04-05 PROCEDURE — 71045 X-RAY EXAM CHEST 1 VIEW: CPT

## 2023-04-05 PROCEDURE — 99284 EMERGENCY DEPT VISIT MOD MDM: CPT

## 2023-04-05 PROCEDURE — 93005 ELECTROCARDIOGRAM TRACING: CPT | Performed by: EMERGENCY MEDICINE

## 2023-04-05 PROCEDURE — 83735 ASSAY OF MAGNESIUM: CPT | Performed by: EMERGENCY MEDICINE

## 2023-04-05 PROCEDURE — 93005 ELECTROCARDIOGRAM TRACING: CPT

## 2023-04-05 PROCEDURE — 85025 COMPLETE CBC W/AUTO DIFF WBC: CPT | Performed by: EMERGENCY MEDICINE

## 2023-04-05 PROCEDURE — 80053 COMPREHEN METABOLIC PANEL: CPT | Performed by: EMERGENCY MEDICINE

## 2023-04-05 PROCEDURE — 96374 THER/PROPH/DIAG INJ IV PUSH: CPT

## 2023-04-05 RX ORDER — BENAZEPRIL HYDROCHLORIDE 20 MG/1
40 TABLET ORAL DAILY
COMMUNITY

## 2023-04-05 RX ORDER — SODIUM CHLORIDE 0.9 % (FLUSH) 0.9 %
10 SYRINGE (ML) INJECTION AS NEEDED
Status: DISCONTINUED | OUTPATIENT
Start: 2023-04-05 | End: 2023-04-05 | Stop reason: HOSPADM

## 2023-04-05 RX ADMIN — SODIUM CHLORIDE 500 ML: 9 INJECTION, SOLUTION INTRAVENOUS at 07:35

## 2023-04-05 RX ADMIN — METOPROLOL TARTRATE 5 MG: 1 INJECTION, SOLUTION INTRAVENOUS at 07:41

## 2023-04-05 NOTE — ED PROVIDER NOTES
Time: 6:46 AM EDT  Date of encounter:  4/5/2023  Independent Historian/Clinical History and Information was obtained by:   Patient  Chief Complaint: irregular heart beat    History is limited by: N/A          History of Present Illness:  Patient is a 52 y.o. year old male who presents to the emergency department for evaluation of irregular heart beat, heart racing, intermittent shortness of breath onset this morning upon waking. Patient states he felt his heart go out of rhythm. Patient states he missed a dose of Metoprolol last night, but he took it this morning. Cardiologist is Dr. Thayer. Patient states he was fine when he went to bed last night. Patient denies fevers, cough, vomiting, diarrhea, any new medication, anxiousness.       History provided by:  Patient   used: No        Patient Care Team  Primary Care Provider: Lashell Quinn APRN    Past Medical History:     Allergies   Allergen Reactions   • Compazine [Prochlorperazine] Paresthesia     Jaws     Past Medical History:   Diagnosis Date   • Acute medial meniscus tear of left knee 02/15/2018   • Arthritis    • Atrial fibrillation    • Hyperlipemia    • Hypertension    • Limb swelling    • Primary osteoarthritis of left knee 02/14/2018   • Seasonal allergies    • Sleep apnea      Past Surgical History:   Procedure Laterality Date   • COLONOSCOPY  2013   • INTRAOCULAR LENS INSERTION     • LASIK  2005     Family History   Problem Relation Age of Onset   • Diabetes Mother    • Arthritis Mother    • Cancer Father        Home Medications:  Prior to Admission medications    Medication Sig Start Date End Date Taking? Authorizing Provider   amLODIPine-benazepril (LOTREL) 5-40 MG per capsule amlodipine-benazepril 5-40 mg oral capsule take 1 capsule by oral route once daily   Active    Provider, MD Alex   colchicine 0.6 MG tablet  5/12/22   Provider, MD Alex   dabigatran etexilate (PRADAXA) 150 MG capsu Pradaxa 150 mg oral capsule  take 1 capsule (150 mg) by oral route 2 times per day   Active    Alex Dye MD   ergocalciferol (ERGOCALCIFEROL) 1.25 MG (10930 UT) capsule     Alex Dye MD   fexofenadine (ALLEGRA) 180 MG tablet Daily.    Alex Dye MD   flecainide (TAMBOCOR) 50 MG tablet Take 1 tablet by mouth 2 (Two) Times a Day. 6/23/22   Matias Thayer MD   hydrALAZINE (APRESOLINE) 25 MG tablet  5/12/22   Alex Dye MD   hydroCHLOROthiazide (HYDRODIURIL) 25 MG tablet hydrochlorothiazide 25 mg oral tablet take 1 tablet (25 mg) by oral route once daily   Active    Alex Dye MD   metoprolol succinate XL (TOPROL-XL) 100 MG 24 hr tablet Take 100 mg by mouth Daily.    Alex Dye MD   mirtazapine (REMERON) 30 MG tablet Take 30 mg by mouth Every Night.    Alex Dye MD   multivitamin with minerals tablet tablet Take 1 tablet by mouth Daily.    Alex Dye MD   rosuvastatin (CRESTOR) 10 MG tablet Take 1 tablet by mouth Every Night. 6/23/22   Matias Thayer MD        Social History:   Social History     Tobacco Use   • Smoking status: Never   • Smokeless tobacco: Never   Vaping Use   • Vaping Use: Never used   Substance Use Topics   • Alcohol use: Yes     Comment: LIGHT   • Drug use: Never         Review of Systems:  Review of Systems   Constitutional: Negative for chills and fever.   HENT: Negative for congestion, rhinorrhea and sore throat.    Eyes: Negative for photophobia.   Respiratory: Negative for apnea, cough, chest tightness and shortness of breath.    Cardiovascular: Positive for palpitations. Negative for chest pain.   Gastrointestinal: Negative for abdominal pain, diarrhea, nausea and vomiting.   Endocrine: Negative.    Genitourinary: Negative for difficulty urinating and dysuria.   Musculoskeletal: Negative for back pain, joint swelling and myalgias.   Skin: Negative for color change and wound.   Allergic/Immunologic: Negative.    Neurological: Negative  "for seizures and headaches.   Psychiatric/Behavioral: Negative.    All other systems reviewed and are negative.       Physical Exam:  /95   Pulse 83   Temp 98.5 °F (36.9 °C) (Oral)   Resp 20   Ht 182.9 cm (72\")   Wt 136 kg (299 lb 9.7 oz)   SpO2 99%   BMI 40.63 kg/m²     Physical Exam  Vitals and nursing note reviewed.   Constitutional:       General: He is not in acute distress.     Appearance: Normal appearance. He is not toxic-appearing.   HENT:      Head: Normocephalic and atraumatic.      Mouth/Throat:      Mouth: Mucous membranes are moist.   Eyes:      General: No scleral icterus.  Cardiovascular:      Rate and Rhythm: Regular rhythm. Tachycardia present.      Pulses: Normal pulses.           Radial pulses are 2+ on the right side and 2+ on the left side.      Heart sounds: Normal heart sounds. No murmur heard.    No friction rub.   Pulmonary:      Effort: Pulmonary effort is normal. No respiratory distress.      Breath sounds: Normal breath sounds. No decreased breath sounds, wheezing, rhonchi or rales.   Abdominal:      General: Abdomen is flat. Bowel sounds are normal. There is no distension.      Palpations: Abdomen is soft.      Tenderness: There is no abdominal tenderness. There is no guarding or rebound.   Musculoskeletal:         General: Normal range of motion.      Cervical back: Normal range of motion and neck supple.      Right lower leg: No edema.      Left lower leg: No edema.   Skin:     General: Skin is warm and dry.   Neurological:      Mental Status: He is alert and oriented to person, place, and time. Mental status is at baseline.                  Procedures:  Procedures      Medical Decision Making:      Comorbidities that affect care:    Atrial Fibrillation, Hypertension    External Notes reviewed:    Previous Clinic Note: From Cardiology Clinic 2 months ago for A-Fib      The following orders were placed and all results were independently analyzed by me:  Orders Placed This " Encounter   Procedures   • XR Chest 1 View   • [place] Draw   • Comprehensive Metabolic Panel   • Magnesium   • Single High Sensitivity Troponin T   • CBC Auto Differential   • Undress & Gown   • Continuous Pulse Oximetry   • ECG 12 Lead ED Triage Standing Order; Dysrhythmia   • ECG 12 Lead Rhythm Change   • CBC & Differential   • Green Top (Gel)   • Lavender Top   • Gold Top - SST   • Light Blue Top       Medications Given in the Emergency Department:  Medications   metoprolol tartrate (LOPRESSOR) injection 5 mg (5 mg Intravenous Given 4/5/23 0741)   sodium chloride 0.9 % bolus 500 mL (0 mL Intravenous Stopped 4/5/23 0847)        ED Course:    ED Course as of 04/05/23 1558   Wed Apr 05, 2023   0648 I reviewed and interpreted his twelve-lead EKG as junctional tachycardia with heart rate of 133 bpm, absent P waves, QRS showing IVCD, mild ST depressions noted diffusely, no ST elevation, normal T waves.  This EKG is changed from December 2020. [VS]      ED Course User Index  [VS] Harry Anders MD       Labs:    Lab Results (last 24 hours)     Procedure Component Value Units Date/Time    CBC & Differential [[id number]]  (Abnormal) Collected: 04/05/23 0638    Specimen: Blood Updated: 04/05/23 0650    Narrative:      The following orders were created for panel order CBC & Differential.  Procedure                               Abnormality         Status                     ---------                               -----------         ------                     CBC Auto Differential[[id number]]        Abnormal            Final result                 Please view results for these tests on the individual orders.    Comprehensive Metabolic Panel [[id number]]  (Abnormal) Collected: 04/05/23 0638    Specimen: Blood Updated: 04/05/23 0705     Glucose 159 mg/dL      BUN 13 mg/dL      Creatinine 1.02 mg/dL      Sodium 140 mmol/L      Potassium 3.7 mmol/L      Chloride 102 mmol/L      CO2 26.0 mmol/L      Calcium 9.1 mg/dL      Total  Protein 8.1 g/dL      Albumin 4.1 g/dL      ALT (SGPT) 68 U/L      AST (SGOT) 57 U/L      Alkaline Phosphatase 110 U/L      Total Bilirubin 0.2 mg/dL      Globulin 4.0 gm/dL      A/G Ratio 1.0 g/dL      BUN/Creatinine Ratio 12.7     Anion Gap 12.0 mmol/L      eGFR 87.9 mL/min/1.73     Narrative:      GFR Normal >60  Chronic Kidney Disease <60  Kidney Failure <15      Magnesium [[id number]]  (Normal) Collected: 04/05/23 0638    Specimen: Blood Updated: 04/05/23 0705     Magnesium 2.0 mg/dL     Single High Sensitivity Troponin T [[id number]]  (Normal) Collected: 04/05/23 0638    Specimen: Blood Updated: 04/05/23 0705     HS Troponin T 13 ng/L     Narrative:      High Sensitive Troponin T Reference Range:  <10.0 ng/L- Negative Female for AMI  <15.0 ng/L- Negative Male for AMI  >=10 - Abnormal Female indicating possible myocardial injury.  >=15 - Abnormal Male indicating possible myocardial injury.   Clinicians would have to utilize clinical acumen, EKG, Troponin, and serial changes to determine if it is an Acute Myocardial Infarction or myocardial injury due to an underlying chronic condition.         CBC Auto Differential [[id number]]  (Abnormal) Collected: 04/05/23 0638    Specimen: Blood Updated: 04/05/23 0650     WBC 11.24 10*3/mm3      RBC 5.25 10*6/mm3      Hemoglobin 15.2 g/dL      Hematocrit 44.3 %      MCV 84.4 fL      MCH 29.0 pg      MCHC 34.3 g/dL      RDW 13.7 %      RDW-SD 42.2 fl      MPV 9.4 fL      Platelets 311 10*3/mm3      Neutrophil % 42.4 %      Lymphocyte % 43.8 %      Monocyte % 8.3 %      Eosinophil % 4.8 %      Basophil % 0.4 %      Immature Grans % 0.3 %      Neutrophils, Absolute 4.77 10*3/mm3      Lymphocytes, Absolute 4.92 10*3/mm3      Monocytes, Absolute 0.93 10*3/mm3      Eosinophils, Absolute 0.54 10*3/mm3      Basophils, Absolute 0.05 10*3/mm3      Immature Grans, Absolute 0.03 10*3/mm3      nRBC 0.0 /100 WBC            Imaging:    XR Chest 1 View    Result Date: 4/5/2023  PROCEDURE: XR  "CHEST 1 VW  COMPARISON: 12/10/2020.  INDICATIONS: Dysrhythmia.  FINDINGS:  A single AP (or PA) upright portable chest radiograph was performed.  No cardiac enlargement is seen.  No acute infiltrate is appreciated.  No pleural effusion or pneumothorax is identified.  External artifacts obscure detail.  Chronic calcified granulomatous disease involves the chest.  There is slight chronic asymmetric elevation right diaphragm, seen previously.  There are degenerative changes of the imaged spine.  No significant interval change is seen since the prior study (or studies).        No acute infiltrate is appreciated.     Please note that portions of this note were completed with a voice recognition program.  PARVEZ CEJA JR, MD       Electronically Signed and Approved By: PARVEZ CEJA JR, MD on 4/05/2023 at 7:24                  Differential Diagnosis and Discussion:    Palpitations: Differential diagnosis includes but is not limited to anxiety, atrioventricular blocks, mitral valve disease, hypoxia, coronary artery disease, hypokalemia, anemia, fever, COPD, congestive heart failure, pericarditis, Johnathan-Parkinson-White syndrome, pulmonary embolism, SVT, atrial fibrillation, atrial flutter, sinus tachycardia, thyrotoxicosis, and pheochromocytoma.    All labs were reviewed and interpreted by me.  All X-rays were independently reviewed by me.  EKG was interpreted by me.    MDM                 This patient is a pleasant 52-year-old male with history of atrial fibrillation currently anticoagulated and on flecainide and metoprolol, who presents with \"his heart being out of rhythm\" and palpitations.    He was noted to be in abnormally fast heart rhythm with heart rate of 135 to 140 bpm but he was in a regular appearing rhythm but not sinus, without P waves present.    I considered junctional tachycardia likely, and I do not see any signs of A-fib or atrial flutter today.    I gave him a dose of IV metoprolol for rate control and " check some electrolytes and troponins and everything so far is coming back unremarkable.    I observed him on cardiac monitoring for a time and was about to call his cardiologist for further treatment recommendations when he suddenly converted to a normal sinus rhythm and felt asymptomatic now.    We repeated the EKG showing normal sinus rhythm and I encouraged him to make sure he takes his medications as directed and reach out to Dr. Thayer, his cardiologist to let him know about this episode of arrhythmia.                Critical Care Note: Total Critical Care time of 35 minutes. Total critical care time documented does not include time spent on separately billed procedures for services of nurses or physician assistants. I personally saw and examined the patient. I have reviewed all diagnostic interpretations and treatment plans as written. I was present for the key portions of any procedures performed and the inclusive time noted in any critical care statement. Critical care time includes patient management by me, time spent at the patients bedside,  time to review lab and imaging results, discussing patient care, documentation in the medical record, and time spent with family or caregiver.    Patient Care Considerations:          Consultants/Shared Management Plan:        Social Determinants of Health:    Patient is independent, reliable, and has access to care.       Disposition and Care Coordination:    Discharged: I considered escalation of care by admitting this patient for observation, however the patient has improved and is suitable and  stable for discharge.    I have explained the patient´s condition, diagnoses and treatment plan based on the information available to me at this time. I have answered questions and addressed any concerns. The patient has a good  understanding of the patient´s diagnosis, condition, and treatment plan as can be expected at this point. The vital signs have been stable. The  patient´s condition is stable and appropriate for discharge from the emergency department.      The patient will pursue further outpatient evaluation with the primary care physician or other designated or consulting physician as outlined in the discharge instructions. They are agreeable to this plan of care and follow-up instructions have been explained in detail. The patient has received these instructions in written format and have expressed an understanding of the discharge instructions. The patient is aware that any significant change in condition or worsening of symptoms should prompt an immediate return to this or the closest emergency department or call to 911.  I have explained discharge medications and the need for follow up with the patient/caretakers. This was also printed in the discharge instructions. Patient was discharged with the following medications and follow up:      Medication List      No changes were made to your prescriptions during this visit.      Matias Thayer MD  [facility] [place] [address]  [phone]    Call in 1 day  As needed, for a follow-up appointment       Final diagnoses:   Paroxysmal junctional tachycardia   Heart palpitations        ED Disposition     ED Disposition   Discharge    Condition   Stable    Comment   --             This medical record created using voice recognition software.        Documentation assistance provided by Sarah Red acting as scribe for No att. providers found. Information recorded by the scribe was done at my direction and has been verified and validated by me.          Sarah Red  04/05/23 0649       Sarah Red  04/05/23 0704       Sarah Red  04/05/23 0704       Harry Anders MD  04/05/23 4359

## 2023-04-05 NOTE — DISCHARGE INSTRUCTIONS
You had an abnormally fast heart rhythm today on arrival going about 135 to 140 bpm, but it did not look like A-fib this time.    You were given a dose of IV metoprolol in the emergency department and you eventually converted back to a normal sinus rhythm.    Your lab work looks okay and no signs of a heart attack or anything life-threatening was found at this time.    Please let Dr. Thayer know that you had another 1 of these episodes to make sure he does not need to increase any of your medication dosages.

## 2023-04-05 NOTE — Clinical Note
[facility] EMERGENCY ROOM  [address]  [place] [address]  Phone: [phone]    Tushar Castillo was seen and treated in our emergency department on 4/5/2023.  He may return to work on 04/06/2023.         Thank you for choosing [facility].    Harry Anders MD

## 2023-04-05 NOTE — Clinical Note
[facility] EMERGENCY ROOM  [address]  [place] [address]  Phone: [phone]    ANGIE JOAQUIN accompanied Tushar Castillo to the emergency department on 4/5/2023. They may return to work on 04/06/2023.        Thank you for choosing [facility].    Isha Kilgore RN

## 2023-04-06 LAB — QT INTERVAL: 368 MS

## 2023-04-30 LAB — QT INTERVAL: 318 MS

## 2023-07-18 PROBLEM — K76.0 FATTY LIVER: Status: ACTIVE | Noted: 2022-06-18

## 2023-07-18 PROBLEM — E55.9 VITAMIN D DEFICIENCY: Status: ACTIVE | Noted: 2023-07-18

## 2023-07-18 PROBLEM — G47.00 INSOMNIA: Status: ACTIVE | Noted: 2021-05-11

## 2023-07-18 PROBLEM — M10.9 GOUT: Status: ACTIVE | Noted: 2021-11-12

## 2023-07-18 PROBLEM — G47.33 OSA (OBSTRUCTIVE SLEEP APNEA): Status: ACTIVE | Noted: 2021-08-20

## 2023-07-18 PROBLEM — M17.11 PRIMARY OSTEOARTHRITIS OF RIGHT KNEE: Status: ACTIVE | Noted: 2018-02-14

## 2023-07-18 PROBLEM — S83.249A TEAR OF MEDIAL MENISCUS OF KNEE: Status: ACTIVE | Noted: 2018-02-15

## 2023-07-18 PROBLEM — J30.2 SEASONAL ALLERGIC RHINITIS: Status: ACTIVE | Noted: 2023-07-18

## 2023-11-29 ENCOUNTER — TRANSCRIBE ORDERS (OUTPATIENT)
Dept: ADMINISTRATIVE | Facility: HOSPITAL | Age: 53
End: 2023-11-29
Payer: OTHER GOVERNMENT

## 2023-11-29 DIAGNOSIS — M79.604 LOWER EXTREMITY PAIN, INFERIOR, RIGHT: ICD-10-CM

## 2023-11-29 DIAGNOSIS — M79.89 SWELLING OF LIMB: Primary | ICD-10-CM

## 2023-11-30 ENCOUNTER — HOSPITAL ENCOUNTER (OUTPATIENT)
Dept: CARDIOLOGY | Facility: HOSPITAL | Age: 53
Discharge: HOME | End: 2023-11-30
Payer: OTHER GOVERNMENT

## 2023-11-30 DIAGNOSIS — M79.89 SWELLING OF LIMB: ICD-10-CM

## 2023-11-30 DIAGNOSIS — M79.604 LOWER EXTREMITY PAIN, INFERIOR, RIGHT: ICD-10-CM

## 2023-11-30 LAB
BH CV LOWER VASCULAR LEFT COMMON FEMORAL AUGMENT: NORMAL
BH CV LOWER VASCULAR LEFT COMMON FEMORAL COMPETENT: NORMAL
BH CV LOWER VASCULAR LEFT COMMON FEMORAL COMPRESS: NORMAL
BH CV LOWER VASCULAR LEFT COMMON FEMORAL PHASIC: NORMAL
BH CV LOWER VASCULAR LEFT COMMON FEMORAL SPONT: NORMAL
BH CV LOWER VASCULAR RIGHT COMMON FEMORAL AUGMENT: NORMAL
BH CV LOWER VASCULAR RIGHT COMMON FEMORAL COMPETENT: NORMAL
BH CV LOWER VASCULAR RIGHT COMMON FEMORAL COMPRESS: NORMAL
BH CV LOWER VASCULAR RIGHT COMMON FEMORAL PHASIC: NORMAL
BH CV LOWER VASCULAR RIGHT COMMON FEMORAL SPONT: NORMAL
BH CV LOWER VASCULAR RIGHT DISTAL FEMORAL COMPRESS: NORMAL
BH CV LOWER VASCULAR RIGHT GASTRONEMIUS COMPRESS: NORMAL
BH CV LOWER VASCULAR RIGHT GREATER SAPH AK COMPRESS: NORMAL
BH CV LOWER VASCULAR RIGHT GREATER SAPH BK COMPRESS: NORMAL
BH CV LOWER VASCULAR RIGHT LESSER SAPH COMPRESS: NORMAL
BH CV LOWER VASCULAR RIGHT MID FEMORAL AUGMENT: NORMAL
BH CV LOWER VASCULAR RIGHT MID FEMORAL COMPETENT: NORMAL
BH CV LOWER VASCULAR RIGHT MID FEMORAL COMPRESS: NORMAL
BH CV LOWER VASCULAR RIGHT MID FEMORAL PHASIC: NORMAL
BH CV LOWER VASCULAR RIGHT MID FEMORAL SPONT: NORMAL
BH CV LOWER VASCULAR RIGHT PERONEAL COMPRESS: NORMAL
BH CV LOWER VASCULAR RIGHT POPLITEAL AUGMENT: NORMAL
BH CV LOWER VASCULAR RIGHT POPLITEAL COMPETENT: NORMAL
BH CV LOWER VASCULAR RIGHT POPLITEAL COMPRESS: NORMAL
BH CV LOWER VASCULAR RIGHT POPLITEAL PHASIC: NORMAL
BH CV LOWER VASCULAR RIGHT POPLITEAL SPONT: NORMAL
BH CV LOWER VASCULAR RIGHT POSTERIOR TIBIAL COMPRESS: NORMAL
BH CV LOWER VASCULAR RIGHT PROXIMAL FEMORAL COMPRESS: NORMAL
BH CV LOWER VASCULAR RIGHT SAPHENOFEMORAL JUNCTION COMPRESS: NORMAL
BH CV LOWER VASCULAR RIGHT SOLEAL COMPRESS: NORMAL

## 2023-11-30 PROCEDURE — 93971 EXTREMITY STUDY: CPT

## 2024-02-07 ENCOUNTER — OFFICE VISIT (OUTPATIENT)
Dept: CARDIOLOGY | Facility: CLINIC | Age: 54
End: 2024-02-07
Payer: OTHER GOVERNMENT

## 2024-02-07 VITALS
WEIGHT: 303 LBS | SYSTOLIC BLOOD PRESSURE: 141 MMHG | BODY MASS INDEX: 41.04 KG/M2 | HEIGHT: 72 IN | HEART RATE: 98 BPM | DIASTOLIC BLOOD PRESSURE: 78 MMHG

## 2024-02-07 DIAGNOSIS — E78.5 DYSLIPIDEMIA: ICD-10-CM

## 2024-02-07 DIAGNOSIS — I10 ESSENTIAL HYPERTENSION: ICD-10-CM

## 2024-02-07 DIAGNOSIS — I48.0 PAROXYSMAL ATRIAL FIBRILLATION: Primary | ICD-10-CM

## 2024-02-07 PROCEDURE — 99214 OFFICE O/P EST MOD 30 MIN: CPT | Performed by: INTERNAL MEDICINE

## 2024-02-07 RX ORDER — AMLODIPINE AND BENAZEPRIL HYDROCHLORIDE 10; 40 MG/1; MG/1
1 CAPSULE ORAL DAILY
Qty: 90 CAPSULE | Refills: 3 | Status: SHIPPED | OUTPATIENT
Start: 2024-02-07

## 2024-02-07 RX ORDER — ROSUVASTATIN CALCIUM 40 MG/1
40 TABLET, COATED ORAL DAILY
COMMUNITY
Start: 2023-09-26

## 2024-02-07 NOTE — ASSESSMENT & PLAN NOTE
Mild systolic elevation in office today recommend up titration of his Lotrel to 10/40 daily as well as for patient keep a home blood pressure log at home for review on next visit

## 2024-02-07 NOTE — ASSESSMENT & PLAN NOTE
Maintain normal sinus rhythm continue with flecainide 50 twice daily will repeat EKG next visit.  Patient is on Pradaxa 150 twice daily for CVA prevention

## 2024-02-07 NOTE — PROGRESS NOTES
Chief Complaint  Follow-up, Atrial Fibrillation, and Hypertension    Subjective    Patient is doing well symptomatically he occasionally has breakthrough flutters but no sustained tachycardic episodes.  He has not had a change in his breathing ability and no chest pain  Past Medical History:   Diagnosis Date    Acute medial meniscus tear of left knee 02/15/2018    Arthritis     Atrial fibrillation     Hyperlipemia     Hypertension     Limb swelling     Primary osteoarthritis of left knee 02/14/2018    Seasonal allergies     Sleep apnea          Current Outpatient Medications:     dabigatran etexilate (PRADAXA) 150 MG capsu, Pradaxa 150 mg oral capsule take 1 capsule (150 mg) by oral route 2 times per day   Active, Disp: , Rfl:     ergocalciferol (ERGOCALCIFEROL) 1.25 MG (41118 UT) capsule, , Disp: , Rfl:     fexofenadine (ALLEGRA) 180 MG tablet, Daily., Disp: , Rfl:     flecainide (TAMBOCOR) 50 MG tablet, Take 1 tablet by mouth 2 (Two) Times a Day., Disp: 180 tablet, Rfl: 3    hydrALAZINE (APRESOLINE) 25 MG tablet, 4 tablets., Disp: , Rfl:     hydroCHLOROthiazide (HYDRODIURIL) 25 MG tablet, hydrochlorothiazide 25 mg oral tablet take 1 tablet (25 mg) by oral route once daily   Active, Disp: , Rfl:     metoprolol succinate XL (TOPROL-XL) 100 MG 24 hr tablet, Take 1 tablet by mouth Daily., Disp: , Rfl:     mirtazapine (REMERON) 30 MG tablet, Take 1 tablet by mouth Every Night., Disp: , Rfl:     multivitamin with minerals tablet tablet, Take 1 tablet by mouth Daily., Disp: , Rfl:     rosuvastatin (CRESTOR) 40 MG tablet, Take 1 tablet by mouth Daily., Disp: , Rfl:     amLODIPine-benazepril (Lotrel) 10-40 MG per capsule, Take 1 capsule by mouth Daily., Disp: 90 capsule, Rfl: 3    Medications Discontinued During This Encounter   Medication Reason    rosuvastatin (CRESTOR) 20 MG tablet *Error    amLODIPine-benazepril (LOTREL) 5-40 MG per capsule      Allergies   Allergen Reactions    Compazine [Prochlorperazine] Paresthesia  "    Jaws        Social History     Tobacco Use    Smoking status: Never    Smokeless tobacco: Never   Vaping Use    Vaping Use: Never used   Substance Use Topics    Alcohol use: Yes     Comment: LIGHT    Drug use: Never       Family History   Problem Relation Age of Onset    Diabetes Mother     Arthritis Mother     Cancer Father         Objective     /78   Pulse 98   Ht 182.9 cm (72\")   Wt (!) 137 kg (303 lb)   BMI 41.09 kg/m²       Physical Exam    General Appearance:   no acute distress  Alert and oriented x3  HENT:   lips not cyanotic  Atraumatic  Neck:  No jvd   supple  Respiratory:  no respiratory distress  normal breath sounds  no rales  Cardiovascular:  Regular rate and rhythm  no S3, no S4   no murmur  no rub  Extremities  No cyanosis  lower extremity edema: none    Skin:   warm, dry  No rashes      Result Review :     No results found for: \"PROBNP\"  CMP          4/5/2023    06:38   CMP   Glucose 159    BUN 13    Creatinine 1.02    EGFR 87.9    Sodium 140    Potassium 3.7    Chloride 102    Calcium 9.1    Total Protein 8.1    Albumin 4.1    Globulin 4.0    Total Bilirubin 0.2    Alkaline Phosphatase 110    AST (SGOT) 57    ALT (SGPT) 68    Albumin/Globulin Ratio 1.0    BUN/Creatinine Ratio 12.7    Anion Gap 12.0      CBC w/diff          4/5/2023    06:38 9/28/2023    07:09 11/29/2023    14:48   CBC w/Diff   WBC 11.24  12.63     12.43       RBC 5.25  5.01     5.06       Hemoglobin 15.2  14.2     14.4       Hematocrit 44.3  42.5     43.5       MCV 84.4  84.8     86.0       MCH 29.0  28.3     28.5       MCHC 34.3  33.4     33.1       RDW 13.7  13.6     14.3       Platelets 311  230     287       Neutrophil Rel % 42.4  51.3     58.7       Immature Granulocyte Rel % 0.3  0.2     0.2       Lymphocyte Rel % 43.8  34.6     29.8       Monocyte Rel % 8.3  8.6     7.5       Eosinophil Rel % 4.8  4.9     3.5       Basophil Rel % 0.4  0.4     0.3          Details          This result is from an external source. " "             No results found for: \"TSH\"   No results found for: \"FREET4\"   No results found for: \"DDIMERQUANT\"  Magnesium   Date Value Ref Range Status   04/05/2023 2.0 1.6 - 2.6 mg/dL Final      No results found for: \"DIGOXIN\"   Lab Results   Component Value Date    TROPONINT 13 04/05/2023             No results found for: \"POCTROP\"                   Diagnoses and all orders for this visit:    1. Paroxysmal atrial fibrillation (Primary)  Assessment & Plan:  Maintain normal sinus rhythm continue with flecainide 50 twice daily will repeat EKG next visit.  Patient is on Pradaxa 150 twice daily for CVA prevention      2. Dyslipidemia    3. Essential hypertension  Assessment & Plan:  Mild systolic elevation in office today recommend up titration of his Lotrel to 10/40 daily as well as for patient keep a home blood pressure log at home for review on next visit      Other orders  -     amLODIPine-benazepril (Lotrel) 10-40 MG per capsule; Take 1 capsule by mouth Daily.  Dispense: 90 capsule; Refill: 3            Follow Up     Return in about 6 months (around 8/7/2024) for Follow with Em Monroe, EKG with F/U.          Patient was given instructions and counseling regarding his condition or for health maintenance advice. Please see specific information pulled into the AVS if appropriate.       "

## 2024-04-25 ENCOUNTER — APPOINTMENT (OUTPATIENT)
Dept: GENERAL RADIOLOGY | Facility: HOSPITAL | Age: 54
DRG: 638 | End: 2024-04-25
Payer: OTHER GOVERNMENT

## 2024-04-25 ENCOUNTER — APPOINTMENT (OUTPATIENT)
Dept: CT IMAGING | Facility: HOSPITAL | Age: 54
DRG: 638 | End: 2024-04-25
Payer: OTHER GOVERNMENT

## 2024-04-25 ENCOUNTER — HOSPITAL ENCOUNTER (INPATIENT)
Facility: HOSPITAL | Age: 54
LOS: 2 days | Discharge: HOME | DRG: 638 | End: 2024-04-28
Attending: EMERGENCY MEDICINE | Admitting: FAMILY MEDICINE
Payer: OTHER GOVERNMENT

## 2024-04-25 DIAGNOSIS — K29.70 GASTROESOPHAGITIS: ICD-10-CM

## 2024-04-25 DIAGNOSIS — E11.10 DIABETIC KETOACIDOSIS WITHOUT COMA ASSOCIATED WITH TYPE 2 DIABETES MELLITUS: Primary | ICD-10-CM

## 2024-04-25 DIAGNOSIS — R11.2 NAUSEA AND VOMITING, UNSPECIFIED VOMITING TYPE: ICD-10-CM

## 2024-04-25 DIAGNOSIS — K20.90 GASTROESOPHAGITIS: ICD-10-CM

## 2024-04-25 LAB
ACETONE BLD QL: ABNORMAL
ALBUMIN SERPL-MCNC: 4.8 G/DL (ref 3.5–5.2)
ALBUMIN/GLOB SERPL: 0.9 G/DL
ALP SERPL-CCNC: 139 U/L (ref 39–117)
ALT SERPL W P-5'-P-CCNC: 25 U/L (ref 1–41)
ANION GAP SERPL CALCULATED.3IONS-SCNC: 35.2 MMOL/L (ref 5–15)
AST SERPL-CCNC: 25 U/L (ref 1–40)
BACTERIA UR QL AUTO: ABNORMAL /HPF
BASOPHILS # BLD AUTO: 0.04 10*3/MM3 (ref 0–0.2)
BASOPHILS NFR BLD AUTO: 0.3 % (ref 0–1.5)
BILIRUB SERPL-MCNC: 0.3 MG/DL (ref 0–1.2)
BILIRUB UR QL STRIP: NEGATIVE
BUN SERPL-MCNC: 6 MG/DL (ref 6–20)
BUN/CREAT SERPL: 4.1 (ref 7–25)
CALCIUM SPEC-SCNC: 10.2 MG/DL (ref 8.6–10.5)
CHLORIDE SERPL-SCNC: 92 MMOL/L (ref 98–107)
CLARITY UR: CLEAR
CO2 SERPL-SCNC: 7.8 MMOL/L (ref 22–29)
COLOR UR: YELLOW
CREAT SERPL-MCNC: 1.48 MG/DL (ref 0.76–1.27)
D-LACTATE SERPL-SCNC: 1.5 MMOL/L (ref 0.5–2)
DEPRECATED RDW RBC AUTO: 42.5 FL (ref 37–54)
EGFRCR SERPLBLD CKD-EPI 2021: 55.9 ML/MIN/1.73
EOSINOPHIL # BLD AUTO: 0.01 10*3/MM3 (ref 0–0.4)
EOSINOPHIL NFR BLD AUTO: 0.1 % (ref 0.3–6.2)
ERYTHROCYTE [DISTWIDTH] IN BLOOD BY AUTOMATED COUNT: 13.6 % (ref 12.3–15.4)
ETHANOL BLD-MCNC: <10 MG/DL (ref 0–10)
ETHANOL UR QL: <0.01 %
GLOBULIN UR ELPH-MCNC: 5.1 GM/DL
GLUCOSE SERPL-MCNC: 363 MG/DL (ref 65–99)
GLUCOSE UR STRIP-MCNC: ABNORMAL MG/DL
HCT VFR BLD AUTO: 45.6 % (ref 37.5–51)
HGB BLD-MCNC: 15.2 G/DL (ref 13–17.7)
HGB UR QL STRIP.AUTO: ABNORMAL
HOLD SPECIMEN: NORMAL
HOLD SPECIMEN: NORMAL
HYALINE CASTS UR QL AUTO: ABNORMAL /LPF
IMM GRANULOCYTES # BLD AUTO: 0.08 10*3/MM3 (ref 0–0.05)
IMM GRANULOCYTES NFR BLD AUTO: 0.6 % (ref 0–0.5)
KETONES UR QL STRIP: ABNORMAL
LEUKOCYTE ESTERASE UR QL STRIP.AUTO: NEGATIVE
LYMPHOCYTES # BLD AUTO: 2.33 10*3/MM3 (ref 0.7–3.1)
LYMPHOCYTES NFR BLD AUTO: 16.3 % (ref 19.6–45.3)
MCH RBC QN AUTO: 28.6 PG (ref 26.6–33)
MCHC RBC AUTO-ENTMCNC: 33.3 G/DL (ref 31.5–35.7)
MCV RBC AUTO: 85.9 FL (ref 79–97)
MONOCYTES # BLD AUTO: 1.25 10*3/MM3 (ref 0.1–0.9)
MONOCYTES NFR BLD AUTO: 8.7 % (ref 5–12)
NEUTROPHILS NFR BLD AUTO: 10.61 10*3/MM3 (ref 1.7–7)
NEUTROPHILS NFR BLD AUTO: 74 % (ref 42.7–76)
NITRITE UR QL STRIP: NEGATIVE
NRBC BLD AUTO-RTO: 0 /100 WBC (ref 0–0.2)
NT-PROBNP SERPL-MCNC: 59.2 PG/ML (ref 0–900)
PH BLDV: 7.17 PH UNITS (ref 7.31–7.41)
PH UR STRIP.AUTO: <=5 [PH] (ref 5–8)
PLATELET # BLD AUTO: 257 10*3/MM3 (ref 140–450)
PMV BLD AUTO: 11.3 FL (ref 6–12)
POTASSIUM SERPL-SCNC: 4.8 MMOL/L (ref 3.5–5.2)
PROT SERPL-MCNC: 9.9 G/DL (ref 6–8.5)
PROT UR QL STRIP: ABNORMAL
RBC # BLD AUTO: 5.31 10*6/MM3 (ref 4.14–5.8)
RBC # UR STRIP: ABNORMAL /HPF
REF LAB TEST METHOD: ABNORMAL
SALICYLATES SERPL-MCNC: <0.3 MG/DL
SODIUM SERPL-SCNC: 135 MMOL/L (ref 136–145)
SP GR UR STRIP: 1.03 (ref 1–1.03)
SQUAMOUS #/AREA URNS HPF: ABNORMAL /HPF
TROPONIN T SERPL HS-MCNC: 17 NG/L
UROBILINOGEN UR QL STRIP: ABNORMAL
WBC # UR STRIP: ABNORMAL /HPF
WBC NRBC COR # BLD AUTO: 14.32 10*3/MM3 (ref 3.4–10.8)
WHOLE BLOOD HOLD COAG: NORMAL
WHOLE BLOOD HOLD SPECIMEN: NORMAL

## 2024-04-25 PROCEDURE — 83605 ASSAY OF LACTIC ACID: CPT | Performed by: EMERGENCY MEDICINE

## 2024-04-25 PROCEDURE — 93005 ELECTROCARDIOGRAM TRACING: CPT

## 2024-04-25 PROCEDURE — 80053 COMPREHEN METABOLIC PANEL: CPT

## 2024-04-25 PROCEDURE — 80179 DRUG ASSAY SALICYLATE: CPT | Performed by: EMERGENCY MEDICINE

## 2024-04-25 PROCEDURE — 82077 ASSAY SPEC XCP UR&BREATH IA: CPT | Performed by: EMERGENCY MEDICINE

## 2024-04-25 PROCEDURE — 83880 ASSAY OF NATRIURETIC PEPTIDE: CPT

## 2024-04-25 PROCEDURE — 87040 BLOOD CULTURE FOR BACTERIA: CPT | Performed by: EMERGENCY MEDICINE

## 2024-04-25 PROCEDURE — 99291 CRITICAL CARE FIRST HOUR: CPT

## 2024-04-25 PROCEDURE — 71045 X-RAY EXAM CHEST 1 VIEW: CPT

## 2024-04-25 PROCEDURE — 85025 COMPLETE CBC W/AUTO DIFF WBC: CPT

## 2024-04-25 PROCEDURE — 25510000001 IOPAMIDOL PER 1 ML: Performed by: EMERGENCY MEDICINE

## 2024-04-25 PROCEDURE — 83930 ASSAY OF BLOOD OSMOLALITY: CPT | Performed by: FAMILY MEDICINE

## 2024-04-25 PROCEDURE — 81001 URINALYSIS AUTO W/SCOPE: CPT | Performed by: EMERGENCY MEDICINE

## 2024-04-25 PROCEDURE — 82009 KETONE BODYS QUAL: CPT | Performed by: EMERGENCY MEDICINE

## 2024-04-25 PROCEDURE — 74177 CT ABD & PELVIS W/CONTRAST: CPT

## 2024-04-25 PROCEDURE — 93005 ELECTROCARDIOGRAM TRACING: CPT | Performed by: EMERGENCY MEDICINE

## 2024-04-25 PROCEDURE — 82800 BLOOD PH: CPT | Performed by: EMERGENCY MEDICINE

## 2024-04-25 PROCEDURE — 93010 ELECTROCARDIOGRAM REPORT: CPT | Performed by: INTERNAL MEDICINE

## 2024-04-25 PROCEDURE — 25010000002 ONDANSETRON PER 1 MG: Performed by: EMERGENCY MEDICINE

## 2024-04-25 PROCEDURE — 25810000003 SODIUM CHLORIDE 0.9 % SOLUTION: Performed by: EMERGENCY MEDICINE

## 2024-04-25 PROCEDURE — 36415 COLL VENOUS BLD VENIPUNCTURE: CPT | Performed by: EMERGENCY MEDICINE

## 2024-04-25 PROCEDURE — 84484 ASSAY OF TROPONIN QUANT: CPT

## 2024-04-25 RX ORDER — ONDANSETRON 2 MG/ML
4 INJECTION INTRAMUSCULAR; INTRAVENOUS ONCE
Status: COMPLETED | OUTPATIENT
Start: 2024-04-25 | End: 2024-04-25

## 2024-04-25 RX ORDER — FAMOTIDINE 10 MG/ML
20 INJECTION, SOLUTION INTRAVENOUS ONCE
Status: COMPLETED | OUTPATIENT
Start: 2024-04-25 | End: 2024-04-25

## 2024-04-25 RX ORDER — SODIUM CHLORIDE 0.9 % (FLUSH) 0.9 %
10 SYRINGE (ML) INJECTION AS NEEDED
Status: DISCONTINUED | OUTPATIENT
Start: 2024-04-25 | End: 2024-04-27

## 2024-04-25 RX ADMIN — SODIUM CHLORIDE 1000 ML: 9 INJECTION, SOLUTION INTRAVENOUS at 21:35

## 2024-04-25 RX ADMIN — SODIUM CHLORIDE 1000 ML: 9 INJECTION, SOLUTION INTRAVENOUS at 22:54

## 2024-04-25 RX ADMIN — FAMOTIDINE 20 MG: 10 INJECTION INTRAVENOUS at 22:54

## 2024-04-25 RX ADMIN — ONDANSETRON 4 MG: 2 INJECTION INTRAMUSCULAR; INTRAVENOUS at 21:18

## 2024-04-25 RX ADMIN — IOPAMIDOL 100 ML: 755 INJECTION, SOLUTION INTRAVENOUS at 23:45

## 2024-04-25 NOTE — Clinical Note
Level of Care: Telemetry [5]   Diagnosis: DKA (diabetic ketoacidosis) [888970]   Admitting Physician: BERONICA SAPP [[id number]]   Certification: I Certify That Inpatient Hospital Services Are Medically Necessary For Greater Than 2 Midnights

## 2024-04-26 PROBLEM — R11.2 NAUSEA AND VOMITING: Status: ACTIVE | Noted: 2024-04-26

## 2024-04-26 PROBLEM — E11.10 DKA (DIABETIC KETOACIDOSIS): Status: ACTIVE | Noted: 2024-04-26

## 2024-04-26 LAB
ALBUMIN SERPL-MCNC: 4.2 G/DL (ref 3.5–5.2)
ALBUMIN SERPL-MCNC: 4.3 G/DL (ref 3.5–5.2)
ALBUMIN/GLOB SERPL: 0.8 G/DL
ALBUMIN/GLOB SERPL: 0.9 G/DL
ALP SERPL-CCNC: 121 U/L (ref 39–117)
ALP SERPL-CCNC: 128 U/L (ref 39–117)
ALT SERPL W P-5'-P-CCNC: 22 U/L (ref 1–41)
ALT SERPL W P-5'-P-CCNC: 23 U/L (ref 1–41)
ANION GAP SERPL CALCULATED.3IONS-SCNC: 13.6 MMOL/L (ref 5–15)
ANION GAP SERPL CALCULATED.3IONS-SCNC: 15 MMOL/L (ref 5–15)
ANION GAP SERPL CALCULATED.3IONS-SCNC: 16 MMOL/L (ref 5–15)
ANION GAP SERPL CALCULATED.3IONS-SCNC: 24.8 MMOL/L (ref 5–15)
ANION GAP SERPL CALCULATED.3IONS-SCNC: 27.2 MMOL/L (ref 5–15)
ANION GAP SERPL CALCULATED.3IONS-SCNC: 29.9 MMOL/L (ref 5–15)
AST SERPL-CCNC: 19 U/L (ref 1–40)
AST SERPL-CCNC: 22 U/L (ref 1–40)
BASE EXCESS BLDA CALC-SCNC: -15.9 MMOL/L (ref -2–2)
BASOPHILS # BLD AUTO: 0.04 10*3/MM3 (ref 0–0.2)
BASOPHILS NFR BLD AUTO: 0.3 % (ref 0–1.5)
BDY SITE: ABNORMAL
BILIRUB SERPL-MCNC: 0.2 MG/DL (ref 0–1.2)
BILIRUB SERPL-MCNC: 0.2 MG/DL (ref 0–1.2)
BUN SERPL-MCNC: 4 MG/DL (ref 6–20)
BUN SERPL-MCNC: 4 MG/DL (ref 6–20)
BUN SERPL-MCNC: 5 MG/DL (ref 6–20)
BUN SERPL-MCNC: 6 MG/DL (ref 6–20)
BUN/CREAT SERPL: 3.9 (ref 7–25)
BUN/CREAT SERPL: 4.1 (ref 7–25)
BUN/CREAT SERPL: 4.3 (ref 7–25)
BUN/CREAT SERPL: 4.4 (ref 7–25)
BUN/CREAT SERPL: 4.7 (ref 7–25)
BUN/CREAT SERPL: 5.1 (ref 7–25)
CALCIUM SPEC-SCNC: 8.7 MG/DL (ref 8.6–10.5)
CALCIUM SPEC-SCNC: 8.9 MG/DL (ref 8.6–10.5)
CALCIUM SPEC-SCNC: 9 MG/DL (ref 8.6–10.5)
CALCIUM SPEC-SCNC: 9.2 MG/DL (ref 8.6–10.5)
CALCIUM SPEC-SCNC: 9.3 MG/DL (ref 8.6–10.5)
CALCIUM SPEC-SCNC: 9.4 MG/DL (ref 8.6–10.5)
CHLORIDE SERPL-SCNC: 100 MMOL/L (ref 98–107)
CHLORIDE SERPL-SCNC: 102 MMOL/L (ref 98–107)
CHLORIDE SERPL-SCNC: 103 MMOL/L (ref 98–107)
CHLORIDE SERPL-SCNC: 103 MMOL/L (ref 98–107)
CHLORIDE SERPL-SCNC: 104 MMOL/L (ref 98–107)
CHLORIDE SERPL-SCNC: 104 MMOL/L (ref 98–107)
CO2 SERPL-SCNC: 15 MMOL/L (ref 22–29)
CO2 SERPL-SCNC: 16 MMOL/L (ref 22–29)
CO2 SERPL-SCNC: 17.4 MMOL/L (ref 22–29)
CO2 SERPL-SCNC: 5.1 MMOL/L (ref 22–29)
CO2 SERPL-SCNC: 6.8 MMOL/L (ref 22–29)
CO2 SERPL-SCNC: 9.2 MMOL/L (ref 22–29)
COHGB MFR BLD: 0.1 % (ref 0–1.5)
CREAT SERPL-MCNC: 0.94 MG/DL (ref 0.76–1.27)
CREAT SERPL-MCNC: 1.03 MG/DL (ref 0.76–1.27)
CREAT SERPL-MCNC: 1.07 MG/DL (ref 0.76–1.27)
CREAT SERPL-MCNC: 1.14 MG/DL (ref 0.76–1.27)
CREAT SERPL-MCNC: 1.17 MG/DL (ref 0.76–1.27)
CREAT SERPL-MCNC: 1.21 MG/DL (ref 0.76–1.27)
DEPRECATED RDW RBC AUTO: 44 FL (ref 37–54)
EGFRCR SERPLBLD CKD-EPI 2021: 71.2 ML/MIN/1.73
EGFRCR SERPLBLD CKD-EPI 2021: 74.1 ML/MIN/1.73
EGFRCR SERPLBLD CKD-EPI 2021: 76.4 ML/MIN/1.73
EGFRCR SERPLBLD CKD-EPI 2021: 82.5 ML/MIN/1.73
EGFRCR SERPLBLD CKD-EPI 2021: 86.3 ML/MIN/1.73
EGFRCR SERPLBLD CKD-EPI 2021: 96.3 ML/MIN/1.73
EOSINOPHIL # BLD AUTO: 0 10*3/MM3 (ref 0–0.4)
EOSINOPHIL NFR BLD AUTO: 0 % (ref 0.3–6.2)
ERYTHROCYTE [DISTWIDTH] IN BLOOD BY AUTOMATED COUNT: 14 % (ref 12.3–15.4)
FHHB: 2.7 % (ref 0–5)
GLOBULIN UR ELPH-MCNC: 4.6 GM/DL
GLOBULIN UR ELPH-MCNC: 5.1 GM/DL
GLUCOSE BLDC GLUCOMTR-MCNC: 149 MG/DL (ref 70–99)
GLUCOSE BLDC GLUCOMTR-MCNC: 185 MG/DL (ref 70–99)
GLUCOSE BLDC GLUCOMTR-MCNC: 192 MG/DL (ref 70–99)
GLUCOSE BLDC GLUCOMTR-MCNC: 198 MG/DL (ref 70–99)
GLUCOSE BLDC GLUCOMTR-MCNC: 202 MG/DL (ref 70–99)
GLUCOSE BLDC GLUCOMTR-MCNC: 205 MG/DL (ref 70–99)
GLUCOSE BLDC GLUCOMTR-MCNC: 208 MG/DL (ref 70–99)
GLUCOSE BLDC GLUCOMTR-MCNC: 212 MG/DL (ref 70–99)
GLUCOSE BLDC GLUCOMTR-MCNC: 220 MG/DL (ref 70–99)
GLUCOSE BLDC GLUCOMTR-MCNC: 224 MG/DL (ref 70–99)
GLUCOSE BLDC GLUCOMTR-MCNC: 229 MG/DL (ref 70–99)
GLUCOSE BLDC GLUCOMTR-MCNC: 238 MG/DL (ref 70–99)
GLUCOSE BLDC GLUCOMTR-MCNC: 254 MG/DL (ref 70–99)
GLUCOSE BLDC GLUCOMTR-MCNC: 260 MG/DL (ref 70–99)
GLUCOSE BLDC GLUCOMTR-MCNC: 268 MG/DL (ref 70–99)
GLUCOSE BLDC GLUCOMTR-MCNC: 268 MG/DL (ref 70–99)
GLUCOSE BLDC GLUCOMTR-MCNC: 279 MG/DL (ref 70–99)
GLUCOSE BLDC GLUCOMTR-MCNC: 297 MG/DL (ref 70–99)
GLUCOSE BLDC GLUCOMTR-MCNC: 324 MG/DL (ref 70–99)
GLUCOSE BLDC GLUCOMTR-MCNC: 327 MG/DL (ref 70–99)
GLUCOSE BLDC GLUCOMTR-MCNC: 338 MG/DL (ref 70–99)
GLUCOSE SERPL-MCNC: 128 MG/DL (ref 65–99)
GLUCOSE SERPL-MCNC: 182 MG/DL (ref 65–99)
GLUCOSE SERPL-MCNC: 201 MG/DL (ref 65–99)
GLUCOSE SERPL-MCNC: 243 MG/DL (ref 65–99)
GLUCOSE SERPL-MCNC: 259 MG/DL (ref 65–99)
GLUCOSE SERPL-MCNC: 284 MG/DL (ref 65–99)
HBA1C MFR BLD: 16.6 % (ref 4.8–5.6)
HCO3 BLDA-SCNC: 8.7 MMOL/L (ref 22–26)
HCT VFR BLD AUTO: 46.7 % (ref 37.5–51)
HGB BLD-MCNC: 15.2 G/DL (ref 13–17.7)
HGB BLDA-MCNC: 14 G/DL (ref 13.8–16.4)
IMM GRANULOCYTES # BLD AUTO: 0.07 10*3/MM3 (ref 0–0.05)
IMM GRANULOCYTES NFR BLD AUTO: 0.4 % (ref 0–0.5)
LACTATE BLDA-SCNC: ABNORMAL MMOL/L
LYMPHOCYTES # BLD AUTO: 2.53 10*3/MM3 (ref 0.7–3.1)
LYMPHOCYTES NFR BLD AUTO: 16 % (ref 19.6–45.3)
MAGNESIUM SERPL-MCNC: 1.7 MG/DL (ref 1.6–2.6)
MAGNESIUM SERPL-MCNC: 1.8 MG/DL (ref 1.6–2.6)
MAGNESIUM SERPL-MCNC: 1.9 MG/DL (ref 1.6–2.6)
MAGNESIUM SERPL-MCNC: 2 MG/DL (ref 1.6–2.6)
MAGNESIUM SERPL-MCNC: 2.1 MG/DL (ref 1.6–2.6)
MAGNESIUM SERPL-MCNC: 2.2 MG/DL (ref 1.6–2.6)
MCH RBC QN AUTO: 28.4 PG (ref 26.6–33)
MCHC RBC AUTO-ENTMCNC: 32.5 G/DL (ref 31.5–35.7)
MCV RBC AUTO: 87.1 FL (ref 79–97)
METHGB BLD QL: 0.4 % (ref 0–1.5)
MODALITY: ABNORMAL
MONOCYTES # BLD AUTO: 1.3 10*3/MM3 (ref 0.1–0.9)
MONOCYTES NFR BLD AUTO: 8.2 % (ref 5–12)
NEUTROPHILS NFR BLD AUTO: 11.89 10*3/MM3 (ref 1.7–7)
NEUTROPHILS NFR BLD AUTO: 75.1 % (ref 42.7–76)
NRBC BLD AUTO-RTO: 0 /100 WBC (ref 0–0.2)
OSMOLALITY SERPL: 318 MOSM/KG (ref 275–295)
OSMOLALITY SERPL: 319 MOSM/KG (ref 275–295)
OXYHGB MFR BLDV: 96.8 % (ref 94–99)
PCO2 BLDA: 19.4 MM HG (ref 35–45)
PH BLDA: 7.27 PH UNITS (ref 7.35–7.45)
PHOSPHATE SERPL-MCNC: 1.6 MG/DL (ref 2.5–4.5)
PHOSPHATE SERPL-MCNC: 1.6 MG/DL (ref 2.5–4.5)
PHOSPHATE SERPL-MCNC: 2 MG/DL (ref 2.5–4.5)
PHOSPHATE SERPL-MCNC: 2.1 MG/DL (ref 2.5–4.5)
PHOSPHATE SERPL-MCNC: 2.6 MG/DL (ref 2.5–4.5)
PHOSPHATE SERPL-MCNC: 3.1 MG/DL (ref 2.5–4.5)
PLATELET # BLD AUTO: 235 10*3/MM3 (ref 140–450)
PMV BLD AUTO: 11.4 FL (ref 6–12)
PO2 BLDA: 100 MM HG (ref 80–100)
POTASSIUM SERPL-SCNC: 3.2 MMOL/L (ref 3.5–5.2)
POTASSIUM SERPL-SCNC: 3.5 MMOL/L (ref 3.5–5.2)
POTASSIUM SERPL-SCNC: 3.7 MMOL/L (ref 3.5–5.2)
POTASSIUM SERPL-SCNC: 4.4 MMOL/L (ref 3.5–5.2)
POTASSIUM SERPL-SCNC: 4.5 MMOL/L (ref 3.5–5.2)
POTASSIUM SERPL-SCNC: 5.2 MMOL/L (ref 3.5–5.2)
PROT SERPL-MCNC: 8.8 G/DL (ref 6–8.5)
PROT SERPL-MCNC: 9.4 G/DL (ref 6–8.5)
QT INTERVAL: 347 MS
QTC INTERVAL: 493 MS
RBC # BLD AUTO: 5.36 10*6/MM3 (ref 4.14–5.8)
SAO2 % BLDCOA: 97.3 % (ref 95–99)
SODIUM SERPL-SCNC: 134 MMOL/L (ref 136–145)
SODIUM SERPL-SCNC: 135 MMOL/L (ref 136–145)
SODIUM SERPL-SCNC: 136 MMOL/L (ref 136–145)
SODIUM SERPL-SCNC: 137 MMOL/L (ref 136–145)
WBC NRBC COR # BLD AUTO: 15.83 10*3/MM3 (ref 3.4–10.8)

## 2024-04-26 PROCEDURE — 25810000003 SODIUM CHLORIDE 0.9 % SOLUTION: Performed by: PHYSICIAN ASSISTANT

## 2024-04-26 PROCEDURE — 25010000002 ONDANSETRON PER 1 MG: Performed by: FAMILY MEDICINE

## 2024-04-26 PROCEDURE — 99291 CRITICAL CARE FIRST HOUR: CPT | Performed by: INTERNAL MEDICINE

## 2024-04-26 PROCEDURE — 82948 REAGENT STRIP/BLOOD GLUCOSE: CPT | Performed by: EMERGENCY MEDICINE

## 2024-04-26 PROCEDURE — 0 DEXTROSE 5 % SOLUTION: Performed by: INTERNAL MEDICINE

## 2024-04-26 PROCEDURE — 25010000002 POTASSIUM CHLORIDE PER 2 MEQ: Performed by: FAMILY MEDICINE

## 2024-04-26 PROCEDURE — 83930 ASSAY OF BLOOD OSMOLALITY: CPT | Performed by: EMERGENCY MEDICINE

## 2024-04-26 PROCEDURE — 94660 CPAP INITIATION&MGMT: CPT

## 2024-04-26 PROCEDURE — 84100 ASSAY OF PHOSPHORUS: CPT | Performed by: EMERGENCY MEDICINE

## 2024-04-26 PROCEDURE — 83735 ASSAY OF MAGNESIUM: CPT | Performed by: EMERGENCY MEDICINE

## 2024-04-26 PROCEDURE — 85025 COMPLETE CBC W/AUTO DIFF WBC: CPT | Performed by: EMERGENCY MEDICINE

## 2024-04-26 PROCEDURE — 83050 HGB METHEMOGLOBIN QUAN: CPT | Performed by: FAMILY MEDICINE

## 2024-04-26 PROCEDURE — 99291 CRITICAL CARE FIRST HOUR: CPT | Performed by: FAMILY MEDICINE

## 2024-04-26 PROCEDURE — 82375 ASSAY CARBOXYHB QUANT: CPT | Performed by: FAMILY MEDICINE

## 2024-04-26 PROCEDURE — 94799 UNLISTED PULMONARY SVC/PX: CPT

## 2024-04-26 PROCEDURE — 80053 COMPREHEN METABOLIC PANEL: CPT | Performed by: EMERGENCY MEDICINE

## 2024-04-26 PROCEDURE — 82948 REAGENT STRIP/BLOOD GLUCOSE: CPT

## 2024-04-26 PROCEDURE — 25810000003 LACTATED RINGERS SOLUTION: Performed by: INTERNAL MEDICINE

## 2024-04-26 PROCEDURE — 94761 N-INVAS EAR/PLS OXIMETRY MLT: CPT

## 2024-04-26 PROCEDURE — 83036 HEMOGLOBIN GLYCOSYLATED A1C: CPT | Performed by: EMERGENCY MEDICINE

## 2024-04-26 PROCEDURE — 25810000003 SODIUM CHLORIDE 0.9 % SOLUTION: Performed by: EMERGENCY MEDICINE

## 2024-04-26 PROCEDURE — 82805 BLOOD GASES W/O2 SATURATION: CPT | Performed by: FAMILY MEDICINE

## 2024-04-26 PROCEDURE — 25810000003 SODIUM CHLORIDE 0.9 % SOLUTION: Performed by: FAMILY MEDICINE

## 2024-04-26 PROCEDURE — 36600 WITHDRAWAL OF ARTERIAL BLOOD: CPT | Performed by: FAMILY MEDICINE

## 2024-04-26 RX ORDER — HEPARIN SODIUM 5000 [USP'U]/ML
5000 INJECTION, SOLUTION INTRAVENOUS; SUBCUTANEOUS EVERY 12 HOURS SCHEDULED
Status: DISCONTINUED | OUTPATIENT
Start: 2024-04-26 | End: 2024-04-26

## 2024-04-26 RX ORDER — DEXTROSE MONOHYDRATE, SODIUM CHLORIDE, AND POTASSIUM CHLORIDE 50; 2.98; 4.5 G/1000ML; G/1000ML; G/1000ML
150 INJECTION, SOLUTION INTRAVENOUS CONTINUOUS PRN
Status: DISCONTINUED | OUTPATIENT
Start: 2024-04-26 | End: 2024-04-26 | Stop reason: SDUPTHER

## 2024-04-26 RX ORDER — DEXTROSE MONOHYDRATE 25 G/50ML
10-50 INJECTION, SOLUTION INTRAVENOUS
Status: DISCONTINUED | OUTPATIENT
Start: 2024-04-26 | End: 2024-04-27

## 2024-04-26 RX ORDER — SODIUM CHLORIDE 450 MG/100ML
250 INJECTION, SOLUTION INTRAVENOUS CONTINUOUS PRN
Status: DISCONTINUED | OUTPATIENT
Start: 2024-04-26 | End: 2024-04-27

## 2024-04-26 RX ORDER — NICOTINE POLACRILEX 4 MG
15 LOZENGE BUCCAL
Status: DISCONTINUED | OUTPATIENT
Start: 2024-04-26 | End: 2024-04-27

## 2024-04-26 RX ORDER — PROCHLORPERAZINE EDISYLATE 5 MG/ML
5 INJECTION INTRAMUSCULAR; INTRAVENOUS EVERY 6 HOURS PRN
Status: DISCONTINUED | OUTPATIENT
Start: 2024-04-26 | End: 2024-04-28 | Stop reason: HOSPADM

## 2024-04-26 RX ORDER — SODIUM CHLORIDE AND POTASSIUM CHLORIDE 150; 450 MG/100ML; MG/100ML
250 INJECTION, SOLUTION INTRAVENOUS CONTINUOUS PRN
Status: DISCONTINUED | OUTPATIENT
Start: 2024-04-26 | End: 2024-04-27

## 2024-04-26 RX ORDER — DEXTROSE MONOHYDRATE, SODIUM CHLORIDE, AND POTASSIUM CHLORIDE 50; 1.49; 4.5 G/1000ML; G/1000ML; G/1000ML
150 INJECTION, SOLUTION INTRAVENOUS CONTINUOUS PRN
Status: DISCONTINUED | OUTPATIENT
Start: 2024-04-26 | End: 2024-04-27

## 2024-04-26 RX ORDER — SODIUM CHLORIDE 9 MG/ML
250 INJECTION, SOLUTION INTRAVENOUS CONTINUOUS PRN
Status: DISCONTINUED | OUTPATIENT
Start: 2024-04-26 | End: 2024-04-27

## 2024-04-26 RX ORDER — DEXTROSE AND SODIUM CHLORIDE 5; .45 G/100ML; G/100ML
150 INJECTION, SOLUTION INTRAVENOUS CONTINUOUS PRN
Status: DISCONTINUED | OUTPATIENT
Start: 2024-04-26 | End: 2024-04-27

## 2024-04-26 RX ORDER — METFORMIN HYDROCHLORIDE 500 MG/1
1000 TABLET, EXTENDED RELEASE ORAL
Status: ON HOLD | COMMUNITY
End: 2024-04-28

## 2024-04-26 RX ORDER — SODIUM CHLORIDE 9 MG/ML
40 INJECTION, SOLUTION INTRAVENOUS AS NEEDED
Status: DISCONTINUED | OUTPATIENT
Start: 2024-04-26 | End: 2024-04-26 | Stop reason: SDUPTHER

## 2024-04-26 RX ORDER — SODIUM CHLORIDE 0.9 % (FLUSH) 0.9 %
10 SYRINGE (ML) INJECTION EVERY 12 HOURS SCHEDULED
Status: DISCONTINUED | OUTPATIENT
Start: 2024-04-26 | End: 2024-04-28 | Stop reason: HOSPADM

## 2024-04-26 RX ORDER — BISACODYL 5 MG/1
5 TABLET, DELAYED RELEASE ORAL DAILY PRN
Status: DISCONTINUED | OUTPATIENT
Start: 2024-04-26 | End: 2024-04-28 | Stop reason: HOSPADM

## 2024-04-26 RX ORDER — FENTANYL/ROPIVACAINE/NS/PF 2-625MCG/1
15 PLASTIC BAG, INJECTION (ML) EPIDURAL ONCE
Status: COMPLETED | OUTPATIENT
Start: 2024-04-26 | End: 2024-04-26

## 2024-04-26 RX ORDER — SODIUM CHLORIDE 0.9 % (FLUSH) 0.9 %
10 SYRINGE (ML) INJECTION AS NEEDED
Status: DISCONTINUED | OUTPATIENT
Start: 2024-04-26 | End: 2024-04-27

## 2024-04-26 RX ORDER — ALUMINA, MAGNESIA, AND SIMETHICONE 2400; 2400; 240 MG/30ML; MG/30ML; MG/30ML
15 SUSPENSION ORAL EVERY 6 HOURS PRN
Status: DISCONTINUED | OUTPATIENT
Start: 2024-04-26 | End: 2024-04-28 | Stop reason: HOSPADM

## 2024-04-26 RX ORDER — IBUPROFEN 600 MG/1
1 TABLET ORAL
Status: DISCONTINUED | OUTPATIENT
Start: 2024-04-26 | End: 2024-04-27

## 2024-04-26 RX ORDER — MIRTAZAPINE 15 MG/1
30 TABLET, FILM COATED ORAL NIGHTLY
Status: DISCONTINUED | OUTPATIENT
Start: 2024-04-26 | End: 2024-04-28 | Stop reason: HOSPADM

## 2024-04-26 RX ORDER — DEXTROSE MONOHYDRATE 25 G/50ML
10-50 INJECTION, SOLUTION INTRAVENOUS
Status: DISCONTINUED | OUTPATIENT
Start: 2024-04-26 | End: 2024-04-26 | Stop reason: SDUPTHER

## 2024-04-26 RX ORDER — SODIUM CHLORIDE AND POTASSIUM CHLORIDE 150; 450 MG/100ML; MG/100ML
250 INJECTION, SOLUTION INTRAVENOUS CONTINUOUS PRN
Status: DISCONTINUED | OUTPATIENT
Start: 2024-04-26 | End: 2024-04-26 | Stop reason: SDUPTHER

## 2024-04-26 RX ORDER — SODIUM CHLORIDE 9 MG/ML
40 INJECTION, SOLUTION INTRAVENOUS AS NEEDED
Status: DISCONTINUED | OUTPATIENT
Start: 2024-04-26 | End: 2024-04-28 | Stop reason: HOSPADM

## 2024-04-26 RX ORDER — SODIUM CHLORIDE AND POTASSIUM CHLORIDE 150; 900 MG/100ML; MG/100ML
250 INJECTION, SOLUTION INTRAVENOUS CONTINUOUS PRN
Status: DISCONTINUED | OUTPATIENT
Start: 2024-04-26 | End: 2024-04-26 | Stop reason: SDUPTHER

## 2024-04-26 RX ORDER — DEXTROSE MONOHYDRATE, SODIUM CHLORIDE, AND POTASSIUM CHLORIDE 50; 1.49; 4.5 G/1000ML; G/1000ML; G/1000ML
150 INJECTION, SOLUTION INTRAVENOUS CONTINUOUS PRN
Status: DISCONTINUED | OUTPATIENT
Start: 2024-04-26 | End: 2024-04-26 | Stop reason: SDUPTHER

## 2024-04-26 RX ORDER — LIDOCAINE 4 G/G
1 PATCH TOPICAL DAILY PRN
Status: DISCONTINUED | OUTPATIENT
Start: 2024-04-26 | End: 2024-04-28 | Stop reason: HOSPADM

## 2024-04-26 RX ORDER — FLECAINIDE ACETATE 50 MG/1
50 TABLET ORAL 2 TIMES DAILY
Status: DISCONTINUED | OUTPATIENT
Start: 2024-04-26 | End: 2024-04-28 | Stop reason: HOSPADM

## 2024-04-26 RX ORDER — DEXTROSE MONOHYDRATE, SODIUM CHLORIDE, AND POTASSIUM CHLORIDE 50; 1.49; 9 G/1000ML; G/1000ML; G/1000ML
150 INJECTION, SOLUTION INTRAVENOUS CONTINUOUS PRN
Status: DISCONTINUED | OUTPATIENT
Start: 2024-04-26 | End: 2024-04-27

## 2024-04-26 RX ORDER — NICOTINE POLACRILEX 4 MG
15 LOZENGE BUCCAL
Status: DISCONTINUED | OUTPATIENT
Start: 2024-04-26 | End: 2024-04-26 | Stop reason: SDUPTHER

## 2024-04-26 RX ORDER — SODIUM CHLORIDE 450 MG/100ML
250 INJECTION, SOLUTION INTRAVENOUS CONTINUOUS PRN
Status: DISCONTINUED | OUTPATIENT
Start: 2024-04-26 | End: 2024-04-26 | Stop reason: SDUPTHER

## 2024-04-26 RX ORDER — BISACODYL 10 MG
10 SUPPOSITORY, RECTAL RECTAL DAILY PRN
Status: DISCONTINUED | OUTPATIENT
Start: 2024-04-26 | End: 2024-04-28 | Stop reason: HOSPADM

## 2024-04-26 RX ORDER — ROSUVASTATIN CALCIUM 20 MG/1
40 TABLET, COATED ORAL DAILY
Status: DISCONTINUED | OUTPATIENT
Start: 2024-04-26 | End: 2024-04-28 | Stop reason: HOSPADM

## 2024-04-26 RX ORDER — NITROGLYCERIN 0.4 MG/1
0.4 TABLET SUBLINGUAL
Status: DISCONTINUED | OUTPATIENT
Start: 2024-04-26 | End: 2024-04-28 | Stop reason: HOSPADM

## 2024-04-26 RX ORDER — POLYETHYLENE GLYCOL 3350 17 G/17G
17 POWDER, FOR SOLUTION ORAL DAILY PRN
Status: DISCONTINUED | OUTPATIENT
Start: 2024-04-26 | End: 2024-04-28 | Stop reason: HOSPADM

## 2024-04-26 RX ORDER — FENTANYL/ROPIVACAINE/NS/PF 2-625MCG/1
15 PLASTIC BAG, INJECTION (ML) EPIDURAL
Status: DISCONTINUED | OUTPATIENT
Start: 2024-04-27 | End: 2024-04-26

## 2024-04-26 RX ORDER — SODIUM CHLORIDE AND POTASSIUM CHLORIDE 300; 900 MG/100ML; MG/100ML
250 INJECTION, SOLUTION INTRAVENOUS CONTINUOUS PRN
Status: DISCONTINUED | OUTPATIENT
Start: 2024-04-26 | End: 2024-04-26 | Stop reason: SDUPTHER

## 2024-04-26 RX ORDER — CHOLECALCIFEROL (VITAMIN D3) 125 MCG
5 CAPSULE ORAL NIGHTLY PRN
Status: DISCONTINUED | OUTPATIENT
Start: 2024-04-26 | End: 2024-04-28 | Stop reason: HOSPADM

## 2024-04-26 RX ORDER — DEXTROSE AND SODIUM CHLORIDE 5; .9 G/100ML; G/100ML
150 INJECTION, SOLUTION INTRAVENOUS CONTINUOUS PRN
Status: DISCONTINUED | OUTPATIENT
Start: 2024-04-26 | End: 2024-04-26 | Stop reason: SDUPTHER

## 2024-04-26 RX ORDER — SODIUM CHLORIDE 0.9 % (FLUSH) 0.9 %
10 SYRINGE (ML) INJECTION AS NEEDED
Status: DISCONTINUED | OUTPATIENT
Start: 2024-04-26 | End: 2024-04-28 | Stop reason: HOSPADM

## 2024-04-26 RX ORDER — DEXTROSE AND SODIUM CHLORIDE 5; .45 G/100ML; G/100ML
150 INJECTION, SOLUTION INTRAVENOUS CONTINUOUS PRN
Status: DISCONTINUED | OUTPATIENT
Start: 2024-04-26 | End: 2024-04-26 | Stop reason: SDUPTHER

## 2024-04-26 RX ORDER — ONDANSETRON 2 MG/ML
4 INJECTION INTRAMUSCULAR; INTRAVENOUS EVERY 4 HOURS PRN
Status: DISCONTINUED | OUTPATIENT
Start: 2024-04-26 | End: 2024-04-28 | Stop reason: HOSPADM

## 2024-04-26 RX ORDER — FENTANYL/ROPIVACAINE/NS/PF 2-625MCG/1
15 PLASTIC BAG, INJECTION (ML) EPIDURAL
Qty: 750 ML | Refills: 0 | Status: COMPLETED | OUTPATIENT
Start: 2024-04-26 | End: 2024-04-27

## 2024-04-26 RX ORDER — DEXTROSE MONOHYDRATE, SODIUM CHLORIDE, AND POTASSIUM CHLORIDE 50; 1.49; 9 G/1000ML; G/1000ML; G/1000ML
150 INJECTION, SOLUTION INTRAVENOUS CONTINUOUS PRN
Status: DISCONTINUED | OUTPATIENT
Start: 2024-04-26 | End: 2024-04-26 | Stop reason: SDUPTHER

## 2024-04-26 RX ORDER — HYDROXYZINE HYDROCHLORIDE 25 MG/1
25 TABLET, FILM COATED ORAL 3 TIMES DAILY PRN
Status: DISCONTINUED | OUTPATIENT
Start: 2024-04-26 | End: 2024-04-28 | Stop reason: HOSPADM

## 2024-04-26 RX ORDER — ACETAMINOPHEN 325 MG/1
650 TABLET ORAL EVERY 6 HOURS PRN
Status: DISCONTINUED | OUTPATIENT
Start: 2024-04-26 | End: 2024-04-28 | Stop reason: HOSPADM

## 2024-04-26 RX ORDER — ONDANSETRON 2 MG/ML
4 INJECTION INTRAMUSCULAR; INTRAVENOUS EVERY 6 HOURS PRN
Status: DISCONTINUED | OUTPATIENT
Start: 2024-04-26 | End: 2024-04-26

## 2024-04-26 RX ORDER — POTASSIUM CHLORIDE 750 MG/1
40 CAPSULE, EXTENDED RELEASE ORAL ONCE
Status: COMPLETED | OUTPATIENT
Start: 2024-04-26 | End: 2024-04-26

## 2024-04-26 RX ORDER — DABIGATRAN ETEXILATE 150 MG/1
150 CAPSULE ORAL EVERY 12 HOURS SCHEDULED
Status: DISCONTINUED | OUTPATIENT
Start: 2024-04-26 | End: 2024-04-28 | Stop reason: HOSPADM

## 2024-04-26 RX ORDER — METOPROLOL SUCCINATE 50 MG/1
100 TABLET, EXTENDED RELEASE ORAL DAILY
Status: DISCONTINUED | OUTPATIENT
Start: 2024-04-26 | End: 2024-04-28 | Stop reason: HOSPADM

## 2024-04-26 RX ORDER — DEXTROSE MONOHYDRATE, SODIUM CHLORIDE, AND POTASSIUM CHLORIDE 50; 2.98; 9 G/1000ML; G/1000ML; G/1000ML
150 INJECTION, SOLUTION INTRAVENOUS CONTINUOUS PRN
Status: DISCONTINUED | OUTPATIENT
Start: 2024-04-26 | End: 2024-04-26 | Stop reason: SDUPTHER

## 2024-04-26 RX ORDER — DEXTROSE MONOHYDRATE, SODIUM CHLORIDE, AND POTASSIUM CHLORIDE 50; 2.98; 9 G/1000ML; G/1000ML; G/1000ML
150 INJECTION, SOLUTION INTRAVENOUS CONTINUOUS PRN
Status: DISCONTINUED | OUTPATIENT
Start: 2024-04-26 | End: 2024-04-27

## 2024-04-26 RX ORDER — DEXTROSE AND SODIUM CHLORIDE 5; .9 G/100ML; G/100ML
150 INJECTION, SOLUTION INTRAVENOUS CONTINUOUS PRN
Status: DISCONTINUED | OUTPATIENT
Start: 2024-04-26 | End: 2024-04-27

## 2024-04-26 RX ORDER — SODIUM CHLORIDE AND POTASSIUM CHLORIDE 150; 900 MG/100ML; MG/100ML
250 INJECTION, SOLUTION INTRAVENOUS CONTINUOUS PRN
Status: DISCONTINUED | OUTPATIENT
Start: 2024-04-26 | End: 2024-04-27

## 2024-04-26 RX ORDER — NICOTINE 21 MG/24HR
1 PATCH, TRANSDERMAL 24 HOURS TRANSDERMAL DAILY PRN
Status: DISCONTINUED | OUTPATIENT
Start: 2024-04-26 | End: 2024-04-28 | Stop reason: HOSPADM

## 2024-04-26 RX ORDER — SODIUM CHLORIDE 9 MG/ML
250 INJECTION, SOLUTION INTRAVENOUS CONTINUOUS PRN
Status: DISCONTINUED | OUTPATIENT
Start: 2024-04-26 | End: 2024-04-26 | Stop reason: SDUPTHER

## 2024-04-26 RX ORDER — AMOXICILLIN 250 MG
2 CAPSULE ORAL 2 TIMES DAILY
Status: DISCONTINUED | OUTPATIENT
Start: 2024-04-26 | End: 2024-04-28 | Stop reason: HOSPADM

## 2024-04-26 RX ORDER — SODIUM CHLORIDE AND POTASSIUM CHLORIDE 300; 900 MG/100ML; MG/100ML
250 INJECTION, SOLUTION INTRAVENOUS CONTINUOUS PRN
Status: DISCONTINUED | OUTPATIENT
Start: 2024-04-26 | End: 2024-04-27

## 2024-04-26 RX ORDER — DEXTROSE MONOHYDRATE, SODIUM CHLORIDE, AND POTASSIUM CHLORIDE 50; 2.98; 4.5 G/1000ML; G/1000ML; G/1000ML
150 INJECTION, SOLUTION INTRAVENOUS CONTINUOUS PRN
Status: DISCONTINUED | OUTPATIENT
Start: 2024-04-26 | End: 2024-04-27

## 2024-04-26 RX ORDER — IBUPROFEN 600 MG/1
1 TABLET ORAL
Status: DISCONTINUED | OUTPATIENT
Start: 2024-04-26 | End: 2024-04-26 | Stop reason: SDUPTHER

## 2024-04-26 RX ORDER — HYDROCHLOROTHIAZIDE 25 MG/1
25 TABLET ORAL DAILY
Status: DISCONTINUED | OUTPATIENT
Start: 2024-04-26 | End: 2024-04-28 | Stop reason: HOSPADM

## 2024-04-26 RX ADMIN — POTASSIUM CHLORIDE, DEXTROSE MONOHYDRATE AND SODIUM CHLORIDE 150 ML/HR: 150; 5; 450 INJECTION, SOLUTION INTRAVENOUS at 23:16

## 2024-04-26 RX ADMIN — DABIGATRAN ETEXILATE MESYLATE 150 MG: 150 CAPSULE ORAL at 20:29

## 2024-04-26 RX ADMIN — FLECAINIDE ACETATE 50 MG: 50 TABLET ORAL at 20:29

## 2024-04-26 RX ADMIN — POTASSIUM CHLORIDE, DEXTROSE MONOHYDRATE AND SODIUM CHLORIDE 150 ML/HR: 150; 5; 450 INJECTION, SOLUTION INTRAVENOUS at 11:32

## 2024-04-26 RX ADMIN — SODIUM PHOSPHATE, MONOBASIC, MONOHYDRATE AND SODIUM PHOSPHATE, DIBASIC, ANHYDROUS 15 MMOL: 142; 276 INJECTION, SOLUTION INTRAVENOUS at 10:25

## 2024-04-26 RX ADMIN — Medication 10 ML: at 20:28

## 2024-04-26 RX ADMIN — FLECAINIDE ACETATE 50 MG: 50 TABLET ORAL at 08:22

## 2024-04-26 RX ADMIN — INSULIN HUMAN 8.2 UNITS/HR: 1 INJECTION, SOLUTION INTRAVENOUS at 15:43

## 2024-04-26 RX ADMIN — SODIUM CHLORIDE, POTASSIUM CHLORIDE, SODIUM LACTATE AND CALCIUM CHLORIDE 2000 ML: 600; 310; 30; 20 INJECTION, SOLUTION INTRAVENOUS at 08:22

## 2024-04-26 RX ADMIN — DABIGATRAN ETEXILATE MESYLATE 150 MG: 150 CAPSULE ORAL at 08:22

## 2024-04-26 RX ADMIN — SODIUM CHLORIDE 1000 ML/HR: 9 INJECTION, SOLUTION INTRAVENOUS at 01:33

## 2024-04-26 RX ADMIN — INSULIN HUMAN 2.7 UNITS/HR: 1 INJECTION, SOLUTION INTRAVENOUS at 01:33

## 2024-04-26 RX ADMIN — MIRTAZAPINE 30 MG: 15 TABLET, FILM COATED ORAL at 20:29

## 2024-04-26 RX ADMIN — POTASSIUM CHLORIDE, DEXTROSE MONOHYDRATE AND SODIUM CHLORIDE 150 ML/HR: 300; 5; 450 INJECTION, SOLUTION INTRAVENOUS at 17:07

## 2024-04-26 RX ADMIN — ROSUVASTATIN 40 MG: 20 TABLET, FILM COATED ORAL at 08:22

## 2024-04-26 RX ADMIN — POTASSIUM CHLORIDE, DEXTROSE MONOHYDRATE AND SODIUM CHLORIDE 150 ML/HR: 150; 5; 450 INJECTION, SOLUTION INTRAVENOUS at 04:39

## 2024-04-26 RX ADMIN — POTASSIUM PHOSPHATES 15 MMOL: 236; 224 INJECTION, SOLUTION INTRAVENOUS at 15:43

## 2024-04-26 RX ADMIN — POTASSIUM CHLORIDE AND SODIUM CHLORIDE 250 ML/HR: 450; 150 INJECTION, SOLUTION INTRAVENOUS at 04:05

## 2024-04-26 RX ADMIN — POTASSIUM PHOSPHATES 15 MMOL: 236; 224 INJECTION, SOLUTION INTRAVENOUS at 19:23

## 2024-04-26 RX ADMIN — METOPROLOL SUCCINATE 100 MG: 50 TABLET, EXTENDED RELEASE ORAL at 08:22

## 2024-04-26 RX ADMIN — SENNOSIDES AND DOCUSATE SODIUM 2 TABLET: 50; 8.6 TABLET ORAL at 20:28

## 2024-04-26 RX ADMIN — SODIUM CHLORIDE 1000 ML/HR: 9 INJECTION, SOLUTION INTRAVENOUS at 04:04

## 2024-04-26 RX ADMIN — INSULIN HUMAN 17.7 UNITS/HR: 1 INJECTION, SOLUTION INTRAVENOUS at 22:42

## 2024-04-26 RX ADMIN — Medication 10 ML: at 01:34

## 2024-04-26 RX ADMIN — ACETAMINOPHEN 650 MG: 325 TABLET ORAL at 17:09

## 2024-04-26 RX ADMIN — POTASSIUM PHOSPHATES 15 MMOL: 236; 224 INJECTION, SOLUTION INTRAVENOUS at 23:09

## 2024-04-26 RX ADMIN — ONDANSETRON 4 MG: 2 INJECTION INTRAMUSCULAR; INTRAVENOUS at 04:39

## 2024-04-26 RX ADMIN — POTASSIUM CHLORIDE 40 MEQ: 750 CAPSULE, EXTENDED RELEASE ORAL at 17:07

## 2024-04-26 RX ADMIN — HYDROCHLOROTHIAZIDE 25 MG: 25 TABLET ORAL at 08:22

## 2024-04-26 RX ADMIN — SENNOSIDES AND DOCUSATE SODIUM 2 TABLET: 50; 8.6 TABLET ORAL at 08:22

## 2024-04-26 RX ADMIN — Medication 10 ML: at 08:23

## 2024-04-26 NOTE — CONSULTS
Met with patient at bedside. Demonstrated how ho administer insulin pen injection. Patient was able to demonstrate back using the teach back / show back method. Also provided patient with Diabetes Survival Skills book. Discussed patient's current A1c of 16.6% with an average glucose of 430 mg/dl. Educated that with dietary and lifestyle changes at home as well as new medications upon discharge, it is possible to lower his A1c. No further questions at this time. Will continue to support and assist as needed.

## 2024-04-26 NOTE — PLAN OF CARE
Goal Outcome Evaluation:  Plan of Care Reviewed With: patient        Progress: improving     VSS. A&Ox4. Remains on insulin gtt with fluid replacement. Diabetes education provided and plan of care discussed with patient.

## 2024-04-26 NOTE — PLAN OF CARE
Goal Outcome Evaluation:           Progress: improving     Patient tolerating fluid exchanges and insulin gtt. Labs drawn Q4. No complaints of N/V or abdomen pain. Patient up to date with POC.

## 2024-04-26 NOTE — ED PROVIDER NOTES
Time: 9:54 PM EDT  Date of encounter:  4/25/2024  Independent Historian/Clinical History and Information was obtained by:   Patient    History is limited by: N/A    Chief Complaint: Nausea and vomiting      History of Present Illness:  Patient is a 53 y.o. year old male who presents to the emergency department for evaluation of nausea and vomiting    Patient describes nausea and vomiting that has been persistent over the past 4 days.  He states he has had 1-2 bowel movements in that time.  Which she states is greatly abnormal for him.  He describes diffuse abdominal discomfort but a sensation of acid burning in his epigastric region and upper chest.  He denies any new medications.  No intoxication.  He denies any aspirin use.  He states he has not felt like this before.  He states he was awake all night last night feeling ill and having multiple episodes of vomiting.  He became increasingly short of breath today which prompted his visit to the emergency department.    HPI    Patient Care Team  Primary Care Provider: Lashell Quinn APRN    Past Medical History:     Allergies   Allergen Reactions    Compazine [Prochlorperazine] Paresthesia     Jaws     Past Medical History:   Diagnosis Date    Acute medial meniscus tear of left knee 02/15/2018    Arthritis     Atrial fibrillation     Diabetes mellitus     Hyperlipemia     Hypertension     Limb swelling     Primary osteoarthritis of left knee 02/14/2018    Seasonal allergies     Sleep apnea      Past Surgical History:   Procedure Laterality Date    COLONOSCOPY  2013    INTRAOCULAR LENS INSERTION      LASIK  2005     Family History   Problem Relation Age of Onset    Diabetes Mother     Arthritis Mother     Cancer Father        Home Medications:  Prior to Admission medications    Medication Sig Start Date End Date Taking? Authorizing Provider   amLODIPine-benazepril (Lotrel) 10-40 MG per capsule Take 1 capsule by mouth Daily. 2/7/24   Matias Thayer MD   dabigatran  etexilate (PRADAXA) 150 MG capsu Pradaxa 150 mg oral capsule take 1 capsule (150 mg) by oral route 2 times per day   Active    Alex Dye MD   ergocalciferol (ERGOCALCIFEROL) 1.25 MG (09871 UT) capsule     Alex Dye MD   fexofenadine (ALLEGRA) 180 MG tablet Daily.    Alex Dye MD   flecainide (TAMBOCOR) 50 MG tablet Take 1 tablet by mouth 2 (Two) Times a Day. 7/18/23   Em Monroe APRN   hydrALAZINE (APRESOLINE) 25 MG tablet 4 tablets. 5/12/22   lAex Dye MD   hydroCHLOROthiazide (HYDRODIURIL) 25 MG tablet hydrochlorothiazide 25 mg oral tablet take 1 tablet (25 mg) by oral route once daily   Active    Alex Dye MD   metoprolol succinate XL (TOPROL-XL) 100 MG 24 hr tablet Take 1 tablet by mouth Daily.    Alex Dye MD   mirtazapine (REMERON) 30 MG tablet Take 1 tablet by mouth Every Night.    Alex Dye MD   multivitamin with minerals tablet tablet Take 1 tablet by mouth Daily.    Alex Dye MD   rosuvastatin (CRESTOR) 40 MG tablet Take 1 tablet by mouth Daily. 9/26/23   Alex Dye MD        Social History:   Social History     Tobacco Use    Smoking status: Never    Smokeless tobacco: Never   Vaping Use    Vaping status: Never Used   Substance Use Topics    Alcohol use: Yes     Comment: LIGHT    Drug use: Never         Review of Systems:  Review of Systems   Constitutional:  Positive for fatigue. Negative for chills and fever.   HENT:  Negative for congestion, ear pain and sore throat.    Eyes:  Negative for pain.   Respiratory:  Positive for shortness of breath. Negative for cough and chest tightness.    Cardiovascular:  Negative for chest pain.   Gastrointestinal:  Positive for abdominal pain, constipation, nausea and vomiting. Negative for diarrhea.   Genitourinary:  Negative for flank pain and hematuria.   Musculoskeletal:  Negative for joint swelling.   Skin:  Negative for pallor.   Neurological:   "Positive for weakness. Negative for seizures and headaches.   All other systems reviewed and are negative.       Physical Exam:  /80   Pulse 94   Temp 98.5 °F (36.9 °C) (Oral)   Resp 18   Ht 180.3 cm (71\")   Wt 114 kg (251 lb 12.3 oz)   SpO2 99%   BMI 35.11 kg/m²     Physical Exam  Vitals and nursing note reviewed.   Constitutional:       General: He is not in acute distress.     Appearance: Normal appearance. He is ill-appearing. He is not toxic-appearing.   HENT:      Head: Normocephalic and atraumatic.      Jaw: There is normal jaw occlusion.      Mouth/Throat:      Comments: Dry mucous membranes  Eyes:      General: Lids are normal.      Extraocular Movements: Extraocular movements intact.      Conjunctiva/sclera: Conjunctivae normal.      Pupils: Pupils are equal, round, and reactive to light.   Cardiovascular:      Rate and Rhythm: Regular rhythm. Tachycardia present.      Pulses: Normal pulses.      Heart sounds: Normal heart sounds.   Pulmonary:      Effort: Tachypnea present. No respiratory distress.      Breath sounds: Normal breath sounds. No wheezing or rhonchi.   Abdominal:      General: Abdomen is flat.      Palpations: Abdomen is soft.      Tenderness: There is no abdominal tenderness. There is no guarding or rebound.   Musculoskeletal:         General: Normal range of motion.      Cervical back: Normal range of motion and neck supple.      Right lower leg: No edema.      Left lower leg: No edema.   Skin:     General: Skin is warm and dry.   Neurological:      Mental Status: He is alert and oriented to person, place, and time. Mental status is at baseline.   Psychiatric:         Mood and Affect: Mood normal.           Procedures:  Procedures      Medical Decision Making:      Comorbidities that affect care:    Hyperlipidemia, hypertension, atrial fibrillation, diabetes, chronic anticoagulation    External Notes reviewed:    Previous Clinic Note: Outpatient cardiology visit for paroxysmal " atrial fibrillation 2/7/2024      The following orders were placed and all results were independently analyzed by me:  Orders Placed This Encounter   Procedures    Blood Culture - Blood,    Blood Culture - Blood,    XR Chest 1 View    CT Abdomen Pelvis With Contrast    [place] Draw    Comprehensive Metabolic Panel    BNP    Single High Sensitivity Troponin T    CBC Auto Differential    pH, Venous    Acetone    Urinalysis With Culture If Indicated - Urine, Clean Catch    Lactic Acid, Plasma    Ethanol    Salicylate Level    Urinalysis, Microscopic Only - Urine, Clean Catch    Comprehensive Metabolic Panel    Phosphorus    Magnesium    Osmolality, Serum    Hemoglobin A1c    Basic Metabolic Panel    Magnesium    Phosphorus    CBC Auto Differential    Comprehensive Metabolic Panel    Phosphorus    Magnesium    Osmolality, Serum    Hemoglobin A1c    Basic Metabolic Panel    CBC (No Diff)    Blood Gas, Arterial -With Co-Ox Panel: Yes    Diet: Liquid; Clear Liquid; Fluid Consistency: Thin (IDDSI 0)    Undress & Gown    Vital Signs    Vital Signs    Strict Intake & Output    Daily Weights    Corrected Serum Sodium = Measured Sodium + [1.6 x ((Glucose - 100)/100)]    Do NOT Discontinue Insulin Infusion Until 2 Hours After First Dose of Basal SQ Insulin    Prior to Initiating Glucommander™, Ensure All Prior Insulin Orders Are Discontinued    Do Not Start Insulin Infusion if Potassium < 3.3    Use a Dedicated Line for Insulin Infusion (If Possible).  May Use a Carrier Fluid of NS at KVO Rate if Insulin Rate is Insufficient to Maintain IV Patency.  Prime IV Line With Insulin Infusion    Glucommander Must Be Discontinued if Insulin Infusion is Discontinued.  If Insulin Infusion is Restarted, Previous Glucommander Settings Must Be Discontinued and Re-Entered From New Order    Once DKA Meets Resolution Criteria - Call Provider for Transition Orders From IV to SQ Insulin    Utilize the Start Meal Feature / Meal Bolus Feature in  Glucommander if Patient Starts a Diet or Bolus Tube Feedings    Notify Provider - Insulin Infusion    RN to Release PRN POC Glucose Orders Per Glucommander    RN to Order STAT Glucose For Any POC Glucose <10 or >600    If Insulin Infusion is Paused - Follow Glucommander Instructions    DKA / HHS Patients - Phosphorus of 1 mg/dL or Higher Does NOT Require Replacement (While Insulin Infusing)    Vital Signs    Strict Intake & Output    Daily Weights    Corrected Serum Sodium = Measured Sodium + [1.6 x ((Glucose - 100)/100)]    Do NOT Discontinue Insulin Infusion Until 2 Hours After First Dose of Basal SQ Insulin    Prior to Initiating Glucommander™, Ensure All Prior Insulin Orders Are Discontinued    Do Not Start Insulin Infusion if Potassium < 3.3    Use a Dedicated Line for Insulin Infusion (If Possible).  May Use a Carrier Fluid of NS at KVO Rate if Insulin Rate is Insufficient to Maintain IV Patency.  Prime IV Line With Insulin Infusion    Glucommander Must Be Discontinued if Insulin Infusion is Discontinued.  If Insulin Infusion is Restarted, Previous Glucommander Settings Must Be Discontinued and Re-Entered From New Order    Once DKA Meets Resolution Criteria - Call Provider for Transition Orders From IV to SQ Insulin    Utilize the Start Meal Feature / Meal Bolus Feature in Glucommander if Patient Starts a Diet or Bolus Tube Feedings    Notify Provider - Insulin Infusion    RN to Release PRN POC Glucose Orders Per Glucommander    RN to Order STAT Glucose For Any POC Glucose <10 or >600    If Insulin Infusion is Paused - Follow Glucommander Instructions    DKA / HHS Patients - Phosphorus of 1 mg/dL or Higher Does NOT Require Replacement (While Insulin Infusing)    Vital Signs Every Hour and Per Hospital Policy Based on Patient Condition    Telemetry - Place Orders & Notify Provider of Results When Patient Experiences Acute Chest Pain, Dysrhythmia or Respiratory Distress    Continuous Pulse Oximetry    Height &  Weight    Daily Weights    Intake & Output    Oral Care - Patient Not on NPPV & Not Intubated    Target Arousal Level RASS 0 to -1    Use Mobility Guidelines for Advancement of Activity    Saline Lock & Maintain IV Access    Hospitalist (on-call MD unless specified)    Inpatient Diabetes Educator Consult    Inpatient Diabetes Educator Consult    Inpatient Pulmonology Consult    Patient is on Glucommander    Patient is on Glucommander    Oxygen Therapy- Nasal Cannula; Titrate 1-6 LPM Per SpO2; 90 - 95%    NIPPV-IPPV / BIPAP / CPAP    POC Glucose STAT    POC Glucose PRN    POC Glucose PRN    POC Glucose Once    POC Glucose Once    POC Glucose Once    POC Glucose Once    POC Glucose Once    ECG 12 Lead ED Triage Standing Order; SOA    Insert Peripheral IV    Insert Peripheral IV x2    Insert Peripheral IV x2    Insert Peripheral IV    Inpatient Admission    CBC & Differential    Green Top (Gel)    Lavender Top    Gold Top - SST    Light Blue Top    CBC & Differential       Medications Given in the Emergency Department:  Medications   sodium chloride 0.9 % flush 10 mL (has no administration in time range)   sodium chloride 0.9 % flush 10 mL (10 mL Intravenous Given 4/26/24 0134)   sodium chloride 0.9 % flush 10 mL (has no administration in time range)   Potassium Replacement - Follow Nurse / BPA Driven Protocol (has no administration in time range)   Magnesium Standard Dose Replacement - Follow Nurse / BPA Driven Protocol (has no administration in time range)   Phosphorus Replacement - Follow Nurse / BPA Driven Protocol (has no administration in time range)   Calcium Replacement - Follow Nurse / BPA Driven Protocol (has no administration in time range)   sodium chloride 0.9 % flush 10 mL (has no administration in time range)   sodium chloride 0.9 % flush 10 mL (has no administration in time range)   sodium chloride 0.9 % infusion 40 mL (has no administration in time range)   dextrose (GLUTOSE) oral gel 15 g (has no  administration in time range)   dextrose (D50W) (25 g/50 mL) IV injection 10-50 mL (has no administration in time range)   glucagon (GLUCAGEN) injection 1 mg (has no administration in time range)   sodium chloride 0.9 % bolus (1,000 mL/hr Intravenous New Bag 4/26/24 0404)   sodium chloride 0.9 % infusion (has no administration in time range)   sodium chloride 0.9 % with KCl 20 mEq/L infusion (has no administration in time range)   sodium chloride 0.9 % with KCl 40 mEq/L infusion (has no administration in time range)   dextrose 5 % and sodium chloride 0.9 % infusion (has no administration in time range)   dextrose 5 % and sodium chloride 0.9 % with KCl 20 mEq/L infusion (has no administration in time range)   dextrose 5 % and sodium chloride 0.9 % with KCl 40 mEq/L infusion (has no administration in time range)   sodium chloride 0.45 % infusion (has no administration in time range)   sodium chloride 0.45 % with KCl 20 mEq/L infusion (250 mL/hr Intravenous New Bag 4/26/24 0405)   sodium chloride 0.45 % 1,000 mL with potassium chloride 40 mEq infusion (has no administration in time range)   dextrose 5 % and sodium chloride 0.45 % infusion (has no administration in time range)   dextrose 5 % and sodium chloride 0.45 % with KCl 20 mEq/L infusion (150 mL/hr Intravenous New Bag 4/26/24 0439)   dextrose 5 % and sodium chloride 0.45 % with KCl 40 mEq/L infusion (has no administration in time range)   insulin regular 1 unit/mL in 0.9% sodium chloride (Glucommander) (1 Units/hr Intravenous Currently Infusing 4/26/24 0405)   nitroglycerin (NITROSTAT) SL tablet 0.4 mg (has no administration in time range)   sodium chloride 0.9 % flush 10 mL (has no administration in time range)   sodium chloride 0.9 % flush 10 mL (has no administration in time range)   sodium chloride 0.9 % infusion 40 mL (has no administration in time range)   sennosides-docusate (PERICOLACE) 8.6-50 MG per tablet 2 tablet (has no administration in time range)      And   polyethylene glycol (MIRALAX) packet 17 g (has no administration in time range)     And   bisacodyl (DULCOLAX) EC tablet 5 mg (has no administration in time range)     And   bisacodyl (DULCOLAX) suppository 10 mg (has no administration in time range)   ondansetron (ZOFRAN) injection 4 mg (4 mg Intravenous Given 4/26/24 0439)   rosuvastatin (CRESTOR) tablet 40 mg (has no administration in time range)   mirtazapine (REMERON) tablet 30 mg (has no administration in time range)   metoprolol succinate XL (TOPROL-XL) 24 hr tablet 100 mg (has no administration in time range)   hydroCHLOROthiazide tablet 25 mg (has no administration in time range)   flecainide (TAMBOCOR) tablet 50 mg (has no administration in time range)   dabigatran etexilate (PRADAXA) capsule 150 mg (has no administration in time range)   lactated ringers bolus 2,000 mL (has no administration in time range)   ondansetron (ZOFRAN) injection 4 mg (4 mg Intravenous Given 4/25/24 2118)   sodium chloride 0.9 % bolus 1,000 mL (0 mL Intravenous Stopped 4/26/24 0057)   famotidine (PEPCID) injection 20 mg (20 mg Intravenous Given 4/25/24 2254)   sodium chloride 0.9 % bolus 1,000 mL (0 mL Intravenous Stopped 4/26/24 0057)   iopamidol (ISOVUE-370) 76 % injection 100 mL (100 mL Intravenous Given 4/25/24 2345)        ED Course:         Labs:    Lab Results (last 24 hours)       Procedure Component Value Units Date/Time    CBC & Differential [[id number]]  (Abnormal) Collected: 04/25/24 2039    Specimen: Blood from Arm, Left Updated: 04/25/24 2047    Narrative:      The following orders were created for panel order CBC & Differential.  Procedure                               Abnormality         Status                     ---------                               -----------         ------                     CBC Auto Differential[[id number]]        Abnormal            Final result                 Please view results for these tests on the individual orders.     Comprehensive Metabolic Panel [[id number]]  (Abnormal) Collected: 04/25/24 2039    Specimen: Blood from Arm, Left Updated: 04/25/24 2113     Glucose 363 mg/dL      BUN 6 mg/dL      Creatinine 1.48 mg/dL      Sodium 135 mmol/L      Potassium 4.8 mmol/L      Chloride 92 mmol/L      CO2 7.8 mmol/L      Calcium 10.2 mg/dL      Total Protein 9.9 g/dL      Albumin 4.8 g/dL      ALT (SGPT) 25 U/L      AST (SGOT) 25 U/L      Alkaline Phosphatase 139 U/L      Total Bilirubin 0.3 mg/dL      Globulin 5.1 gm/dL      A/G Ratio 0.9 g/dL      BUN/Creatinine Ratio 4.1     Anion Gap 35.2 mmol/L      eGFR 55.9 mL/min/1.73     Narrative:      GFR Normal >60  Chronic Kidney Disease <60  Kidney Failure <15      BNP [[id number]]  (Normal) Collected: 04/25/24 2039    Specimen: Blood from Arm, Left Updated: 04/25/24 2104     proBNP 59.2 pg/mL     Narrative:      This assay is used as an aid in the diagnosis of individuals suspected of having heart failure. It can be used as an aid in the diagnosis of acute decompensated heart failure (ADHF) in patients presenting with signs and symptoms of ADHF to the emergency department (ED). In addition, NT-proBNP of <300 pg/mL indicates ADHF is not likely.    Age Range Result Interpretation  NT-proBNP Concentration (pg/mL:      <50             Positive            >450                   Gray                 300-450                    Negative             <300    50-75           Positive            >900                  Gray                300-900                  Negative            <300      >75             Positive            >1800                  Gray                300-1800                  Negative            <300    Single High Sensitivity Troponin T [[id number]]  (Normal) Collected: 04/25/24 2039    Specimen: Blood from Arm, Left Updated: 04/25/24 2106     HS Troponin T 17 ng/L     Narrative:      High Sensitive Troponin T Reference Range:  <14.0 ng/L- Negative Female for AMI  <22.0 ng/L- Negative  Male for AMI  >=14 - Abnormal Female indicating possible myocardial injury.  >=22 - Abnormal Male indicating possible myocardial injury.   Clinicians would have to utilize clinical acumen, EKG, Troponin, and serial changes to determine if it is an Acute Myocardial Infarction or myocardial injury due to an underlying chronic condition.         CBC Auto Differential [[id number]]  (Abnormal) Collected: 04/25/24 2039    Specimen: Blood from Arm, Left Updated: 04/25/24 2047     WBC 14.32 10*3/mm3      RBC 5.31 10*6/mm3      Hemoglobin 15.2 g/dL      Hematocrit 45.6 %      MCV 85.9 fL      MCH 28.6 pg      MCHC 33.3 g/dL      RDW 13.6 %      RDW-SD 42.5 fl      MPV 11.3 fL      Platelets 257 10*3/mm3      Neutrophil % 74.0 %      Lymphocyte % 16.3 %      Monocyte % 8.7 %      Eosinophil % 0.1 %      Basophil % 0.3 %      Immature Grans % 0.6 %      Neutrophils, Absolute 10.61 10*3/mm3      Lymphocytes, Absolute 2.33 10*3/mm3      Monocytes, Absolute 1.25 10*3/mm3      Eosinophils, Absolute 0.01 10*3/mm3      Basophils, Absolute 0.04 10*3/mm3      Immature Grans, Absolute 0.08 10*3/mm3      nRBC 0.0 /100 WBC     Acetone [[id number]]  (Abnormal) Collected: 04/25/24 2039    Specimen: Blood from Arm, Left Updated: 04/25/24 2130     Acetone Small    Ethanol [[id number]] Collected: 04/25/24 2039    Specimen: Blood from Arm, Left Updated: 04/25/24 2226     Ethanol <10 mg/dL      Ethanol % <0.010 %     Narrative:      Ethanol (Plasma)  <10 Essentially Negative    Toxic Concentrations           mg/dL    Flushing, slowing of reflexes    Impaired visual activity         Depression of CNS              >100  Possible Coma                  >300       Salicylate Level [[id number]]  (Normal) Collected: 04/25/24 2039    Specimen: Blood from Arm, Left Updated: 04/25/24 2226     Salicylate <0.3 mg/dL     Osmolality, Serum [[id number]]  (Abnormal) Collected: 04/25/24 2039    Specimen: Blood from Arm, Left Updated: 04/26/24 0342      Osmolality 319 mOsm/kg     pH, Venous [[id number]]  (Abnormal) Collected: 04/25/24 2133    Specimen: Blood Updated: 04/25/24 2144     pH, Venous 7.168 pH Units     Urinalysis With Culture If Indicated - Urine, Clean Catch [[id number]]  (Abnormal) Collected: 04/25/24 2212    Specimen: Urine, Clean Catch Updated: 04/25/24 2225     Color, UA Yellow     Appearance, UA Clear     pH, UA <=5.0     Specific Gravity, UA 1.029     Glucose, UA >=1000 mg/dL (3+)     Ketones, UA >=160 mg/dL (4+)     Bilirubin, UA Negative     Blood, UA Small (1+)     Protein,  mg/dL (2+)     Leuk Esterase, UA Negative     Nitrite, UA Negative     Urobilinogen, UA 0.2 E.U./dL    Narrative:      In absence of clinical symptoms, the presence of pyuria, bacteria, and/or nitrites on the urinalysis result does not correlate with infection.    Blood Culture - Blood, Arm, Right [[id number]] Collected: 04/25/24 2212    Specimen: Blood from Arm, Right Updated: 04/25/24 2221    Urinalysis, Microscopic Only - Urine, Clean Catch [[id number]]  (Abnormal) Collected: 04/25/24 2212    Specimen: Urine, Clean Catch Updated: 04/25/24 2235     RBC, UA 0-2 /HPF      WBC, UA 0-2 /HPF      Comment: Urine culture not indicated.        Bacteria, UA None Seen /HPF      Squamous Epithelial Cells, UA 3-6 /HPF      Hyaline Casts, UA 0-2 /LPF      Methodology Manual Light Microscopy    Blood Culture - Blood, Hand, Right [[id number]] Collected: 04/25/24 2241    Specimen: Blood from Hand, Right Updated: 04/25/24 2243    Lactic Acid, Plasma [[id number]]  (Normal) Collected: 04/25/24 2241    Specimen: Blood from Hand, Right Updated: 04/25/24 2313     Lactate 1.5 mmol/L     POC Glucose STAT [[id number]]  (Abnormal) Collected: 04/26/24 0056    Specimen: Blood Updated: 04/26/24 0058     Glucose 338 mg/dL      Comment: Serial Number: [id number]:  [id number]       POC Glucose Once [[id number]]  (Abnormal) Collected: 04/26/24 0132    Specimen: Blood Updated: 04/26/24 0133      Glucose 327 mg/dL      Comment: Serial Number: [id number]:  [id number]       CBC & Differential [[id number]]  (Abnormal) Collected: 04/26/24 0152    Specimen: Blood Updated: 04/26/24 0158    Narrative:      The following orders were created for panel order CBC & Differential.  Procedure                               Abnormality         Status                     ---------                               -----------         ------                     CBC Auto Differential[[id number]]        Abnormal            Final result                 Please view results for these tests on the individual orders.    Comprehensive Metabolic Panel [[id number]]  (Abnormal) Collected: 04/26/24 0152    Specimen: Blood Updated: 04/26/24 0306     Glucose 284 mg/dL      BUN 6 mg/dL      Creatinine 1.17 mg/dL      Sodium 135 mmol/L      Potassium 5.2 mmol/L      Chloride 100 mmol/L      CO2 5.1 mmol/L      Calcium 9.4 mg/dL      Total Protein 9.4 g/dL      Albumin 4.3 g/dL      ALT (SGPT) 23 U/L      AST (SGOT) 22 U/L      Alkaline Phosphatase 128 U/L      Total Bilirubin 0.2 mg/dL      Globulin 5.1 gm/dL      A/G Ratio 0.8 g/dL      BUN/Creatinine Ratio 5.1     Anion Gap 29.9 mmol/L      eGFR 74.1 mL/min/1.73     Narrative:      GFR Normal >60  Chronic Kidney Disease <60  Kidney Failure <15      Phosphorus [[id number]]  (Normal) Collected: 04/26/24 0152    Specimen: Blood Updated: 04/26/24 0237     Phosphorus 3.1 mg/dL     Magnesium [[id number]]  (Normal) Collected: 04/26/24 0152    Specimen: Blood Updated: 04/26/24 0306     Magnesium 2.0 mg/dL     Osmolality, Serum [[id number]]  (Abnormal) Collected: 04/26/24 0152    Specimen: Blood from Hand, Left Updated: 04/26/24 0307     Osmolality 318 mOsm/kg     Hemoglobin A1c [[id number]] Collected: 04/26/24 0152    Specimen: Blood Updated: 04/26/24 0155    CBC Auto Differential [[id number]]  (Abnormal) Collected: 04/26/24 0152    Specimen: Blood Updated: 04/26/24 0158     WBC 15.83 10*3/mm3      RBC  5.36 10*6/mm3      Hemoglobin 15.2 g/dL      Hematocrit 46.7 %      MCV 87.1 fL      MCH 28.4 pg      MCHC 32.5 g/dL      RDW 14.0 %      RDW-SD 44.0 fl      MPV 11.4 fL      Platelets 235 10*3/mm3      Neutrophil % 75.1 %      Lymphocyte % 16.0 %      Monocyte % 8.2 %      Eosinophil % 0.0 %      Basophil % 0.3 %      Immature Grans % 0.4 %      Neutrophils, Absolute 11.89 10*3/mm3      Lymphocytes, Absolute 2.53 10*3/mm3      Monocytes, Absolute 1.30 10*3/mm3      Eosinophils, Absolute 0.00 10*3/mm3      Basophils, Absolute 0.04 10*3/mm3      Immature Grans, Absolute 0.07 10*3/mm3      nRBC 0.0 /100 WBC     Hemoglobin A1c [[id number]] Collected: 04/26/24 0152    Specimen: Blood Updated: 04/26/24 0336    POC Glucose Once [[id number]]  (Abnormal) Collected: 04/26/24 0244    Specimen: Blood Updated: 04/26/24 0245     Glucose 324 mg/dL      Comment: Serial Number: [id number]:  [id number]       Magnesium [[id number]]  (Normal) Collected: 04/26/24 0405    Specimen: Blood Updated: 04/26/24 0434     Magnesium 2.1 mg/dL     Phosphorus [[id number]]  (Normal) Collected: 04/26/24 0405    Specimen: Blood Updated: 04/26/24 0434     Phosphorus 2.6 mg/dL     Comprehensive Metabolic Panel [[id number]]  (Abnormal) Collected: 04/26/24 0405    Specimen: Blood Updated: 04/26/24 0444     Glucose 243 mg/dL      BUN 5 mg/dL      Creatinine 1.21 mg/dL      Sodium 136 mmol/L      Potassium 4.5 mmol/L      Chloride 102 mmol/L      CO2 6.8 mmol/L      Calcium 9.2 mg/dL      Total Protein 8.8 g/dL      Albumin 4.2 g/dL      ALT (SGPT) 22 U/L      AST (SGOT) 19 U/L      Alkaline Phosphatase 121 U/L      Total Bilirubin 0.2 mg/dL      Globulin 4.6 gm/dL      A/G Ratio 0.9 g/dL      BUN/Creatinine Ratio 4.1     Anion Gap 27.2 mmol/L      eGFR 71.2 mL/min/1.73     Narrative:      GFR Normal >60  Chronic Kidney Disease <60  Kidney Failure <15      Phosphorus [[id number]] Collected: 04/26/24 0405    Specimen: Blood from Hand, Left Updated: 04/26/24  0405    Magnesium [[id number]] Collected: 04/26/24 0405    Specimen: Blood from Hand, Left Updated: 04/26/24 0405    POC Glucose Once [[id number]]  (Abnormal) Collected: 04/26/24 0410    Specimen: Blood Updated: 04/26/24 0411     Glucose 238 mg/dL      Comment: Serial Number: [id number]:  [id number]       POC Glucose Once [[id number]]  (Abnormal) Collected: 04/26/24 0512    Specimen: Blood Updated: 04/26/24 0513     Glucose 279 mg/dL      Comment: Serial Number: [id number]:  [id number]       POC Glucose Once [[id number]]  (Abnormal) Collected: 04/26/24 0623    Specimen: Blood Updated: 04/26/24 0624     Glucose 260 mg/dL      Comment: Serial Number: [id number]:  [id number]       Basic Metabolic Panel [[id number]] Collected: 04/26/24 0742    Specimen: Blood from Hand, Right Updated: 04/26/24 0746    Magnesium [[id number]] Collected: 04/26/24 0742    Specimen: Blood from Hand, Right Updated: 04/26/24 0746    Phosphorus [[id number]] Collected: 04/26/24 0742    Specimen: Blood from Hand, Right Updated: 04/26/24 0746             Imaging:    CT Abdomen Pelvis With Contrast    Result Date: 4/26/2024  CT ABDOMEN PELVIS W CONTRAST-  Date of Exam: 4/25/2024 11:42 PM  Indication: 53-year-old male w/ h/o abdominal pain, not otherwise specified; h/o indigestion; constipation; vomiting; nausea.  Comparison: 6/21/2013.  Technique: Axial CT images were obtained of the abdomen and pelvis following the uneventful intravenous administration of 100 mL of Isovue-370 contrast agent. Reconstructed 2D coronal and sagittal images were also obtained. Automated exposure control and iterative construction methods were used. Motion artifact obscures detail on the study. No oral contrast agent was administered for the exam.  Findings: This study is motion-limited. There is a small hiatal hernia. Thickening of the wall of the hiatal hernia cannot be excluded. Gastroesophagitis is possible. No gastric emphysema. No gastric outlet  obstruction.  No acute infiltrate is seen in the partially imaged lung bases. No cardiac enlargement. No pleural effusion.  No gallstones. No acute cholecystitis. No acute pancreatitis. Please correlate with pertinent lab values. There may be diffuse hepatic steatosis with hepatomegaly. No splenomegaly. Chronic calcified granulomatous disease involves the abdomen.  No mechanical bowel obstruction. No acute colitis, diverticulitis, or appendicitis. No gross evidence of pneumoperitoneum or pneumatosis. No portal or mesenteric venous gas.  No hydronephrosis. No nephrolithiasis or ureterolithiasis. No definite acute pyelonephritis.  The urinary bladder is distended. There is mild prostamegaly. No urinary bladder calculi are seen. No urinary bladder wall thickening. There are pelvic phleboliths.  There is a small fat-containing umbilical hernia. It does not contain bowel. There are small bilateral inguinal hernias. They do not contain bowel.  Degenerative changes involve the imaged spine, especially at L5-S1, with disc and endplate degenerative changes. Degenerative changes involve the bilateral sacroiliac (SI) joints. There may be pubic osteitis. Mild degenerative changes involve the bilateral hip joints.       There is a small hiatal hernia with circumferential mural thickening. Age-indeterminate gastroesophagitis cannot be excluded. Consider imaging follow-up to ensure a benign progression. Consider EGD follow-up.  Motion artifact obscures detail on the exam.  There is nonspecific distention of the urinary bladder. There may be mild prostamegaly. Please correlate with pertinent lab values.  Otherwise, no acute findings are seen.  Please see above comments for further detail.     Please note that portions of this note were completed with a voice recognition program.    Electronically Signed By-Tushar Muro MD On:4/26/2024 12:54 AM      XR Chest 1 View    Result Date: 4/25/2024  XR CHEST 1 VW-  Date of Exam: 4/25/2024  8:45 PM  Indication: SOA Triage Protocol  Comparison: 4/5/2023  Findings: The heart is normal in size. The lungs are well-expanded and free of infiltrates.  Bony structures appear intact.      Impression: No active disease is seen.   Electronically Signed By-PETR CHRISTIE MD On:4/25/2024 9:20 PM         Differential Diagnosis and Discussion:    Metabolic: Differential diagnosis includes but is not limited to hypertension, hyperglycemia, hyperkalemia, hypocalcemia, metabolic acidosis, hypokalemia, hypoglycemia, malnutrition, hypothyroidism, hyperthyroidism, and adrenal insufficiency.   Vomiting: Differential diagnosis includes but is not limited to migraine, labyrinthine disorders, psychogenic, metabolic and endocrine causes, peptic ulcer, gastric outlet obstruction, gastritis, gastroenteritis, appendicitis, intestinal obstruction, paralytic ileus, food poisoning, cholecystitis, acute hepatitis, acute pancreatitis, acute febrile illness, and myocardial infarction.    All labs were reviewed and interpreted by me.  CT scan radiology impression was interpreted by me.    MDM     Amount and/or Complexity of Data Reviewed  Decide to obtain previous medical records or to obtain history from someone other than the patient: yes         Critical Care Note: Total Critical Care time of 35 minutes. Total critical care time documented does not include time spent on separately billed procedures for services of nurses or physician assistants. I personally saw and examined the patient. I have reviewed all diagnostic interpretations and treatment plans as written. I was present for the key portions of any procedures performed and the inclusive time noted in any critical care statement. Critical care time includes patient management by me, time spent at the patients bedside,  time to review lab and imaging results, discussing patient care, documentation in the medical record, and time spent with family or caregiver.    Patient was  placed on the cardiac monitor after given IV insulin infusion.  They were monitored for ventricular ectopy, arrhythmia, tachycardia, hypoxia, and changes in blood pressure.  Patient was rechecked several times throughout their stay.        Patient Care Considerations:    SEPSIS was considered but is NOT present in the emergency department as SIRS criteria is not present.      Consultants/Shared Management Plan:    Hospitalist: I have discussed the case with the hospitalist who agrees to accept the patient for admission.    Social Determinants of Health:    Patient is independent, reliable, and has access to care.       Disposition and Care Coordination:    Admit:   Through independent evaluation of the patient's history, physical, and imperical data, the patient meets criteria for inpatient admission to the hospital.        Final diagnoses:   Diabetic ketoacidosis without coma associated with type 2 diabetes mellitus   Nausea and vomiting, unspecified vomiting type        ED Disposition       ED Disposition   Decision to Admit    Condition   --    Comment   Level of Care: Critical Care [6]   Diagnosis: DKA (diabetic ketoacidosis) [436261]   Admitting Physician: BEROINCA SAPP [[id number]]   Certification: I Certify That Inpatient Hospital Services Are Medically Necessary For Greater Than 2 Midnights                 This medical record created using voice recognition software.             Navarro Damico MD  04/26/24 0749

## 2024-04-26 NOTE — CONSULTS
Pulmonary / Critical Care Consult Note      Patient Name: Tushar Castillo  : 1970  MRN: [id number]  Primary Care Physician:  Lashell Quinn APRN  Referring Physician: Jamei Pena MD  Date of admission: 2024    Subjective   Subjective     Reason for Consult/ Chief Complaint:   DKA, new diabetes diagnosis    HPI:  Tushar Castillo is a 53 y.o. male with a history of atrial fibrillation on flecainide and Pradaxa, sleep apnea, hypertension and prediabetes came in with 1 week of abdominal pain, nausea, vomiting and feeling poorly.  Patient reports polyuria and then developed severe abdominal pain over the last few days.  The patient was found to be hyperglycemic and had a profound metabolic acidosis.  He also notes bad breath over the last few days.  He tells me he is prediabetic.  Blood sugars over 300 here and he has a very significant metabolic acidosis.  Serum acetone was positive.  Also appears to be dehydrated with multiple electrolyte disturbances.  Patient received IV fluids and was started on insulin drip per DKA protocol.      Personal History     Past Medical History:   Diagnosis Date    Acute medial meniscus tear of left knee 02/15/2018    Arthritis     Atrial fibrillation     Diabetes mellitus     Hyperlipemia     Hypertension     Limb swelling     Primary osteoarthritis of left knee 2018    Seasonal allergies     Sleep apnea        Past Surgical History:   Procedure Laterality Date    COLONOSCOPY  2013    INTRAOCULAR LENS INSERTION      LASIK         Family History: family history includes Arthritis in his mother; Cancer in his father; Diabetes in his mother. Otherwise pertinent FHx was reviewed and not pertinent to current issue.    Social History:  reports that he has never smoked. He has never used smokeless tobacco. He reports current alcohol use. He reports that he does not use drugs.    Home Medications:  amLODIPine-benazepril, dabigatran etexilate, ergocalciferol,  fexofenadine, flecainide, hydrALAZINE, hydroCHLOROthiazide, metoprolol succinate XL, mirtazapine, multivitamin with minerals, and rosuvastatin    Allergies:  Allergies   Allergen Reactions    Compazine [Prochlorperazine] Paresthesia     Jaws       Objective    Objective     Vitals:   Temp:  [97.6 °F (36.4 °C)-98.6 °F (37 °C)] 98.6 °F (37 °C)  Heart Rate:  [] 94  Resp:  [18-24] 18  BP: (135-161)/(79-98) 145/80    Physical Exam:  Vital Signs Reviewed   General:  WDWN, Alert, NAD.    HEENT:  PERRL, EOMI.  OP, nares clear  Neck:  Supple, no JVD, no thyromegaly  Chest:  good aeration, clear to auscultation bilaterally, tympanic to percussion bilaterally, no work of breathing noted  CV: Sinus tachycardia, no MGR, pulses 2+, equal.  Abd:  Soft, NT, ND, + BS, no HSM, obese  EXT:  no clubbing, no cyanosis, no edema  Neuro:  A&Ox3, CN grossly intact, no focal deficits.  Skin: No rashes or lesions noted      Result Review    Result Review:  I have personally reviewed the results from the time of this admission to 4/26/2024 08:50 EDT and agree with these findings:  [x]  Laboratory  []  Microbiology  [x]  Radiology  [x]  EKG/Telemetry   [x]  Cardiology/Vascular   []  Pathology  []  Old records  []  Other:  Most notable findings include:       Lab 04/26/24  0742 04/26/24  0405 04/26/24  0152 04/25/24 2039   WBC  --   --  15.83* 14.32*   HEMOGLOBIN  --   --  15.2 15.2   HEMATOCRIT  --   --  46.7 45.6   PLATELETS  --   --  235 257   SODIUM 137 136 135* 135*   POTASSIUM 4.4 4.5 5.2 4.8   CHLORIDE 103 102 100 92*   CO2 9.2* 6.8* 5.1* 7.8*   BUN 5* 5* 6 6   CREATININE 1.14 1.21 1.17 1.48*   GLUCOSE 259* 243* 284* 363*   CALCIUM 8.9 9.2 9.4 10.2   PHOSPHORUS 2.0* 2.6 3.1  --    TOTAL PROTEIN  --  8.8* 9.4* 9.9*   ALBUMIN  --  4.2 4.3 4.8   GLOBULIN  --  4.6 5.1 5.1     Serum acetone positive  ABG 7.27/19/100/8    Chest x-ray personally read showing clear lung fields    CT Abdomen Pelvis With Contrast    Result Date:  4/26/2024  CT ABDOMEN PELVIS W CONTRAST-  Date of Exam: 4/25/2024 11:42 PM  Indication: 53-year-old male w/ h/o abdominal pain, not otherwise specified; h/o indigestion; constipation; vomiting; nausea.  Comparison: 6/21/2013.  Technique: Axial CT images were obtained of the abdomen and pelvis following the uneventful intravenous administration of 100 mL of Isovue-370 contrast agent. Reconstructed 2D coronal and sagittal images were also obtained. Automated exposure control and iterative construction methods were used. Motion artifact obscures detail on the study. No oral contrast agent was administered for the exam.  Findings: This study is motion-limited. There is a small hiatal hernia. Thickening of the wall of the hiatal hernia cannot be excluded. Gastroesophagitis is possible. No gastric emphysema. No gastric outlet obstruction.  No acute infiltrate is seen in the partially imaged lung bases. No cardiac enlargement. No pleural effusion.  No gallstones. No acute cholecystitis. No acute pancreatitis. Please correlate with pertinent lab values. There may be diffuse hepatic steatosis with hepatomegaly. No splenomegaly. Chronic calcified granulomatous disease involves the abdomen.  No mechanical bowel obstruction. No acute colitis, diverticulitis, or appendicitis. No gross evidence of pneumoperitoneum or pneumatosis. No portal or mesenteric venous gas.  No hydronephrosis. No nephrolithiasis or ureterolithiasis. No definite acute pyelonephritis.  The urinary bladder is distended. There is mild prostamegaly. No urinary bladder calculi are seen. No urinary bladder wall thickening. There are pelvic phleboliths.  There is a small fat-containing umbilical hernia. It does not contain bowel. There are small bilateral inguinal hernias. They do not contain bowel.  Degenerative changes involve the imaged spine, especially at L5-S1, with disc and endplate degenerative changes. Degenerative changes involve the bilateral  sacroiliac (SI) joints. There may be pubic osteitis. Mild degenerative changes involve the bilateral hip joints.      Impression:  There is a small hiatal hernia with circumferential mural thickening. Age-indeterminate gastroesophagitis cannot be excluded. Consider imaging follow-up to ensure a benign progression. Consider EGD follow-up.  Motion artifact obscures detail on the exam.  There is nonspecific distention of the urinary bladder. There may be mild prostamegaly. Please correlate with pertinent lab values.  Otherwise, no acute findings are seen.  Please see above comments for further detail.     Please note that portions of this note were completed with a voice recognition program.    Electronically Signed By-Tushar Muro MD On:4/26/2024 12:54 AM       Assessment & Plan   Assessment / Plan     Active Hospital Problems:  Active Hospital Problems    Diagnosis     **DKA (diabetic ketoacidosis)     Nausea and vomiting     Paroxysmal atrial fibrillation     NICOLA (obstructive sleep apnea)     Essential hypertension      Impression:  Type II diabetic ketoacidosis without coma  Type 2 diabetes with hyperglycemia.  New diagnosis  Dehydration  Hypophosphatemia  Pseudohyponatremia  Acute kidney injury secondary to prerenal etiology  Atrial fibrillation on flecainide and Pradaxa  Obstructive sleep apnea  History of essential hypertension  Class II obesity with BMI greater than 35    Plan:  Continue insulin drip per DKA protocol until anion gap closes  N.p.o. for now  Bolus an additional 2 L of lactated Ringer's and continue IV fluids per DKA protocol  Hemoglobin A1c pending.  Discussed with patient that he likely has type 2 diabetes.  Will need to go home on home diabetic regimen once stable  Appreciate diabetic educator input  Trend renal panel and electrolytes.  Replace phosphorus IV  Still has quite a large anion gap this morning so we will continue to need ongoing volume resuscitation and insulin drip    DVT  prophylaxis:  Medical DVT prophylaxis orders are present.    There are no questions and answers to display.        The patient is critically ill in the ICU with type II DKA without coma, new diagnosis of type 2 diabetes, acute kidney injury, dehydration, multiple electrolyte disturbances. Multidisciplinary bedside critical care rounds were performed with nursing staff, respiratory therapy, pharmacy, nutritional services, social work. I have personally reviewed the chart, labs and any pertinent imaging available.  I have spent 33 minutes of critical care time, excluding procedures, in the care of this patient.    Electronically signed by Matheus Hicks MD, 04/26/24, 11:46 AM EDT.

## 2024-04-26 NOTE — H&P
[facility]   HISTORY AND PHYSICAL    Patient Name: Tushar Castillo  : 1970  MRN: [id number]  Primary Care Physician:  Lashell Quinn APRN  Date of admission: 2024    Subjective   Subjective     Chief Complaint: Abdominal Pain, Nausea     HPI:    Tushar Castillo is a 53 y.o. male with PMHx of HTN, A-fib on Pradaxa, NICOLA, and GERD presented to the ED with complaints of abdominal pain, nausea and vomiting. Patient states that for the last two weeks he has been having intermittent abdominal pain with nausea, occasional vomiting, polydipsea and polyurea.  He does follow up regularly with his PCP but states that he has never has been diagnosed with diabetes.  Due to his worsening symptoms he came to the ED for further evaluation. In the ED patient was tachycardic and hypertensive on arrival with remaining vitals being within normal limits.  Labs showed hyperglycemia with an anion gap metabolic acidosis with positive serum acetone.  CT of the abdomen only showed non specific findings. When asked he denied any recent fevers, chills, headaches, focal weakness, chest pain, palpitation, shortness of breath, cough, diarrhea, constipation, dysuria, hematuria, hematochezia, melena, or anxiety. Patient admitted to the ICU for management of his DKA.    Review of Systems   All systems were reviewed and negative except for: as per HIP    Personal History     Past Medical History:   Diagnosis Date    Acute medial meniscus tear of left knee 02/15/2018    Arthritis     Atrial fibrillation     Diabetes mellitus     Hyperlipemia     Hypertension     Limb swelling     Primary osteoarthritis of left knee 2018    Seasonal allergies     Sleep apnea        Past Surgical History:   Procedure Laterality Date    COLONOSCOPY      INTRAOCULAR LENS INSERTION      LASIK         Family History: family history includes Arthritis in his mother; Cancer in his father; Diabetes in his mother. Otherwise pertinent FHx was  reviewed and not pertinent to current issue.    Social History:  reports that he has never smoked. He has never used smokeless tobacco. He reports current alcohol use. He reports that he does not use drugs.    Home Medications:  amLODIPine-benazepril, dabigatran etexilate, ergocalciferol, fexofenadine, flecainide, hydrALAZINE, hydroCHLOROthiazide, metoprolol succinate XL, mirtazapine, multivitamin with minerals, and rosuvastatin      Allergies:  Allergies   Allergen Reactions    Compazine [Prochlorperazine] Paresthesia     Jaws       Objective   Objective     Vitals:   Temp:  [97.6 °F (36.4 °C)-98.5 °F (36.9 °C)] 98.5 °F (36.9 °C)  Heart Rate:  [] 116  Resp:  [18-24] 18  BP: (140-161)/(79-98) 140/86  Physical Exam    Constitutional: Awake, alert   Eyes: PERRLA, sclerae anicteric, no conjunctival injection   HENT: NCAT, mucous membranes moist   Neck: Supple, no thyromegaly, no lymphadenopathy, trachea midline   Respiratory: Clear to auscultation bilaterally, nonlabored respirations    Cardiovascular: RRR, no murmurs, rubs, or gallops, palpable pedal pulses bilaterally   Gastrointestinal: Positive bowel sounds, soft, nontender, nondistended   Musculoskeletal: No bilateral ankle edema, no clubbing or cyanosis to extremities   Psychiatric: Appropriate affect, cooperative   Neurologic: Oriented x 3, strength symmetric in all extremities, Cranial Nerves grossly intact to confrontation, speech clear   Skin: No rashes     Result Review    Result Review:  I have personally reviewed the results from the time of this admission to 4/26/2024 04:45 EDT and agree with these findings:  [x]  Laboratory list / accordion  []  Microbiology  [x]  Radiology  [x]  EKG/Telemetry   []  Cardiology/Vascular   []  Pathology  []  Old records  []  Other:  Most notable findings include:  hyperglycemia with an anion gap metabolic acidosis with positive serum acetone.  CT of the abdomen only showed non specific findings      Assessment & Plan    Assessment / Plan     Brief Patient Summary:  Tushar Castillo is a 53 y.o. male with PMHx of HTN, A-fib on Pradaxa, NICOLA, and GERD presented to the ED with complaints of abdominal pain, nausea and vomiting.    Active Hospital Problems:  Active Hospital Problems    Diagnosis     **DKA (diabetic ketoacidosis)     Nausea and vomiting     Paroxysmal atrial fibrillation     NICOLA (obstructive sleep apnea)     Essential hypertension      Plan:     Diabetic ketoacidosis  -Admit to intensive care unit  -Patient with prior Dx of DM  -Patient with metabolic acidosis  -Anion gap of 27 on arrival  -Antiemetics as needed  -Pain meds as needed  -Aggressive IVF  -Electrolyte replacement protocol  -Insulin drip  -Serial labs  -A1C  -Follow-up ABG  -Intensivist consulted  -Home regimen upon discharge  -Supportive care    HTN  -Currently well controlled  -PRN BP meds  -Resume home meds when available  -Titrate if needed    A-fib  -Resume home Pradaxa    NICOLA  -BiPAP    GI ppx        CODE STATUS:  Full Code     Admission Status:  I believe this patient meets Inpatient status.      Electronically signed by Dominic Adair MD, 04/26/24, 4:45 AM EDT.

## 2024-04-26 NOTE — CONSULTS
"Met with patient at bedside. Discussed patient's most recent A1c of 11.1% with an estimated average glucose of 272 mg/dL, with our goal of an A1c less than 7%. Pending current A1c results.     Educated on long term side effects and health complications from elevated blood sugars. Discussed how these are preventable if we can keep our blood sugars in a normal range.     Patient states that his provided had mentioned that he had diabetes \"about a month or a month and a half ago\" but never followed up. Patient open to learning. Discussed checking blood sugar daily, administering basal insulin, and taking PO medications.     Will return later today to practice insulin injections and provide handouts and book for further diabetes education. No questions at this time.  "

## 2024-04-27 LAB
ANION GAP SERPL CALCULATED.3IONS-SCNC: 12.4 MMOL/L (ref 5–15)
ANION GAP SERPL CALCULATED.3IONS-SCNC: 12.8 MMOL/L (ref 5–15)
ANION GAP SERPL CALCULATED.3IONS-SCNC: 13.7 MMOL/L (ref 5–15)
BASOPHILS # BLD AUTO: 0.04 10*3/MM3 (ref 0–0.2)
BASOPHILS NFR BLD AUTO: 0.4 % (ref 0–1.5)
BUN SERPL-MCNC: 3 MG/DL (ref 6–20)
BUN/CREAT SERPL: 3.1 (ref 7–25)
BUN/CREAT SERPL: 3.4 (ref 7–25)
BUN/CREAT SERPL: 3.4 (ref 7–25)
CALCIUM SPEC-SCNC: 8.4 MG/DL (ref 8.6–10.5)
CALCIUM SPEC-SCNC: 8.8 MG/DL (ref 8.6–10.5)
CALCIUM SPEC-SCNC: 8.8 MG/DL (ref 8.6–10.5)
CHLORIDE SERPL-SCNC: 105 MMOL/L (ref 98–107)
CHLORIDE SERPL-SCNC: 106 MMOL/L (ref 98–107)
CHLORIDE SERPL-SCNC: 107 MMOL/L (ref 98–107)
CO2 SERPL-SCNC: 16.3 MMOL/L (ref 22–29)
CO2 SERPL-SCNC: 16.6 MMOL/L (ref 22–29)
CO2 SERPL-SCNC: 17.2 MMOL/L (ref 22–29)
CREAT SERPL-MCNC: 0.87 MG/DL (ref 0.76–1.27)
CREAT SERPL-MCNC: 0.88 MG/DL (ref 0.76–1.27)
CREAT SERPL-MCNC: 0.96 MG/DL (ref 0.76–1.27)
DEPRECATED RDW RBC AUTO: 41.7 FL (ref 37–54)
EGFRCR SERPLBLD CKD-EPI 2021: 102.2 ML/MIN/1.73
EGFRCR SERPLBLD CKD-EPI 2021: 102.5 ML/MIN/1.73
EGFRCR SERPLBLD CKD-EPI 2021: 93.9 ML/MIN/1.73
EOSINOPHIL # BLD AUTO: 0.22 10*3/MM3 (ref 0–0.4)
EOSINOPHIL NFR BLD AUTO: 2.3 % (ref 0.3–6.2)
ERYTHROCYTE [DISTWIDTH] IN BLOOD BY AUTOMATED COUNT: 13.6 % (ref 12.3–15.4)
GLUCOSE BLDC GLUCOMTR-MCNC: 102 MG/DL (ref 70–99)
GLUCOSE BLDC GLUCOMTR-MCNC: 106 MG/DL (ref 70–99)
GLUCOSE BLDC GLUCOMTR-MCNC: 107 MG/DL (ref 70–99)
GLUCOSE BLDC GLUCOMTR-MCNC: 111 MG/DL (ref 70–99)
GLUCOSE BLDC GLUCOMTR-MCNC: 120 MG/DL (ref 70–99)
GLUCOSE BLDC GLUCOMTR-MCNC: 126 MG/DL (ref 70–99)
GLUCOSE BLDC GLUCOMTR-MCNC: 132 MG/DL (ref 70–99)
GLUCOSE BLDC GLUCOMTR-MCNC: 132 MG/DL (ref 70–99)
GLUCOSE BLDC GLUCOMTR-MCNC: 140 MG/DL (ref 70–99)
GLUCOSE BLDC GLUCOMTR-MCNC: 143 MG/DL (ref 70–99)
GLUCOSE BLDC GLUCOMTR-MCNC: 152 MG/DL (ref 70–99)
GLUCOSE BLDC GLUCOMTR-MCNC: 156 MG/DL (ref 70–99)
GLUCOSE BLDC GLUCOMTR-MCNC: 167 MG/DL (ref 70–99)
GLUCOSE BLDC GLUCOMTR-MCNC: 263 MG/DL (ref 70–99)
GLUCOSE SERPL-MCNC: 106 MG/DL (ref 65–99)
GLUCOSE SERPL-MCNC: 147 MG/DL (ref 65–99)
GLUCOSE SERPL-MCNC: 160 MG/DL (ref 65–99)
HCT VFR BLD AUTO: 35.3 % (ref 37.5–51)
HGB BLD-MCNC: 12 G/DL (ref 13–17.7)
IMM GRANULOCYTES # BLD AUTO: 0.03 10*3/MM3 (ref 0–0.05)
IMM GRANULOCYTES NFR BLD AUTO: 0.3 % (ref 0–0.5)
LYMPHOCYTES # BLD AUTO: 3.57 10*3/MM3 (ref 0.7–3.1)
LYMPHOCYTES NFR BLD AUTO: 37.2 % (ref 19.6–45.3)
MAGNESIUM SERPL-MCNC: 1.6 MG/DL (ref 1.6–2.6)
MAGNESIUM SERPL-MCNC: 1.7 MG/DL (ref 1.6–2.6)
MAGNESIUM SERPL-MCNC: 1.8 MG/DL (ref 1.6–2.6)
MCH RBC QN AUTO: 28.6 PG (ref 26.6–33)
MCHC RBC AUTO-ENTMCNC: 34 G/DL (ref 31.5–35.7)
MCV RBC AUTO: 84 FL (ref 79–97)
MONOCYTES # BLD AUTO: 1.23 10*3/MM3 (ref 0.1–0.9)
MONOCYTES NFR BLD AUTO: 12.8 % (ref 5–12)
NEUTROPHILS NFR BLD AUTO: 4.51 10*3/MM3 (ref 1.7–7)
NEUTROPHILS NFR BLD AUTO: 47 % (ref 42.7–76)
NRBC BLD AUTO-RTO: 0 /100 WBC (ref 0–0.2)
PHOSPHATE SERPL-MCNC: 2 MG/DL (ref 2.5–4.5)
PHOSPHATE SERPL-MCNC: 2.8 MG/DL (ref 2.5–4.5)
PHOSPHATE SERPL-MCNC: 3.1 MG/DL (ref 2.5–4.5)
PLATELET # BLD AUTO: 172 10*3/MM3 (ref 140–450)
PMV BLD AUTO: 11.3 FL (ref 6–12)
POTASSIUM SERPL-SCNC: 3.3 MMOL/L (ref 3.5–5.2)
POTASSIUM SERPL-SCNC: 3.4 MMOL/L (ref 3.5–5.2)
POTASSIUM SERPL-SCNC: 3.6 MMOL/L (ref 3.5–5.2)
RBC # BLD AUTO: 4.2 10*6/MM3 (ref 4.14–5.8)
SODIUM SERPL-SCNC: 135 MMOL/L (ref 136–145)
SODIUM SERPL-SCNC: 136 MMOL/L (ref 136–145)
SODIUM SERPL-SCNC: 136 MMOL/L (ref 136–145)
WBC NRBC COR # BLD AUTO: 9.6 10*3/MM3 (ref 3.4–10.8)

## 2024-04-27 PROCEDURE — 63710000001 INSULIN LISPRO (HUMAN) PER 5 UNITS: Performed by: NURSE PRACTITIONER

## 2024-04-27 PROCEDURE — 83735 ASSAY OF MAGNESIUM: CPT | Performed by: EMERGENCY MEDICINE

## 2024-04-27 PROCEDURE — 63710000001 INSULIN DETEMIR PER 5 UNITS: Performed by: NURSE PRACTITIONER

## 2024-04-27 PROCEDURE — 84100 ASSAY OF PHOSPHORUS: CPT | Performed by: EMERGENCY MEDICINE

## 2024-04-27 PROCEDURE — 80048 BASIC METABOLIC PNL TOTAL CA: CPT | Performed by: EMERGENCY MEDICINE

## 2024-04-27 PROCEDURE — 25810000003 SODIUM CHLORIDE 0.9 % SOLUTION: Performed by: PHYSICIAN ASSISTANT

## 2024-04-27 PROCEDURE — 85025 COMPLETE CBC W/AUTO DIFF WBC: CPT | Performed by: INTERNAL MEDICINE

## 2024-04-27 PROCEDURE — 36415 COLL VENOUS BLD VENIPUNCTURE: CPT | Performed by: EMERGENCY MEDICINE

## 2024-04-27 PROCEDURE — 25010000002 MAGNESIUM SULFATE 2 GM/50ML SOLUTION: Performed by: NURSE PRACTITIONER

## 2024-04-27 PROCEDURE — 82948 REAGENT STRIP/BLOOD GLUCOSE: CPT | Performed by: NURSE PRACTITIONER

## 2024-04-27 PROCEDURE — 82948 REAGENT STRIP/BLOOD GLUCOSE: CPT

## 2024-04-27 PROCEDURE — 99291 CRITICAL CARE FIRST HOUR: CPT | Performed by: INTERNAL MEDICINE

## 2024-04-27 PROCEDURE — 99233 SBSQ HOSP IP/OBS HIGH 50: CPT | Performed by: INTERNAL MEDICINE

## 2024-04-27 PROCEDURE — 94799 UNLISTED PULMONARY SVC/PX: CPT

## 2024-04-27 RX ORDER — DEXTROSE MONOHYDRATE 25 G/50ML
25 INJECTION, SOLUTION INTRAVENOUS
Status: DISCONTINUED | OUTPATIENT
Start: 2024-04-27 | End: 2024-04-28 | Stop reason: HOSPADM

## 2024-04-27 RX ORDER — IBUPROFEN 600 MG/1
1 TABLET ORAL
Status: DISCONTINUED | OUTPATIENT
Start: 2024-04-27 | End: 2024-04-28 | Stop reason: HOSPADM

## 2024-04-27 RX ORDER — POTASSIUM CHLORIDE 750 MG/1
40 CAPSULE, EXTENDED RELEASE ORAL ONCE
Status: COMPLETED | OUTPATIENT
Start: 2024-04-27 | End: 2024-04-27

## 2024-04-27 RX ORDER — NICOTINE POLACRILEX 4 MG
15 LOZENGE BUCCAL
Status: DISCONTINUED | OUTPATIENT
Start: 2024-04-27 | End: 2024-04-28 | Stop reason: HOSPADM

## 2024-04-27 RX ORDER — INSULIN LISPRO 100 [IU]/ML
2-9 INJECTION, SOLUTION INTRAVENOUS; SUBCUTANEOUS
Status: DISCONTINUED | OUTPATIENT
Start: 2024-04-27 | End: 2024-04-28 | Stop reason: HOSPADM

## 2024-04-27 RX ORDER — NICOTINE POLACRILEX 4 MG
15 LOZENGE BUCCAL
Status: DISCONTINUED | OUTPATIENT
Start: 2024-04-27 | End: 2024-04-27

## 2024-04-27 RX ORDER — MAGNESIUM SULFATE HEPTAHYDRATE 40 MG/ML
2 INJECTION, SOLUTION INTRAVENOUS ONCE
Status: COMPLETED | OUTPATIENT
Start: 2024-04-27 | End: 2024-04-27

## 2024-04-27 RX ADMIN — FLECAINIDE ACETATE 50 MG: 50 TABLET ORAL at 22:00

## 2024-04-27 RX ADMIN — MAGNESIUM SULFATE HEPTAHYDRATE 2 G: 2 INJECTION, SOLUTION INTRAVENOUS at 10:05

## 2024-04-27 RX ADMIN — ACETAMINOPHEN 650 MG: 325 TABLET ORAL at 15:35

## 2024-04-27 RX ADMIN — FLECAINIDE ACETATE 50 MG: 50 TABLET ORAL at 10:06

## 2024-04-27 RX ADMIN — SENNOSIDES AND DOCUSATE SODIUM 2 TABLET: 50; 8.6 TABLET ORAL at 22:00

## 2024-04-27 RX ADMIN — INSULIN DETEMIR 35 UNITS: 100 INJECTION, SOLUTION SUBCUTANEOUS at 10:05

## 2024-04-27 RX ADMIN — ROSUVASTATIN 40 MG: 20 TABLET, FILM COATED ORAL at 10:05

## 2024-04-27 RX ADMIN — POTASSIUM & SODIUM PHOSPHATES POWDER PACK 280-160-250 MG 1 PACKET: 280-160-250 PACK at 22:00

## 2024-04-27 RX ADMIN — POTASSIUM CHLORIDE 40 MEQ: 750 CAPSULE, EXTENDED RELEASE ORAL at 11:20

## 2024-04-27 RX ADMIN — DABIGATRAN ETEXILATE MESYLATE 150 MG: 150 CAPSULE ORAL at 22:00

## 2024-04-27 RX ADMIN — Medication 10 ML: at 10:09

## 2024-04-27 RX ADMIN — METOPROLOL SUCCINATE 100 MG: 50 TABLET, EXTENDED RELEASE ORAL at 10:06

## 2024-04-27 RX ADMIN — INSULIN LISPRO 6 UNITS: 100 INJECTION, SOLUTION INTRAVENOUS; SUBCUTANEOUS at 22:00

## 2024-04-27 RX ADMIN — MIRTAZAPINE 30 MG: 15 TABLET, FILM COATED ORAL at 22:00

## 2024-04-27 RX ADMIN — Medication 10 ML: at 22:01

## 2024-04-27 RX ADMIN — DABIGATRAN ETEXILATE MESYLATE 150 MG: 150 CAPSULE ORAL at 10:05

## 2024-04-27 RX ADMIN — POTASSIUM & SODIUM PHOSPHATES POWDER PACK 280-160-250 MG 1 PACKET: 280-160-250 PACK at 10:05

## 2024-04-27 RX ADMIN — POTASSIUM & SODIUM PHOSPHATES POWDER PACK 280-160-250 MG 1 PACKET: 280-160-250 PACK at 17:50

## 2024-04-27 RX ADMIN — POTASSIUM PHOSPHATES 15 MMOL: 236; 224 INJECTION, SOLUTION INTRAVENOUS at 02:21

## 2024-04-27 NOTE — PLAN OF CARE
Goal Outcome Evaluation:           Progress: improving  Outcome Evaluation: patient was a transfer from ICU this shift. patient remains alert and oriented x4, on room air. patient complained of leg cramps upon arrival to unit, medicated prn per mar and took hot shower for relief. blood glucose monitored throughout shift. second set of eyes skin assessment perrformed with MALICK Greenberg. No skin issues found. No new issues at this time.

## 2024-04-27 NOTE — PLAN OF CARE
Problem: Adult Inpatient Plan of Care  Goal: Plan of Care Review  4/27/2024 0533 by Wendy Hays RN  Outcome: Ongoing, Progressing  Flowsheets (Taken 4/27/2024 0533)  Progress: improving  Plan of Care Reviewed With: patient  4/27/2024 0533 by Wendy Hays RN  Outcome: Ongoing, Progressing  Flowsheets (Taken 4/27/2024 0533)  Progress: improving  Plan of Care Reviewed With: patient  4/27/2024 0532 by Wendy Hays RN  Outcome: Ongoing, Progressing     Problem: Diabetic Ketoacidosis  Goal: Fluid and Electrolyte Balance with Absence of Ketosis  4/27/2024 0533 by Wendy Hays RN  Outcome: Ongoing, Progressing  4/27/2024 0532 by Wendy Hays RN  Outcome: Ongoing, Progressing   Goal Outcome Evaluation:  Plan of Care Reviewed With: patient        Progress: improving       VSS, up ab blossom, education provided

## 2024-04-27 NOTE — PROGRESS NOTES
[facility]   Hospitalist Progress Note    Date of admission: 4/25/2024  Patient Name: Tushar Castillo  1970  Date: 4/26/2024      Subjective     Chief Complaint   Patient presents with    Nausea    Vomiting    Shortness of Breath     Pt states he has been soa for 2 days, and has had n/v for the last 3 days       Interval Followup: Starting to feel better given initial fluid resuscitation.  Appetite still very minimal has had some clear liquids and doing okay with that.  No current nausea or vomiting.  No chest pain.      Objective     Vitals:   Temp:  [98 °F (36.7 °C)-98.6 °F (37 °C)] 98.5 °F (36.9 °C)  Heart Rate:  [] 78  Resp:  [18-19] 19  BP: (105-161)/(64-94) 126/74    Physical Exam  Awake conversant morbidly obese  Dry mucosa  CTAB no wheezing  Elevated rate regular rhythm no lower extremity pitting edema  Obese abdomen soft nontender nondistended  Moves extremities spontaneously  AOx3      Result Review:  Vital signs, labs and recent relevant imaging reviewed.      CBC          2/22/2024    14:57 4/25/2024    20:39 4/26/2024    01:52   CBC   WBC 10.67     14.32  15.83    RBC 5.11     5.31  5.36    Hemoglobin 14.8     15.2  15.2    Hematocrit 43.7     45.6  46.7    MCV 85.5     85.9  87.1    MCH 29.0     28.6  28.4    MCHC 33.9     33.3  32.5    RDW 12.6     13.6  14.0    Platelets 266     257  235       Details          This result is from an external source.             CMP          4/25/2024    20:39 4/26/2024    01:52 4/26/2024    04:05 4/26/2024    07:42 4/26/2024    11:55 4/26/2024    16:05 4/26/2024    19:53   CMP   Glucose 363  284  243  259  201  182  128    BUN 6  6  5  5  5  4  4    Creatinine 1.48  1.17  1.21  1.14  1.07  1.03  0.94    EGFR 55.9  74.1  71.2  76.4  82.5  86.3  96.3    Sodium 135  135  136  137  135  134  135    Potassium 4.8  5.2  4.5  4.4  3.7  3.2  3.5    Chloride 92  100  102  103  104  103  104    Calcium 10.2  9.4  9.2  8.9  8.7  9.3  9.0    Total Protein 9.9   9.4  8.8        Albumin 4.8  4.3  4.2        Globulin 5.1  5.1  4.6        Total Bilirubin 0.3  0.2  0.2        Alkaline Phosphatase 139  128  121        AST (SGOT) 25  22  19        ALT (SGPT) 25  23  22        Albumin/Globulin Ratio 0.9  0.8  0.9        BUN/Creatinine Ratio 4.1  5.1  4.1  4.4  4.7  3.9  4.3    Anion Gap 35.2  29.9  27.2  24.8  16.0  15.0  13.6          acetaminophen    aluminum-magnesium hydroxide-simethicone    senna-docusate sodium **AND** polyethylene glycol **AND** bisacodyl **AND** bisacodyl    Calcium Replacement - Follow Nurse / BPA Driven Protocol    dextrose    dextrose    dextrose 5 % and sodium chloride 0.45 %    dextrose 5 % and sodium chloride 0.45 % with KCl 20 mEq/L    dextrose 5 % and sodium chloride 0.45 % with KCl 40 mEq/L    dextrose 5 % and sodium chloride 0.9 %    dextrose 5 % and sodium chloride 0.9 % with KCl 20 mEq    dextrose 5% and sodium chloride 0.9% with KCl 40 mEq/L    Diclofenac Sodium    glucagon (human recombinant)    hydrOXYzine    Lidocaine    Magnesium Standard Dose Replacement - Follow Nurse / BPA Driven Protocol    melatonin    nicotine    nitroglycerin    ondansetron    Phosphorus Replacement - Follow Nurse / BPA Driven Protocol    Potassium Replacement - Follow Nurse / BPA Driven Protocol    prochlorperazine    sodium chloride 0.45 % 1,000 mL with potassium chloride 40 mEq infusion    sodium chloride    sodium chloride 0.45 % with KCl 20 mEq    sodium chloride    sodium chloride    sodium chloride    sodium chloride    sodium chloride    sodium chloride    sodium chloride    sodium chloride 0.9 % with KCl 20 mEq    sodium chloride 0.9 % with KCl 40 mEq/L    dabigatran etexilate, 150 mg, Oral, Q12H  flecainide, 50 mg, Oral, BID  [Held by provider] hydroCHLOROthiazide, 25 mg, Oral, Daily  metoprolol succinate XL, 100 mg, Oral, Daily  mirtazapine, 30 mg, Oral, Nightly  [START ON 4/27/2024] potassium & sodium phosphates, 1 packet, Oral, 4x Daily With Meals &  Nightly  potassium phosphate, 15 mmol, Intravenous, Q3H  rosuvastatin, 40 mg, Oral, Daily  senna-docusate sodium, 2 tablet, Oral, BID  sodium chloride, 10 mL, Intravenous, Q12H  sodium chloride, 10 mL, Intravenous, Q12H  sodium chloride, 10 mL, Intravenous, Q12H        CT Abdomen Pelvis With Contrast    Result Date: 4/26/2024   There is a small hiatal hernia with circumferential mural thickening. Age-indeterminate gastroesophagitis cannot be excluded. Consider imaging follow-up to ensure a benign progression. Consider EGD follow-up.  Motion artifact obscures detail on the exam.  There is nonspecific distention of the urinary bladder. There may be mild prostamegaly. Please correlate with pertinent lab values.  Otherwise, no acute findings are seen.  Please see above comments for further detail.     Please note that portions of this note were completed with a voice recognition program.    Electronically Signed By-Tushar Muro MD On:4/26/2024 12:54 AM      XR Chest 1 View    Result Date: 4/25/2024  Impression: No active disease is seen.   Electronically Signed By-PETR CHRISTIE MD On:4/25/2024 9:20 PM       Assessment / Plan     Summary: 49-year-old male with history of A-fib on Pradaxa, prediabetes, NICOLA on home CPAP who presented with nausea vomiting polyuria polydipsia found to have DKA.  Admitted to the ICU    Assessment/Plan (clinically significant if listed here)  DKA, type II without coma  DM2, previously apparently was prediabetic  PETER, prerenal secondary to above  A-fib on Pradaxa  Hypertension  Hypomagnesemia  Hypophosphatemia  Hypokalemia  Metabolic acidosis  Prostatomegaly.  CT imaging  Gastroesophagitis per CT imaging, outpatient EGD follow-up needed  Morbid obesity BMI 35  Depression    Continue insulin drip, DKA protocol, trend BMP mag Phos  Still with notable acidosis, gap improving,  Replace magnesium phosphorus potassium chronic several doses starting on oral as well  Clear liquid diet only for  now  Epigastric and flecainide metoprolol continued for A-fib  Continue statin  Continue mirtazapine, will recommend follow-up with PCP to discuss alternative antidepressant given patient's morbid obesity  Check PVR given prostatic megaly and elevated creatinine  No overt reflux symptoms, monitor, gastroesophagitis suggested by imaging, follow-up outpatient for EGD and can also do colonoscopy at that time if needed.  Placed on famotidine for now.  Maalox did have several episodes of nausea and vomiting in setting of his DKA  Hold home HCTZ amlodipine and benazepril Home medications for now given softer blood pressures and hypokalemia/electrolyte derangements  Discussed with patient at length regarding diabetes, diabetic educator consulted, will need supplies and medication for discharge.  Apparently he had been placed on metformin before but never ended up picking this up and at that time it was only prediabetes apparently  Resume CPAP/home unit that is available at night and as needed/with naps  PT/OT  Check a.m. CBC, BMP, magnesium, phosphorus  Continue hospitalization at current level of care        DVT prophylaxis:  Medical DVT prophylaxis orders are present.      Code Status (Patient has no pulse and is not breathing): CPR (Attempt to Resuscitate)  Medical Interventions (Patient has pulse or is breathing): Full Support      Patient is critically ill due to DKA type II with severe metabolic acidosis, renal failure, hypomagnesemia and likely derangements, with additional issues as noted above.  I have spent 31 minutes of critical care time reviewing documentation, pertinent labs, imaging studies, examining the patient, modifying care plan, and discussing patient's condition and care plan with the patient, nursing and pulm crit team.

## 2024-04-27 NOTE — PROGRESS NOTES
Pulmonary / Critical Care Progress Note      Patient Name: Tushar Castillo  : 1970  MRN: [id number]  Attending:  Jamie Pena MD   Date of admission: 2024    Subjective   Subjective   Patient critically ill with diabetic ketoacidosis    Over past 24 hours: Patient mated to the ICU initiated on insulin drip had anion gap metabolic acidosis, CT scan of the abdomen done.    No acute events overnight    This morning  Patient is awake  No nausea vomiting or diarrhea  Denies abdominal pain  Asking for oral diet  Total daily dose insulin requirement 115 units  A1c 16.6  Anion gap closed  Multiple electrolyte abnormalities        Objective   Objective     Vitals:   Vital signs for last 24 hours:  Temp:  [98 °F (36.7 °C)-98.5 °F (36.9 °C)] 98 °F (36.7 °C)  Heart Rate:  [] 83  Resp:  [19-22] 22  BP: (105-132)/() 115/65    Intake/Output last 3 shifts:  I/O last 3 completed shifts:  In: 42262.5 [P.O.:1440; I.V.:5022.5; IV Piggyback:4000]  Out: 3300 [Urine:3300]  Intake/Output this shift:  I/O this shift:  In: 240 [P.O.:240]  Out: -       Physical Exam   Vital Signs Reviewed   General:  WDWN, Alert, NAD.    HEENT:  PERRL, EOMI.  OP, nares clear  Chest:  good aeration, clear to auscultation bilaterally, tympanic to percussion bilaterally, no work of breathing noted  CV: RRR, no MGR, pulses 2+, equal.  Abd:  Soft, NT, ND, + BS, no HSM  EXT:  no clubbing, no cyanosis, no edema  Neuro:  A&Ox3, CN grossly intact, no focal deficits.  Skin: No rashes or lesions noted      Result Review    Result Review:  I have personally reviewed the results from the time of this admission to 2024 09:42 EDT and agree with these findings:  [x]  Laboratory  []  Microbiology  [x]  Radiology  [x]  EKG/Telemetry   []  Cardiology/Vascular   []  Pathology  [x]  Old records  []  Other:  Most notable findings include:       Lab 24  0755 24  0401 24  0002 24  1953 24  1605 24  1155  04/26/24  0742 04/26/24  0405 04/26/24  0152 04/25/24 2039 04/25/24 2039   WBC  --  9.60  --   --   --   --   --   --  15.83*  --  14.32*   HEMOGLOBIN  --  12.0*  --   --   --   --   --   --  15.2  --  15.2   HEMATOCRIT  --  35.3*  --   --   --   --   --   --  46.7  --  45.6   PLATELETS  --  172  --   --   --   --   --   --  235  --  257   SODIUM 136 136 135* 135* 134* 135* 137 136 135*  --  135*   POTASSIUM 3.3* 3.6 3.4* 3.5 3.2* 3.7 4.4 4.5 5.2  --  4.8   CHLORIDE 107 106 105 104 103 104 103 102 100  --  92*   CO2 16.6* 17.2* 16.3* 17.4* 16.0* 15.0* 9.2* 6.8* 5.1*  --  7.8*   BUN 3* 3* 3* 4* 4* 5* 5* 5* 6  --  6   CREATININE 0.88 0.96 0.87 0.94 1.03 1.07 1.14 1.21 1.17  --  1.48*   GLUCOSE 160* 106* 147* 128* 182* 201* 259* 243* 284*  --  363*   CALCIUM 8.4* 8.8 8.8 9.0 9.3 8.7 8.9 9.2 9.4  --  10.2   PHOSPHORUS 2.8 3.1 2.0* 1.6* 1.6* 2.1* 2.0* 2.6 3.1   < >  --    TOTAL PROTEIN  --   --   --   --   --   --   --  8.8* 9.4*  --  9.9*   ALBUMIN  --   --   --   --   --   --   --  4.2 4.3  --  4.8   GLOBULIN  --   --   --   --   --   --   --  4.6 5.1  --  5.1    < > = values in this interval not displayed.     CT Abdomen Pelvis With Contrast    Result Date: 4/26/2024   There is a small hiatal hernia with circumferential mural thickening. Age-indeterminate gastroesophagitis cannot be excluded. Consider imaging follow-up to ensure a benign progression. Consider EGD follow-up.  Motion artifact obscures detail on the exam.  There is nonspecific distention of the urinary bladder. There may be mild prostamegaly. Please correlate with pertinent lab values.  Otherwise, no acute findings are seen.  Please see above comments for further detail.     Please note that portions of this note were completed with a voice recognition program.    Electronically Signed By-Tushar Muro MD On:4/26/2024 12:54 AM      XR Chest 1 View    Result Date: 4/25/2024  Impression: No active disease is seen.   Electronically Signed By-PETR  MD PRATIK On:4/25/2024 9:20 PM         Assessment & Plan   Assessment / Plan     Active Hospital Problems:  Active Hospital Problems    Diagnosis     **DKA (diabetic ketoacidosis)     Nausea and vomiting     Paroxysmal atrial fibrillation     NICOLA (obstructive sleep apnea)     Essential hypertension      Impression:  Type II diabetic ketoacidosis without coma  Type 2 diabetes with hyperglycemia.  New diagnosis  Dehydration  Hypophosphatemia  Pseudohyponatremia  Hypokalemia  Hypomagnesemia  Acute kidney injury secondary to prerenal etiology  Atrial fibrillation on flecainide and Pradaxa  Obstructive sleep apnea  History of essential hypertension  Class II obesity with BMI greater than 35     Plan:  -Will transition insulin drip to LU/SSI  -Add Levemir 35 units twice daily  -SSI  -Diabetes education consulted  -Discontinue IV fluid exchanges and insulin drip  -Discontinue serial lab draws  -Advance diet, carb consistent  -Trend renal function, monitor replace electrolytes replace IV magnesium and potassium orally  -CT scan of the abdomen pelvis does have mural thickening concern for gastroesophagitis, defer to primary may need outpatient evaluation by GI  -Continue Pradaxa  -Continue flecainide  -Continue statin     Ok to transfer out of ICU     DVT prophylaxis:  Medical DVT prophylaxis orders are present.      CODE STATUS:   Code Status (Patient has no pulse and is not breathing): CPR (Attempt to Resuscitate)  Medical Interventions (Patient has pulse or is breathing): Full Support      Patient is critically ill with type II DKA without coma, new diagnosis of type 2 diabetes, acute kidney injury, dehydration, multiple electrolyte disturbances. We have personally reviewed all pertinent labs, imaging, microbiology and documentation. We have discussed care with the primary service as well as at multidisciplinary critical care rounds with the bedside nurse, respiratory therapist, pharmacist and all other ancillary  services. 34 minutes of critical care time was spent managing this patient, excluding procedures. Of this time, I spent 20 minutes in accordance with split shared billing.    I, TORIN Bone, spent 14 minutes critical care time.  Electronically signed by TORIN Weiss, 04/27/24, 12:37 PM EDT.    Electronically signed by Matheus Hicks MD, 04/27/24, 12:50 PM EDT.

## 2024-04-28 ENCOUNTER — READMISSION MANAGEMENT (OUTPATIENT)
Dept: CALL CENTER | Facility: HOSPITAL | Age: 54
End: 2024-04-28
Payer: OTHER GOVERNMENT

## 2024-04-28 VITALS
OXYGEN SATURATION: 100 % | WEIGHT: 253.97 LBS | TEMPERATURE: 98.2 F | RESPIRATION RATE: 18 BRPM | HEART RATE: 81 BPM | HEIGHT: 71 IN | BODY MASS INDEX: 35.56 KG/M2 | DIASTOLIC BLOOD PRESSURE: 79 MMHG | SYSTOLIC BLOOD PRESSURE: 134 MMHG

## 2024-04-28 LAB
ANION GAP SERPL CALCULATED.3IONS-SCNC: 13.5 MMOL/L (ref 5–15)
BASOPHILS # BLD AUTO: 0.03 10*3/MM3 (ref 0–0.2)
BASOPHILS NFR BLD AUTO: 0.4 % (ref 0–1.5)
BUN SERPL-MCNC: 3 MG/DL (ref 6–20)
BUN/CREAT SERPL: 3.7 (ref 7–25)
CALCIUM SPEC-SCNC: 9 MG/DL (ref 8.6–10.5)
CHLORIDE SERPL-SCNC: 106 MMOL/L (ref 98–107)
CO2 SERPL-SCNC: 17.5 MMOL/L (ref 22–29)
CREAT SERPL-MCNC: 0.81 MG/DL (ref 0.76–1.27)
DEPRECATED RDW RBC AUTO: 40.9 FL (ref 37–54)
EGFRCR SERPLBLD CKD-EPI 2021: 104.8 ML/MIN/1.73
EOSINOPHIL # BLD AUTO: 0.31 10*3/MM3 (ref 0–0.4)
EOSINOPHIL NFR BLD AUTO: 4.5 % (ref 0.3–6.2)
ERYTHROCYTE [DISTWIDTH] IN BLOOD BY AUTOMATED COUNT: 13.6 % (ref 12.3–15.4)
GLUCOSE BLDC GLUCOMTR-MCNC: 236 MG/DL (ref 70–99)
GLUCOSE BLDC GLUCOMTR-MCNC: 257 MG/DL (ref 70–99)
GLUCOSE BLDC GLUCOMTR-MCNC: 275 MG/DL (ref 70–99)
GLUCOSE BLDC GLUCOMTR-MCNC: 292 MG/DL (ref 70–99)
GLUCOSE SERPL-MCNC: 281 MG/DL (ref 65–99)
HCT VFR BLD AUTO: 34.9 % (ref 37.5–51)
HGB BLD-MCNC: 12 G/DL (ref 13–17.7)
IMM GRANULOCYTES # BLD AUTO: 0.01 10*3/MM3 (ref 0–0.05)
IMM GRANULOCYTES NFR BLD AUTO: 0.1 % (ref 0–0.5)
LYMPHOCYTES # BLD AUTO: 2.57 10*3/MM3 (ref 0.7–3.1)
LYMPHOCYTES NFR BLD AUTO: 37 % (ref 19.6–45.3)
MAGNESIUM SERPL-MCNC: 1.9 MG/DL (ref 1.6–2.6)
MCH RBC QN AUTO: 28.4 PG (ref 26.6–33)
MCHC RBC AUTO-ENTMCNC: 34.4 G/DL (ref 31.5–35.7)
MCV RBC AUTO: 82.5 FL (ref 79–97)
MONOCYTES # BLD AUTO: 0.97 10*3/MM3 (ref 0.1–0.9)
MONOCYTES NFR BLD AUTO: 14 % (ref 5–12)
NEUTROPHILS NFR BLD AUTO: 3.05 10*3/MM3 (ref 1.7–7)
NEUTROPHILS NFR BLD AUTO: 44 % (ref 42.7–76)
NRBC BLD AUTO-RTO: 0 /100 WBC (ref 0–0.2)
PHOSPHATE SERPL-MCNC: 3.8 MG/DL (ref 2.5–4.5)
PLATELET # BLD AUTO: 153 10*3/MM3 (ref 140–450)
PMV BLD AUTO: 12.2 FL (ref 6–12)
POTASSIUM SERPL-SCNC: 3.4 MMOL/L (ref 3.5–5.2)
RBC # BLD AUTO: 4.23 10*6/MM3 (ref 4.14–5.8)
SODIUM SERPL-SCNC: 137 MMOL/L (ref 136–145)
WBC NRBC COR # BLD AUTO: 6.94 10*3/MM3 (ref 3.4–10.8)

## 2024-04-28 PROCEDURE — 82948 REAGENT STRIP/BLOOD GLUCOSE: CPT | Performed by: INTERNAL MEDICINE

## 2024-04-28 PROCEDURE — 25010000002 MAGNESIUM SULFATE 2 GM/50ML SOLUTION: Performed by: INTERNAL MEDICINE

## 2024-04-28 PROCEDURE — 85025 COMPLETE CBC W/AUTO DIFF WBC: CPT | Performed by: INTERNAL MEDICINE

## 2024-04-28 PROCEDURE — 63710000001 INSULIN LISPRO (HUMAN) PER 5 UNITS: Performed by: NURSE PRACTITIONER

## 2024-04-28 PROCEDURE — 82948 REAGENT STRIP/BLOOD GLUCOSE: CPT

## 2024-04-28 PROCEDURE — 99233 SBSQ HOSP IP/OBS HIGH 50: CPT | Performed by: INTERNAL MEDICINE

## 2024-04-28 PROCEDURE — 84100 ASSAY OF PHOSPHORUS: CPT | Performed by: INTERNAL MEDICINE

## 2024-04-28 PROCEDURE — 94799 UNLISTED PULMONARY SVC/PX: CPT

## 2024-04-28 PROCEDURE — 83735 ASSAY OF MAGNESIUM: CPT | Performed by: INTERNAL MEDICINE

## 2024-04-28 PROCEDURE — 25810000003 SODIUM CHLORIDE 0.9 % SOLUTION 500 ML FLEX CONT: Performed by: FAMILY MEDICINE

## 2024-04-28 PROCEDURE — 63710000001 INSULIN DETEMIR PER 5 UNITS: Performed by: INTERNAL MEDICINE

## 2024-04-28 PROCEDURE — 82948 REAGENT STRIP/BLOOD GLUCOSE: CPT | Performed by: NURSE PRACTITIONER

## 2024-04-28 PROCEDURE — 80048 BASIC METABOLIC PNL TOTAL CA: CPT | Performed by: INTERNAL MEDICINE

## 2024-04-28 PROCEDURE — 63710000001 INSULIN LISPRO (HUMAN) PER 5 UNITS: Performed by: INTERNAL MEDICINE

## 2024-04-28 PROCEDURE — 99239 HOSP IP/OBS DSCHRG MGMT >30: CPT | Performed by: INTERNAL MEDICINE

## 2024-04-28 RX ORDER — METFORMIN HYDROCHLORIDE 500 MG/1
1000 TABLET, EXTENDED RELEASE ORAL
Qty: 60 TABLET | Refills: 0 | Status: SHIPPED | OUTPATIENT
Start: 2024-04-28 | End: 2024-05-28

## 2024-04-28 RX ORDER — MAGNESIUM SULFATE HEPTAHYDRATE 40 MG/ML
2 INJECTION, SOLUTION INTRAVENOUS ONCE
Status: COMPLETED | OUTPATIENT
Start: 2024-04-28 | End: 2024-04-28

## 2024-04-28 RX ORDER — LANCETS 33 GAUGE
1 EACH MISCELLANEOUS
Qty: 100 EACH | Refills: 0 | Status: SHIPPED | OUTPATIENT
Start: 2024-04-28

## 2024-04-28 RX ORDER — SODIUM BICARBONATE 650 MG/1
1300 TABLET ORAL 3 TIMES DAILY
Status: DISCONTINUED | OUTPATIENT
Start: 2024-04-28 | End: 2024-04-28 | Stop reason: HOSPADM

## 2024-04-28 RX ORDER — POTASSIUM CHLORIDE 750 MG/1
40 CAPSULE, EXTENDED RELEASE ORAL ONCE
Status: COMPLETED | OUTPATIENT
Start: 2024-04-28 | End: 2024-04-28

## 2024-04-28 RX ORDER — INSULIN LISPRO 100 [IU]/ML
4 INJECTION, SOLUTION INTRAVENOUS; SUBCUTANEOUS
Status: DISCONTINUED | OUTPATIENT
Start: 2024-04-28 | End: 2024-04-28 | Stop reason: HOSPADM

## 2024-04-28 RX ORDER — FAMOTIDINE 20 MG/1
20 TABLET, FILM COATED ORAL
Status: DISCONTINUED | OUTPATIENT
Start: 2024-04-28 | End: 2024-04-28 | Stop reason: HOSPADM

## 2024-04-28 RX ORDER — BLOOD-GLUCOSE METER
1 KIT MISCELLANEOUS
Qty: 1 EACH | Refills: 0 | Status: SHIPPED | OUTPATIENT
Start: 2024-04-28 | End: 2024-05-28

## 2024-04-28 RX ORDER — FAMOTIDINE 20 MG/1
20 TABLET, FILM COATED ORAL
Qty: 60 TABLET | Refills: 0 | Status: SHIPPED | OUTPATIENT
Start: 2024-04-28 | End: 2024-05-28

## 2024-04-28 RX ORDER — POTASSIUM CHLORIDE 750 MG/1
40 CAPSULE, EXTENDED RELEASE ORAL ONCE
Status: DISCONTINUED | OUTPATIENT
Start: 2024-04-28 | End: 2024-04-28

## 2024-04-28 RX ORDER — INSULIN LISPRO 100 [IU]/ML
4-14 INJECTION, SOLUTION INTRAVENOUS; SUBCUTANEOUS
Qty: 13 ML | Refills: 1 | Status: SHIPPED | OUTPATIENT
Start: 2024-04-28 | End: 2024-05-28

## 2024-04-28 RX ADMIN — Medication 10 ML: at 09:06

## 2024-04-28 RX ADMIN — ROSUVASTATIN 40 MG: 20 TABLET, FILM COATED ORAL at 09:04

## 2024-04-28 RX ADMIN — INSULIN LISPRO 4 UNITS: 100 INJECTION, SOLUTION INTRAVENOUS; SUBCUTANEOUS at 12:28

## 2024-04-28 RX ADMIN — INSULIN LISPRO 6 UNITS: 100 INJECTION, SOLUTION INTRAVENOUS; SUBCUTANEOUS at 12:28

## 2024-04-28 RX ADMIN — FLECAINIDE ACETATE 50 MG: 50 TABLET ORAL at 09:04

## 2024-04-28 RX ADMIN — MAGNESIUM SULFATE HEPTAHYDRATE 2 G: 40 INJECTION, SOLUTION INTRAVENOUS at 09:07

## 2024-04-28 RX ADMIN — DABIGATRAN ETEXILATE MESYLATE 150 MG: 150 CAPSULE ORAL at 09:05

## 2024-04-28 RX ADMIN — POTASSIUM CHLORIDE 40 MEQ: 750 CAPSULE, EXTENDED RELEASE ORAL at 09:05

## 2024-04-28 RX ADMIN — INSULIN DETEMIR 40 UNITS: 100 INJECTION, SOLUTION SUBCUTANEOUS at 09:05

## 2024-04-28 RX ADMIN — SODIUM CHLORIDE 40 ML: 9 INJECTION, SOLUTION INTRAVENOUS at 09:06

## 2024-04-28 RX ADMIN — SENNOSIDES AND DOCUSATE SODIUM 2 TABLET: 50; 8.6 TABLET ORAL at 09:05

## 2024-04-28 RX ADMIN — INSULIN LISPRO 6 UNITS: 100 INJECTION, SOLUTION INTRAVENOUS; SUBCUTANEOUS at 09:05

## 2024-04-28 RX ADMIN — METOPROLOL SUCCINATE 100 MG: 50 TABLET, EXTENDED RELEASE ORAL at 09:04

## 2024-04-28 RX ADMIN — SODIUM BICARBONATE 650 MG TABLET 1300 MG: at 12:28

## 2024-04-28 NOTE — OUTREACH NOTE
Prep Survey      Flowsheet Row Responses   [facility] facility patient discharged from? [facility]   Is LACE score < 7 ? No   Eligibility Readm Mgmt   Discharge diagnosis DKA   Does the patient have one of the following disease processes/diagnoses(primary or secondary)? Other   Does the patient have Home health ordered? No   Is there a DME ordered? No   Prep survey completed? Yes            Marjorie ACOSTA - Registered Nurse

## 2024-04-28 NOTE — DISCHARGE INSTRUCTIONS
Sliding scale insulin - check your blood glucose/sugar before meals and administer insulin based on the value of your blood glucose    Blood glucose 150-199 mg/dL - give 4 units  Blood glucose 200-249 mg/dL - 6 units   Blood glucose 250-299 mg/dL - 8 units   Blood glucose 300-349 mg/dL - 10 units   Blood glucose 350-400 mg/dL - 12 units   Blood glucose greater than 400 mg/dL - 14 units

## 2024-04-28 NOTE — PROGRESS NOTES
[facility]   Hospitalist Progress Note    Date of admission: 4/25/2024  Patient Name: Tushar Castillo  1970  Date: 4/27/2024      Subjective     Chief Complaint   Patient presents with    Nausea    Vomiting    Shortness of Breath     Pt states he has been soa for 2 days, and has had n/v for the last 3 days       Interval Followup: Feeling better appetite improving.  No fevers    Objective     Vitals:   Temp:  [97.8 °F (36.6 °C)-98.5 °F (36.9 °C)] 98.2 °F (36.8 °C)  Heart Rate:  [] 77  Resp:  [18-22] 18  BP: (107-135)/() 126/63    Physical Exam  Awake conversant morbidly obese  CTAB no wheezing  Rrr, no lower extremity pitting edema  Obese abdomen soft nontender nondistended  Moves extremities spontaneously  AOx3      Result Review:  Vital signs, labs and recent relevant imaging reviewed.      CBC          4/25/2024    20:39 4/26/2024    01:52 4/27/2024    04:01   CBC   WBC 14.32  15.83  9.60    RBC 5.31  5.36  4.20    Hemoglobin 15.2  15.2  12.0    Hematocrit 45.6  46.7  35.3    MCV 85.9  87.1  84.0    MCH 28.6  28.4  28.6    MCHC 33.3  32.5  34.0    RDW 13.6  14.0  13.6    Platelets 257  235  172      CMP          4/25/2024    20:39 4/26/2024    01:52 4/26/2024    04:05 4/26/2024    07:42 4/26/2024    11:55 4/26/2024    16:05 4/26/2024    19:53 4/27/2024    00:02 4/27/2024    04:01   CMP   Glucose 363  284  243  259  201  182  128  147  106    BUN 6  6  5  5  5  4  4  3  3    Creatinine 1.48  1.17  1.21  1.14  1.07  1.03  0.94  0.87  0.96    EGFR 55.9  74.1  71.2  76.4  82.5  86.3  96.3  102.5  93.9    Sodium 135  135  136  137  135  134  135  135  136    Potassium 4.8  5.2  4.5  4.4  3.7  3.2  3.5  3.4  3.6    Chloride 92  100  102  103  104  103  104  105  106    Calcium 10.2  9.4  9.2  8.9  8.7  9.3  9.0  8.8  8.8    Total Protein 9.9  9.4  8.8          Albumin 4.8  4.3  4.2          Globulin 5.1  5.1  4.6          Total Bilirubin 0.3  0.2  0.2          Alkaline Phosphatase 139  128  121           AST (SGOT) 25  22  19          ALT (SGPT) 25  23  22          Albumin/Globulin Ratio 0.9  0.8  0.9          BUN/Creatinine Ratio 4.1  5.1  4.1  4.4  4.7  3.9  4.3  3.4  3.1    Anion Gap 35.2  29.9  27.2  24.8  16.0  15.0  13.6  13.7  12.8          4/27/2024    07:55   CMP   Glucose 160    BUN 3    Creatinine 0.88    EGFR 102.2    Sodium 136    Potassium 3.3    Chloride 107    Calcium 8.4    Total Protein    Albumin    Globulin    Total Bilirubin    Alkaline Phosphatase    AST (SGOT)    ALT (SGPT)    Albumin/Globulin Ratio    BUN/Creatinine Ratio 3.4    Anion Gap 12.4          acetaminophen    aluminum-magnesium hydroxide-simethicone    senna-docusate sodium **AND** polyethylene glycol **AND** bisacodyl **AND** bisacodyl    dextrose    dextrose    glucagon (human recombinant)    hydrOXYzine    Lidocaine    melatonin    nicotine    nitroglycerin    ondansetron    prochlorperazine    sodium chloride    sodium chloride    sodium chloride    sodium chloride    dabigatran etexilate, 150 mg, Oral, Q12H  flecainide, 50 mg, Oral, BID  [Held by provider] hydroCHLOROthiazide, 25 mg, Oral, Daily  [START ON 4/28/2024] insulin detemir, 40 Units, Subcutaneous, Q12H  insulin lispro, 2-9 Units, Subcutaneous, 4x Daily AC & at Bedtime  metoprolol succinate XL, 100 mg, Oral, Daily  mirtazapine, 30 mg, Oral, Nightly  potassium & sodium phosphates, 1 packet, Oral, 4x Daily With Meals & Nightly  rosuvastatin, 40 mg, Oral, Daily  senna-docusate sodium, 2 tablet, Oral, BID  sodium chloride, 10 mL, Intravenous, Q12H  sodium chloride, 10 mL, Intravenous, Q12H  sodium chloride, 10 mL, Intravenous, Q12H        CT Abdomen Pelvis With Contrast    Result Date: 4/26/2024   There is a small hiatal hernia with circumferential mural thickening. Age-indeterminate gastroesophagitis cannot be excluded. Consider imaging follow-up to ensure a benign progression. Consider EGD follow-up.  Motion artifact obscures detail on the exam.  There is  nonspecific distention of the urinary bladder. There may be mild prostamegaly. Please correlate with pertinent lab values.  Otherwise, no acute findings are seen.  Please see above comments for further detail.     Please note that portions of this note were completed with a voice recognition program.    Electronically Signed By-Tushar Muro MD On:4/26/2024 12:54 AM      XR Chest 1 View    Result Date: 4/25/2024  Impression: No active disease is seen.   Electronically Signed By-PETR CHRISTIE MD On:4/25/2024 9:20 PM       Assessment / Plan     Summary: 49-year-old male with history of A-fib on Pradaxa, prediabetes, NICOLA on home CPAP who presented with nausea vomiting polyuria polydipsia found to have DKA.  Admitted to the ICU    Assessment/Plan (clinically significant if listed here)  DKA, type II without coma  DM2, previously apparently was prediabetic  PETER, prerenal secondary to above  A-fib on Pradaxa  Hypertension  Hypomagnesemia  Hypophosphatemia  Hypokalemia  Metabolic acidosis  Prostatomegaly.  CT imaging  Gastroesophagitis per CT imaging, outpatient EGD follow-up needed  Morbid obesity BMI 35  Depression    Close still with mild metabolic acidosis, transition off insulin drip to Levemir, SSI, monitor trend, glucose trying to increase for evening dosing, increase Levemir evening dose further, continue monitor closely  Start advance diet, diabetic  Replace potassium, magnesium monitor electrolytes  Dabigatran and flecainide metoprolol continued for A-fib  Holding home HCTZ amlodipine and benazepril Home medications for now given softer blood pressures and hypokalemia/electrolyte derangements  Continue statin  Continue mirtazapine, will recommend follow-up with PCP to discuss alternative antidepressant given patient's morbid obesity  PETER resolved with fluid resuscitation, monitor for retention  No overt reflux symptoms, monitor, gastroesophagitis suggested by imaging, follow-up outpatient for EGD and can also  do colonoscopy at that time if needed.  Placed on famotidine for now.  Maalox did have several episodes of nausea and vomiting in setting of his DKA  Discussed with patient at length regarding diabetes, diabetic educator consulted, will need supplies and medication for discharge.  Apparently he had been placed on metformin before but never ended up picking this up and at that time it was only prediabetes apparently  Cont CPAP/home unit that is available at night and as needed/with naps  PT/OT  Check a.m. CBC, BMP, magnesium, phosphorus  Transfer out of icu, d/w pulmonology  Hopefully home in 1 to 2 days once on stable insulin regimen        DVT prophylaxis:  Medical DVT prophylaxis orders are present.      Code Status (Patient has no pulse and is not breathing): CPR (Attempt to Resuscitate)  Medical Interventions (Patient has pulse or is breathing): Full Support

## 2024-04-28 NOTE — DISCHARGE SUMMARY
[facility]         HOSPITALIST  DISCHARGE SUMMARY    Patient Name: Tushar Castillo    : 1970    MRN: [id number]    Date of Admission: 2024  Date of Discharge:  24  Primary Care Physician: Lashell Quinn APRN    Consults       Date and Time Order Name Status Description    2024  4:58 AM Inpatient Pulmonology Consult Completed     2024 12:58 AM Hospitalist (on-call MD unless specified)              Final Diagnosis:  DKA, type II without coma  DM2, previously apparently was prediabetic  PETER, prerenal secondary to above  A-fib on Pradaxa  Hypertension  Hypomagnesemia  Hypophosphatemia  Hypokalemia  Metabolic acidosis  Prostatomegaly.  CT imaging  Gastroesophagitis per CT imaging, outpatient EGD follow-up needed  Morbid obesity BMI 35  Depression    Hospital Course     Hospital Course:  49-year-old male with history of A-fib on Pradaxa, prediabetes, NICOLA on home CPAP who presented with nausea vomiting polyuria polydipsia found to have DKA. Admitted to the ICU, treated with dka protocol, insulin drip, transition to Levemir SSI and patient had never started on his outpatient metformin and this was prescribed for discharge with parameters for uptitrating/monitoring for GI symptoms with this.  New supplies/meter were sent for discharge.  He met with a diabetic educator for instruction as well.  Blood pressure medications were adjusted as he is no longer needing all of the several different medications he was on previously, he may need these in the future reassess with his PCP    Patient with mild GERD symptoms, started on famotidine, some esophagitis noted on CT imaging, outpatient GI follow-up planned, if has not had colonoscopy/could attain screening colonoscopy at the same time as evaluation for possible EGD.    Patient discharged in stable condition with close PCP and GI follow up. Return precautions and follow up discussed and patient voiced agreement and understanding  of treatment plan.     DISCHARGE Follow Up Recommendations for labs and diagnostics:   Patient counseled on SSI instructions/titration of his Levemir based on continued blood glucose trend as outpatient until close follow-up with PCP.    CODE STATUS:  Code Status and Medical Interventions:   Ordered at: 04/26/24 1109     Code Status (Patient has no pulse and is not breathing):    CPR (Attempt to Resuscitate)     Medical Interventions (Patient has pulse or is breathing):    Full Support           Day of Discharge     Vital Signs:  Temp:  [97.9 °F (36.6 °C)-98.2 °F (36.8 °C)] 97.9 °F (36.6 °C)  Heart Rate:  [77-85] 80  Resp:  [18] 18  BP: (113-129)/(59-84) 120/79    Physical Exam    Gen: awake, resting in bed, conversant  Resp: breathing comfortably on room air no wheezing  CV: RRR, no LE pitting edema      Discharge Details        Discharge Medications        New Medications        Instructions Start Date   famotidine 20 MG tablet  Commonly known as: PEPCID   20 mg, Oral, 2 Times Daily Before Meals      glucose blood test strip  Commonly known as: ONE TOUCH ULTRA TEST   1 each, Other, 3 Times Daily Before Meals, Use as instructed      Insulin Glargine 100 UNIT/ML injection pen  Commonly known as: LANTUS SOLOSTAR   45 Units, Subcutaneous, 2 Times Daily      Insulin Lispro (1 Unit Dial) 100 UNIT/ML solution pen-injector  Commonly known as: HumaLOG KwikPen   4-14 Units, Subcutaneous, 3 Times Daily Before Meals, Blood glucose 150-199 mg/dL - 4 units Blood glucose 200-249 mg/dL - 6 units Blood glucose 250-299 mg/dL - 8 units Blood glucose 300-349 mg/dL - 10 units Blood glucose 350-400 mg/dL - 12 units Blood glucose greater than 400 mg/dL - 14 units      Insulin Pen Needle 31G X 6 MM misc   1 each, Subcutaneous, 3 Times Daily Before Meals      ONE TOUCH ULTRA MINI w/Device kit   1 each, Other, 3 Times Daily Before Meals      OneTouch Delica Lancets 33G misc   1 each, Other, 3 Times Daily Before Meals             Continue  These Medications        Instructions Start Date   dabigatran etexilate 150 MG capsu  Commonly known as: PRADAXA   Take 1 capsule by mouth 2 (Two) Times a Day.      ergocalciferol 1.25 MG (63539 UT) capsule  Commonly known as: ERGOCALCIFEROL   50,000 Units, Oral, Weekly      flecainide 50 MG tablet  Commonly known as: TAMBOCOR   50 mg, Oral, 2 Times Daily      metFORMIN  MG 24 hr tablet  Commonly known as: GLUCOPHAGE-XR   1,000 mg, Oral, Daily With Breakfast      metoprolol succinate  MG 24 hr tablet  Commonly known as: TOPROL-XL   100 mg, Oral, 2 Times Daily      mirtazapine 30 MG tablet  Commonly known as: REMERON   30 mg, Oral, Nightly      multivitamin with minerals tablet tablet   1 tablet, Oral, Daily      rosuvastatin 40 MG tablet  Commonly known as: CRESTOR   40 mg, Oral, Daily             Stop These Medications      amLODIPine-benazepril 10-40 MG per capsule  Commonly known as: Lotrel     hydroCHLOROthiazide 25 MG tablet                Discharge Disposition:  Home or Self Care    Diet: continue on diet / dietary restrictions from hospitalization     Discharge Activity: advance as tolerated      Additional Instructions for the Follow-ups that You Need to Schedule       Discharge Follow-up with PCP   As directed       Currently Documented PCP:    Lashell Quinn APRN    PCP Phone Number:    [phone]     Follow Up Details: 3-5 days hospital follow up dka                Pertinent  and/or Most Recent Results       LAB RESULTS:      Lab 04/28/24  0507 04/27/24  0401 04/26/24  0152 04/25/24  2241 04/25/24  2039   WBC 6.94 9.60 15.83*  --  14.32*   HEMOGLOBIN 12.0* 12.0* 15.2  --  15.2   HEMATOCRIT 34.9* 35.3* 46.7  --  45.6   PLATELETS 153 172 235  --  257   NEUTROS ABS 3.05 4.51 11.89*  --  10.61*   IMMATURE GRANS (ABS) 0.01 0.03 0.07*  --  0.08*   LYMPHS ABS 2.57 3.57* 2.53  --  2.33   MONOS ABS 0.97* 1.23* 1.30*  --  1.25*   EOS ABS 0.31 0.22 0.00  --  0.01   MCV 82.5 84.0 87.1  --  85.9    LACTATE  --   --   --  1.5  --          Lab 04/28/24  0507 04/27/24  0755 04/27/24  0401 04/27/24  0002 04/26/24 1953 04/26/24 0405 04/26/24 0152   SODIUM 137 136 136 135* 135*   < > 135*   POTASSIUM 3.4* 3.3* 3.6 3.4* 3.5   < > 5.2   CHLORIDE 106 107 106 105 104   < > 100   CO2 17.5* 16.6* 17.2* 16.3* 17.4*   < > 5.1*   ANION GAP 13.5 12.4 12.8 13.7 13.6   < > 29.9*   BUN 3* 3* 3* 3* 4*   < > 6   CREATININE 0.81 0.88 0.96 0.87 0.94   < > 1.17   EGFR 104.8 102.2 93.9 102.5 96.3   < > 74.1   GLUCOSE 281* 160* 106* 147* 128*   < > 284*   CALCIUM 9.0 8.4* 8.8 8.8 9.0   < > 9.4   MAGNESIUM 1.9 1.8 1.7 1.6 1.7   < > 2.0   PHOSPHORUS 3.8 2.8 3.1 2.0* 1.6*   < > 3.1   HEMOGLOBIN A1C  --   --   --   --   --   --  16.60*    < > = values in this interval not displayed.         Lab 04/26/24  0405 04/26/24  0152 04/25/24 2039   TOTAL PROTEIN 8.8* 9.4* 9.9*   ALBUMIN 4.2 4.3 4.8   GLOBULIN 4.6 5.1 5.1   ALT (SGPT) 22 23 25   AST (SGOT) 19 22 25   BILIRUBIN 0.2 0.2 0.3   ALK PHOS 121* 128* 139*         Lab 04/25/24 2039   PROBNP 59.2   HSTROP T 17                 Lab 04/26/24 0755   PH, ARTERIAL 7.269*   PCO2, ARTERIAL 19.4*   PO2 .0   O2 SATURATION ART 97.3   HCO3 ART 8.7*   BASE EXCESS ART -15.9*   CARBOXYHEMOGLOBIN 0.1     Brief Urine Lab Results  (Last result in the past 365 days)        Color   Clarity   Blood   Leuk Est   Nitrite   Protein   CREAT   Urine HCG        04/25/24 2212 Yellow   Clear   Small (1+)   Negative   Negative   100 mg/dL (2+)                 Microbiology Results (last 10 days)       Procedure Component Value - Date/Time    Blood Culture - Blood, Hand, Right [[id number]]  (Normal) Collected: 04/25/24 2241    Lab Status: Preliminary result Specimen: Blood from Hand, Right Updated: 04/27/24 2246     Blood Culture No growth at 2 days    Blood Culture - Blood, Arm, Right [[id number]]  (Normal) Collected: 04/25/24 2212    Lab Status: Preliminary result Specimen: Blood from Arm, Right Updated:  04/27/24 2231     Blood Culture No growth at 2 days            RADIOLOGY:    CT Abdomen Pelvis With Contrast    Result Date: 4/26/2024  Impression:  There is a small hiatal hernia with circumferential mural thickening. Age-indeterminate gastroesophagitis cannot be excluded. Consider imaging follow-up to ensure a benign progression. Consider EGD follow-up.  Motion artifact obscures detail on the exam.  There is nonspecific distention of the urinary bladder. There may be mild prostamegaly. Please correlate with pertinent lab values.  Otherwise, no acute findings are seen.  Please see above comments for further detail.     Please note that portions of this note were completed with a voice recognition program.    Electronically Signed By-Tushar Muro MD On:4/26/2024 12:54 AM      XR Chest 1 View    Result Date: 4/25/2024  Impression: Impression: No active disease is seen.   Electronically Signed By-PETR CHRISTIE MD On:4/25/2024 9:20 PM       Results for orders placed during the hospital encounter of 11/30/23    Duplex venous lower extremity right CAR    Interpretation Summary    Normal right lower extremity venous duplex scan.      Results for orders placed during the hospital encounter of 11/30/23    Duplex venous lower extremity right CAR    Interpretation Summary    Normal right lower extremity venous duplex scan.          Labs Pending at Discharge:  Pending Labs       Order Current Status    Blood Culture - Blood, Arm, Right Preliminary result    Blood Culture - Blood, Hand, Right Preliminary result              Time spent on Discharge including face to face service:  >45 minutes

## 2024-04-28 NOTE — PLAN OF CARE
Goal Outcome Evaluation:              Outcome Evaluation: Patient had no complaints of pain overnight. Educated the patient diabetes, Allow the patient to give insulin shot himself since he is going to be going home on insulin, patient did great.

## 2024-04-28 NOTE — PLAN OF CARE
Goal Outcome Evaluation:      Patient had no complaints of pain or discomfort, vitals stable, able to give self insulin shots and check blood glucose with hospital meter, educated on importance of asking home pharmacy about discharge medications and home meter use.

## 2024-04-28 NOTE — PROGRESS NOTES
Pulmonary / Critical Care Progress Note      Patient Name: Tushar Castillo  : 1970  MRN: [id number]  Attending:  Jamie Pena MD   Date of admission: 2024    Subjective   Subjective   Follow-up for diabetic ketoacidosis    Out of ICU  On room air  Still with some metabolic acidosis this morning and sugars remaining greater than 200  Multiple electrolyte disturbances  Tolerating diet  Patient is awake  No nausea vomiting or diarrhea  Denies abdominal pain  A1c 16.6      Objective   Objective     Vitals:   Vital signs for last 24 hours:  Temp:  [97.9 °F (36.6 °C)-98.2 °F (36.8 °C)] 97.9 °F (36.6 °C)  Heart Rate:  [77-86] 80  Resp:  [18] 18  BP: (113-129)/(59-89) 120/79    Intake/Output last 3 shifts:  I/O last 3 completed shifts:  In: 3485 [P.O.:960; I.V.:2525]  Out: 1650 [Urine:1650]  Intake/Output this shift:  I/O this shift:  In: 240 [P.O.:240]  Out: -       Physical Exam   Vital Signs Reviewed   General:  WDWN, Alert, NAD.    HEENT:  PERRL, EOMI.  OP, nares clear  Chest:  good aeration, clear to auscultation bilaterally, tympanic to percussion bilaterally, no work of breathing noted  CV: RRR, no MGR, pulses 2+, equal.  Abd:  Soft, NT, ND, + BS, no HSM, obese  EXT:  no clubbing, no cyanosis, no edema  Neuro:  A&Ox3, CN grossly intact, no focal deficits.  Skin: No rashes or lesions noted      Result Review    Result Review:  I have personally reviewed the results from the time of this admission to 2024 12:18 EDT and agree with these findings:  [x]  Laboratory  []  Microbiology  [x]  Radiology  [x]  EKG/Telemetry   []  Cardiology/Vascular   []  Pathology  [x]  Old records  []  Other:  Most notable findings include:       Lab 24  0507 24  0755 24  0401 24  0002 24  1953 24  1605 24  1155 24  0742 24  0405 24  0152 24   WBC 6.94  --  9.60  --   --   --   --   --   --  15.83*  --  14.32*   HEMOGLOBIN 12.0*  --   12.0*  --   --   --   --   --   --  15.2  --  15.2   HEMATOCRIT 34.9*  --  35.3*  --   --   --   --   --   --  46.7  --  45.6   PLATELETS 153  --  172  --   --   --   --   --   --  235  --  257   SODIUM 137 136 136 135* 135* 134* 135*   < > 136 135*  --  135*   POTASSIUM 3.4* 3.3* 3.6 3.4* 3.5 3.2* 3.7   < > 4.5 5.2  --  4.8   CHLORIDE 106 107 106 105 104 103 104   < > 102 100  --  92*   CO2 17.5* 16.6* 17.2* 16.3* 17.4* 16.0* 15.0*   < > 6.8* 5.1*  --  7.8*   BUN 3* 3* 3* 3* 4* 4* 5*   < > 5* 6  --  6   CREATININE 0.81 0.88 0.96 0.87 0.94 1.03 1.07   < > 1.21 1.17  --  1.48*   GLUCOSE 281* 160* 106* 147* 128* 182* 201*   < > 243* 284*  --  363*   CALCIUM 9.0 8.4* 8.8 8.8 9.0 9.3 8.7   < > 9.2 9.4  --  10.2   PHOSPHORUS 3.8 2.8 3.1 2.0* 1.6* 1.6* 2.1*   < > 2.6 3.1   < >  --    TOTAL PROTEIN  --   --   --   --   --   --   --   --  8.8* 9.4*  --  9.9*   ALBUMIN  --   --   --   --   --   --   --   --  4.2 4.3  --  4.8   GLOBULIN  --   --   --   --   --   --   --   --  4.6 5.1  --  5.1    < > = values in this interval not displayed.     No radiology results for the last day      Assessment & Plan   Assessment / Plan     Active Hospital Problems:  Active Hospital Problems    Diagnosis     **DKA (diabetic ketoacidosis)     Nausea and vomiting     Paroxysmal atrial fibrillation     NICOLA (obstructive sleep apnea)     Essential hypertension      Impression:  Type II diabetic ketoacidosis without coma  Type 2 diabetes with hyperglycemia.  New diagnosis  Dehydration  Hypophosphatemia  Pseudohyponatremia  Hypokalemia  Hypomagnesemia  Acute kidney injury secondary to prerenal etiology  Atrial fibrillation on flecainide and Pradaxa  Obstructive sleep apnea  History of essential hypertension  Class II obesity with BMI greater than 35     Plan:  Agree with increasing Levemir to 45 units twice daily.  Continue sliding scale insulin  Patient is at high risk of anion gap opening up again given labs from this morning  Oral bicarb  replacement per hospitalist  Trend renal panel electrolytes.  Replace potassium orally and magnesium IV  Carb consistent diet and appreciate diabetic educator input  New type 2 diabetes diagnosis and will likely need insulin at discharge  CT scan of the abdomen pelvis does have mural thickening concern for gastroesophagitis, defer to primary   Continue Pradaxa and flecainide for atrial fibrillation  Continue statin  Encourage activity and incentive spirometer use     DVT prophylaxis:  Medical DVT prophylaxis orders are present.    CODE STATUS:   Code Status (Patient has no pulse and is not breathing): CPR (Attempt to Resuscitate)  Medical Interventions (Patient has pulse or is breathing): Full Support    Labs, imaging, microbiology, notes and medications personally reviewed  Discussed with primary    I will sign off.  Please call with questions.    Electronically signed by Matheus Hicks MD, 04/28/24, 12:19 PM EDT.

## 2024-04-30 LAB
BACTERIA SPEC AEROBE CULT: NORMAL
BACTERIA SPEC AEROBE CULT: NORMAL

## 2024-05-09 ENCOUNTER — READMISSION MANAGEMENT (OUTPATIENT)
Dept: CALL CENTER | Facility: HOSPITAL | Age: 54
End: 2024-05-09
Payer: OTHER GOVERNMENT

## 2024-05-28 ENCOUNTER — TELEPHONE (OUTPATIENT)
Dept: GASTROENTEROLOGY | Facility: CLINIC | Age: 54
End: 2024-05-28
Payer: OTHER GOVERNMENT

## 2024-05-28 NOTE — TELEPHONE ENCOUNTER
Spoke with patient regarding rescheduling her appointment on 7/10/24 due to Hawa Bryant being out of the office. She has rescheduled her appointment to 6/11@3:00

## 2024-06-11 ENCOUNTER — OFFICE VISIT (OUTPATIENT)
Dept: GASTROENTEROLOGY | Facility: CLINIC | Age: 54
End: 2024-06-11
Payer: OTHER GOVERNMENT

## 2024-06-11 VITALS
SYSTOLIC BLOOD PRESSURE: 134 MMHG | DIASTOLIC BLOOD PRESSURE: 78 MMHG | WEIGHT: 258 LBS | BODY MASS INDEX: 36.12 KG/M2 | HEART RATE: 79 BPM | HEIGHT: 71 IN

## 2024-06-11 DIAGNOSIS — Z12.12 SCREENING FOR COLORECTAL CANCER: ICD-10-CM

## 2024-06-11 DIAGNOSIS — Z12.11 SCREENING FOR COLORECTAL CANCER: ICD-10-CM

## 2024-06-11 DIAGNOSIS — R12 HEARTBURN: Primary | ICD-10-CM

## 2024-06-11 DIAGNOSIS — R93.3 IMAGING OF GASTROINTESTINAL TRACT ABNORMAL: ICD-10-CM

## 2024-06-11 RX ORDER — PEG-3350, SODIUM SULFATE, SODIUM CHLORIDE, POTASSIUM CHLORIDE, SODIUM ASCORBATE AND ASCORBIC ACID 7.5-2.691G
1000 KIT ORAL EVERY 12 HOURS
Qty: 1000 ML | Refills: 0 | Status: SHIPPED | OUTPATIENT
Start: 2024-06-11

## 2024-06-11 RX ORDER — FAMOTIDINE 20 MG/1
20 TABLET, FILM COATED ORAL
COMMUNITY
Start: 2024-05-28 | End: 2025-05-28

## 2024-06-11 RX ORDER — TADALAFIL 20 MG/1
10-20 TABLET ORAL
COMMUNITY
Start: 2024-05-28 | End: 2025-05-28

## 2024-06-11 NOTE — PROGRESS NOTES
Chief Complaint     Heartburn (GERD)    History of Present Illness     Tushar Castillo is a 53 y.o. male who presents to [facility] on referral from Jamie Pena MD for a gastroenterology evaluation of GERD.      He reports being hospitalized in April for uncontrolled diabetes.  He was experiencing severe heartburn.  Now that glucose is controlled, heartburn is better.  He is only taking pepcid as needed.  Denies previous EGD.  Denies dysphagia and abdominal pain.      Reports a regular bowel pattern.  Previous colonoscopy in 2020 per Dr. Ferraro.      History      Past Medical History:   Diagnosis Date    Acute medial meniscus tear of left knee 02/15/2018    Arthritis     Atrial fibrillation     Diabetes mellitus     Hyperlipemia     Hypertension     Limb swelling     Primary osteoarthritis of left knee 02/14/2018    Seasonal allergies     Sleep apnea        Past Surgical History:   Procedure Laterality Date    COLONOSCOPY  2013    INTRAOCULAR LENS INSERTION      LASIK  2005       Family History   Problem Relation Age of Onset    Diabetes Mother     Arthritis Mother     Cancer Father         Current Medications        Current Outpatient Medications:     dabigatran etexilate (PRADAXA) 150 MG capsu, Take 1 capsule by mouth 2 (Two) Times a Day., Disp: , Rfl:     empagliflozin (JARDIANCE) 25 MG tablet tablet, Take 1 tablet by mouth Daily., Disp: , Rfl:     ergocalciferol (ERGOCALCIFEROL) 1.25 MG (70407 UT) capsule, Take 1 capsule by mouth 1 (One) Time Per Week., Disp: , Rfl:     famotidine (PEPCID) 20 MG tablet, Take 1 tablet by mouth., Disp: , Rfl:     flecainide (TAMBOCOR) 50 MG tablet, Take 1 tablet by mouth 2 (Two) Times a Day., Disp: 180 tablet, Rfl: 3    Insulin Pen Needle 31G X 6 MM misc, Inject 1 each under the skin into the appropriate area as directed 3 (Three) Times a Day Before Meals., Disp: 100 each, Rfl: 0    metoprolol succinate XL (TOPROL-XL) 100 MG 24 hr tablet, Take  1 tablet by mouth 2 (Two) Times a Day., Disp: , Rfl:     mirtazapine (REMERON) 30 MG tablet, Take 1 tablet by mouth Every Night., Disp: , Rfl:     multivitamin with minerals tablet tablet, Take 1 tablet by mouth Daily., Disp: , Rfl:     OneTouch Delica Lancets 33G misc, 1 each by Other route 3 (Three) Times a Day Before Meals., Disp: 100 each, Rfl: 0    rosuvastatin (CRESTOR) 40 MG tablet, Take 1 tablet by mouth Daily., Disp: , Rfl:     tadalafil (CIALIS) 20 MG tablet, Take 10-20 mg by mouth., Disp: , Rfl:     Insulin Glargine (LANTUS SOLOSTAR) 100 UNIT/ML injection pen, Inject 45 Units under the skin into the appropriate area as directed 2 (Two) Times a Day for 30 days., Disp: 27 mL, Rfl: 0    Insulin Lispro, 1 Unit Dial, (HumaLOG KwikPen) 100 UNIT/ML solution pen-injector, Inject 4-14 Units under the skin into the appropriate area as directed 3 (Three) Times a Day Before Meals for 30 days. Blood glucose 150-199 mg/dL - 4 units Blood glucose 200-249 mg/dL - 6 units Blood glucose 250-299 mg/dL - 8 units Blood glucose 300-349 mg/dL - 10 units Blood glucose 350-400 mg/dL - 12 units Blood glucose greater than 400 mg/dL - 14 units, Disp: 13 mL, Rfl: 1    metFORMIN ER (GLUCOPHAGE-XR) 500 MG 24 hr tablet, Take 2 tablets by mouth Daily With Breakfast for 30 days., Disp: 60 tablet, Rfl: 0    PEG-KCl-NaCl-NaSulf-Na Asc-C (MoviPrep) 100 g reconstituted solution powder, Take 1,000 mL by mouth Every 12 (Twelve) Hours., Disp: 1000 mL, Rfl: 0     Allergies     Allergies   Allergen Reactions    Compazine [Prochlorperazine] Paresthesia     Jaws       Social History       Social History     Social History Narrative    Not on file       Immunizations     Immunization:  Immunization History   Administered Date(s) Administered    COVID-19 F23 (PFIZER) 12YRS+ (COMIRNATY) 11/29/2023          Objective     Objective     Vital Signs:   /78 (BP Location: Left arm, Patient Position: Sitting, Cuff Size: Adult)   Pulse 79   Ht 180.3 cm  "(71\")   Wt 117 kg (258 lb)   BMI 35.98 kg/m²       Physical Exam    Results      Result Review :   The following data was reviewed by: TORIN Andres on 06/11/2024:    CBC w/diff          4/26/2024    01:52 4/27/2024    04:01 4/28/2024    05:07   CBC w/Diff   WBC 15.83  9.60  6.94    RBC 5.36  4.20  4.23    Hemoglobin 15.2  12.0  12.0    Hematocrit 46.7  35.3  34.9    MCV 87.1  84.0  82.5    MCH 28.4  28.6  28.4    MCHC 32.5  34.0  34.4    RDW 14.0  13.6  13.6    Platelets 235  172  153    Neutrophil Rel % 75.1  47.0  44.0    Immature Granulocyte Rel % 0.4  0.3  0.1    Lymphocyte Rel % 16.0  37.2  37.0    Monocyte Rel % 8.2  12.8  14.0    Eosinophil Rel % 0.0  2.3  4.5    Basophil Rel % 0.3  0.4  0.4      CMP          4/26/2024    01:52 4/26/2024    04:05 4/26/2024    07:42 4/26/2024    11:55 4/26/2024    16:05 4/26/2024    19:53 4/27/2024    00:02 4/27/2024    04:01 4/27/2024    07:55   CMP   Glucose 284  243  259  201  182  128  147  106  160    BUN 6  5  5  5  4  4  3  3  3    Creatinine 1.17  1.21  1.14  1.07  1.03  0.94  0.87  0.96  0.88    EGFR 74.1  71.2  76.4  82.5  86.3  96.3  102.5  93.9  102.2    Sodium 135  136  137  135  134  135  135  136  136    Potassium 5.2  4.5  4.4  3.7  3.2  3.5  3.4  3.6  3.3    Chloride 100  102  103  104  103  104  105  106  107    Calcium 9.4  9.2  8.9  8.7  9.3  9.0  8.8  8.8  8.4    Total Protein 9.4  8.8           Albumin 4.3  4.2           Globulin 5.1  4.6           Total Bilirubin 0.2  0.2           Alkaline Phosphatase 128  121           AST (SGOT) 22  19           ALT (SGPT) 23  22           Albumin/Globulin Ratio 0.8  0.9           BUN/Creatinine Ratio 5.1  4.1  4.4  4.7  3.9  4.3  3.4  3.1  3.4    Anion Gap 29.9  27.2  24.8  16.0  15.0  13.6  13.7  12.8  12.4          4/28/2024    05:07   CMP   Glucose 281    BUN 3    Creatinine 0.81    EGFR 104.8    Sodium 137    Potassium 3.4    Chloride 106    Calcium 9.0    Total Protein    Albumin    Globulin  "   Total Bilirubin    Alkaline Phosphatase    AST (SGOT)    ALT (SGPT)    Albumin/Globulin Ratio    BUN/Creatinine Ratio 3.7    Anion Gap 13.5      CT Abdomen Pelvis With Contrast (04/25/2024 23:45)   Small hiatal hernia with circumferential mural thickening.  Age-indeterminate gastroesophagitis cannot be excluded.  Consider EGD.             Assessment and Plan        Assessment and Plan    Diagnoses and all orders for this visit:    1. Heartburn (Primary)  -     Case Request; Standing  -     Case Request    2. Screening for colorectal cancer  -     Case Request; Standing  -     Case Request    3. Imaging of gastrointestinal tract abnormal    Other orders  -     Follow Anesthesia Guidelines / Protocol; Future  -     Verify NPO; Standing  -     Verify Bowel Prep Was Successful; Standing  -     Give Tap Water Enema If Bowel Prep Insufficient; Standing  -     Obtain Informed Consent; Standing  -     PEG-KCl-NaCl-NaSulf-Na Asc-C (MoviPrep) 100 g reconstituted solution powder; Take 1,000 mL by mouth Every 12 (Twelve) Hours.  Dispense: 1000 mL; Refill: 0        ESOPHAGOGASTRODUODENOSCOPY (N/A), COLONOSCOPY FOR SCREENING (N/A)  The risk of the endoscopy were discussed in detail. Possible risks/complications, benefits, and alternatives to surgical or invasive procedure have been explained to patient and/or legal guardian; risks include bleeding, infection, and perforation. Patient has been evaluated and can tolerate anesthesia and/or sedation.       Follow Up        Follow Up   Return if symptoms worsen or fail to improve.  Patient was given instructions and counseling regarding his condition or for health maintenance advice. Please see specific information pulled into the AVS if appropriate.

## 2024-06-19 ENCOUNTER — PATIENT ROUNDING (BHMG ONLY) (OUTPATIENT)
Dept: GASTROENTEROLOGY | Facility: CLINIC | Age: 54
End: 2024-06-19
Payer: OTHER GOVERNMENT

## 2024-06-19 NOTE — PROGRESS NOTES
"6/19/2024      Hello, may I speak with Tushar Castillo     My name is Evelyn. I am calling from [facility]. I show that you had a recent visit with TORIN Lizama.    Before we get started may I verify your date of birth? 1970    I am calling to officially welcome you to our practice and ask about your recent visit. Is this a good time to talk? Yes     Tell me about your visit with us. What things went well? \"Everything was good, Alla explained everything well and I'm scheduled for scope\"     We strive to ensure that we protect your safety and privacy. Is there anything we could have done to improve this during your visit?   No     We're always looking for ways to make our patients' experiences even better. Do you have recommendations on ways we may improve?  No     Overall were you satisfied with your first visit to our practice?  Yes     I appreciate you taking the time to speak with me today. Is there anything else I can do for you?  No    I am glad to hear that you had a very good visit and I appreciate you taking the time to provide feedback on this call. We would greatly appreciate you filling out a survey if you receive one in the mail, email or text. This is a great opportunity to provide any additional feedback that you may think of after this call as well.       Thank you, and have a great day.   "

## 2024-08-07 ENCOUNTER — OFFICE VISIT (OUTPATIENT)
Dept: CARDIOLOGY | Facility: CLINIC | Age: 54
End: 2024-08-07
Payer: OTHER GOVERNMENT

## 2024-08-07 VITALS
HEIGHT: 71 IN | BODY MASS INDEX: 39.28 KG/M2 | WEIGHT: 280.6 LBS | HEART RATE: 87 BPM | DIASTOLIC BLOOD PRESSURE: 68 MMHG | SYSTOLIC BLOOD PRESSURE: 106 MMHG

## 2024-08-07 DIAGNOSIS — E87.6 HYPOKALEMIA: ICD-10-CM

## 2024-08-07 DIAGNOSIS — I48.0 PAROXYSMAL ATRIAL FIBRILLATION: Primary | ICD-10-CM

## 2024-08-07 DIAGNOSIS — I10 ESSENTIAL HYPERTENSION: ICD-10-CM

## 2024-08-07 PROBLEM — R11.2 NAUSEA AND VOMITING: Status: RESOLVED | Noted: 2024-04-26 | Resolved: 2024-08-07

## 2024-08-07 PROBLEM — E11.10 TYPE 2 DIABETES MELLITUS WITH KETOACIDOSIS WITHOUT COMA, WITH LONG-TERM CURRENT USE OF INSULIN: Status: ACTIVE | Noted: 2024-05-28

## 2024-08-07 PROBLEM — Z79.4 TYPE 2 DIABETES MELLITUS WITH KETOACIDOSIS WITHOUT COMA, WITH LONG-TERM CURRENT USE OF INSULIN: Status: ACTIVE | Noted: 2024-05-28

## 2024-08-07 PROCEDURE — 99214 OFFICE O/P EST MOD 30 MIN: CPT | Performed by: NURSE PRACTITIONER

## 2024-08-07 PROCEDURE — 93000 ELECTROCARDIOGRAM COMPLETE: CPT | Performed by: NURSE PRACTITIONER

## 2024-08-07 RX ORDER — BLOOD-GLUCOSE SENSOR
EACH MISCELLANEOUS
COMMUNITY
Start: 2024-07-30

## 2024-08-07 RX ORDER — FEXOFENADINE HCL 180 MG/1
TABLET ORAL
COMMUNITY
Start: 2024-07-30

## 2024-08-07 RX ORDER — HYDROCHLOROTHIAZIDE 25 MG/1
TABLET ORAL
COMMUNITY
Start: 2024-07-30

## 2024-08-07 RX ORDER — FLECAINIDE ACETATE 50 MG/1
50 TABLET ORAL 2 TIMES DAILY
Qty: 180 TABLET | Refills: 3 | Status: SHIPPED | OUTPATIENT
Start: 2024-08-07

## 2024-08-07 NOTE — PROGRESS NOTES
Chief Complaint  Paroxysmal atrial fibrillation    Subjective            History of Present Illness  Tsuhar Castillo is a 53-year-old male patient who presents to the office today for follow-up.  He has atrial fibrillation and hypertension.  He is compliant with medication.  He denies any new or worsening cardiac symptoms today.  He brought in blood pressure log from home which shows controlled blood pressures.    PMH  Past Medical History:   Diagnosis Date    Acute medial meniscus tear of left knee 02/15/2018    Arthritis     Atrial fibrillation     Diabetes mellitus     Hyperlipemia     Hypertension     Limb swelling     Primary osteoarthritis of left knee 02/14/2018    Seasonal allergies     Sleep apnea          ALLERGY  Allergies   Allergen Reactions    Compazine [Prochlorperazine] Paresthesia     Jaws          SURGICALHX  Past Surgical History:   Procedure Laterality Date    COLONOSCOPY  2013    INTRAOCULAR LENS INSERTION      LASIK  2005          SOC  Social History     Socioeconomic History    Marital status:    Tobacco Use    Smoking status: Never    Smokeless tobacco: Never   Vaping Use    Vaping status: Never Used   Substance and Sexual Activity    Alcohol use: Yes     Comment: LIGHT    Drug use: Never    Sexual activity: Defer         FAMHX  Family History   Problem Relation Age of Onset    Diabetes Mother     Arthritis Mother     Cancer Father           MEDSIGONLY  Current Outpatient Medications on File Prior to Visit   Medication Sig    Continuous Glucose Sensor (FreeStyle David 3 Sensor) misc     dabigatran etexilate (PRADAXA) 150 MG capsu Take 1 capsule by mouth 2 (Two) Times a Day.    Dulaglutide 0.75 MG/0.5ML solution pen-injector Inject 0.75 mg under the skin into the appropriate area as directed.    empagliflozin (JARDIANCE) 25 MG tablet tablet Take 1 tablet by mouth Daily.    ergocalciferol (ERGOCALCIFEROL) 1.25 MG (93899 UT) capsule Take 1 capsule by mouth 1 (One) Time Per Week.     "famotidine (PEPCID) 20 MG tablet Take 1 tablet by mouth.    fexofenadine (ALLEGRA) 180 MG tablet     flecainide (TAMBOCOR) 50 MG tablet Take 1 tablet by mouth 2 (Two) Times a Day.    glucose blood test strip 1 each by Other route Daily.    hydroCHLOROthiazide 25 MG tablet     Insulin Glargine (LANTUS SOLOSTAR) 100 UNIT/ML injection pen Inject 45 Units under the skin into the appropriate area as directed 2 (Two) Times a Day for 30 days.    Insulin Lispro, 1 Unit Dial, (HumaLOG KwikPen) 100 UNIT/ML solution pen-injector Inject 4-14 Units under the skin into the appropriate area as directed 3 (Three) Times a Day Before Meals for 30 days. Blood glucose 150-199 mg/dL - 4 units Blood glucose 200-249 mg/dL - 6 units Blood glucose 250-299 mg/dL - 8 units Blood glucose 300-349 mg/dL - 10 units Blood glucose 350-400 mg/dL - 12 units Blood glucose greater than 400 mg/dL - 14 units    Insulin Pen Needle 31G X 6 MM misc Inject 1 each under the skin into the appropriate area as directed 3 (Three) Times a Day Before Meals.    metFORMIN ER (GLUCOPHAGE-XR) 500 MG 24 hr tablet Take 2 tablets by mouth Daily With Breakfast for 30 days.    metoprolol succinate XL (TOPROL-XL) 100 MG 24 hr tablet Take 1 tablet by mouth 2 (Two) Times a Day.    mirtazapine (REMERON) 30 MG tablet Take 1 tablet by mouth Every Night.    multivitamin with minerals tablet tablet Take 1 tablet by mouth Daily.    OneTouch Delica Lancets 33G misc 1 each by Other route 3 (Three) Times a Day Before Meals.    rosuvastatin (CRESTOR) 40 MG tablet Take 1 tablet by mouth Daily.    tadalafil (CIALIS) 20 MG tablet Take 10-20 mg by mouth.    PEG-KCl-NaCl-NaSulf-Na Asc-C (MoviPrep) 100 g reconstituted solution powder Take 1,000 mL by mouth Every 12 (Twelve) Hours. (Patient not taking: Reported on 8/7/2024)     No current facility-administered medications on file prior to visit.         Objective   /68   Pulse 87   Ht 180.3 cm (70.98\")   Wt 127 kg (280 lb 9.6 oz)   " BMI 39.15 kg/m²       Physical Exam  Constitutional:       Appearance: He is obese.   HENT:      Head: Normocephalic.   Neck:      Vascular: No carotid bruit.   Cardiovascular:      Rate and Rhythm: Normal rate and regular rhythm.      Pulses: Normal pulses.      Heart sounds: Normal heart sounds. No murmur heard.  Pulmonary:      Effort: Pulmonary effort is normal.      Breath sounds: Normal breath sounds.   Musculoskeletal:      Cervical back: Neck supple.      Right lower leg: No edema.      Left lower leg: No edema.   Skin:     General: Skin is dry.   Neurological:      Mental Status: He is alert and oriented to person, place, and time.   Psychiatric:         Behavior: Behavior normal.       ECG 12 Lead    Date/Time: 8/7/2024 2:46 PM  Performed by: Em Monroe APRN    Authorized by: Em Monroe APRN  Comparison: compared with previous ECG from 4/25/2024  Comparison to previous ECG: Improved rate  Rhythm: sinus rhythm  Rate: normal  BPM: 84  Conduction: conduction normal  T Waves: T waves normal  QRS axis: normal  Other findings: non-specific ST-T wave changes    Clinical impression: non-specific ECG      Result Review :   The following data was reviewed by: TORIN Pulido on 08/07/2024:  proBNP   Date Value Ref Range Status   04/25/2024 59.2 0.0 - 900.0 pg/mL Final     CMP          4/26/2024    07:42 4/26/2024    11:55 4/26/2024    16:05 4/26/2024    19:53 4/27/2024    00:02 4/27/2024    04:01 4/27/2024    07:55   CMP   Glucose 259  201  182  128  147  106  160    BUN 5  5  4  4  3  3  3    Creatinine 1.14  1.07  1.03  0.94  0.87  0.96  0.88    EGFR 76.4  82.5  86.3  96.3  102.5  93.9  102.2    Sodium 137  135  134  135  135  136  136    Potassium 4.4  3.7  3.2  3.5  3.4  3.6  3.3    Chloride 103  104  103  104  105  106  107    Calcium 8.9  8.7  9.3  9.0  8.8  8.8  8.4    Total Protein          Albumin          Globulin          Total Bilirubin          Alkaline Phosphatase         "  AST (SGOT)          ALT (SGPT)          Albumin/Globulin Ratio          BUN/Creatinine Ratio 4.4  4.7  3.9  4.3  3.4  3.1  3.4    Anion Gap 24.8  16.0  15.0  13.6  13.7  12.8  12.4          4/28/2024    05:07   CBC w/Diff   WBC 6.94    RBC 4.23    Hemoglobin 12.0    Hematocrit 34.9    MCV 82.5    MCH 28.4    MCHC 34.4    RDW 13.6    Platelets 153    Neutrophil Rel % 44.0    Immature Granulocyte Rel % 0.1    Lymphocyte Rel % 37.0    Monocyte Rel % 14.0    Eosinophil Rel % 4.5    Basophil Rel % 0.4       No results found for: \"TSH\"   No results found for: \"FREET4\"   No results found for: \"DDIMERQUANT\"  Magnesium   Date Value Ref Range Status   04/28/2024 1.9 1.6 - 2.6 mg/dL Final      No results found for: \"DIGOXIN\"   Lab Results   Component Value Date    TROPONINT 17 04/25/2024             [id number] Score: 2          Assessment and Plan    Diagnoses and all orders for this visit:    1. Paroxysmal atrial fibrillation (Primary)  Symptomatically stable at this time, EKG in office today shows normal sinus rhythm with controlled rate.  Continue flecainide 50 mg twice daily and metoprolol 100 mg twice daily.  Continue Pradaxa for CVA prevention.    2. Essential hypertension  Currently controlled and without adverse effects from medication, continue hydrochlorothiazide 25 mg daily.    3. Hypokalemia  He was hospitalized back in April and had trouble with abnormal potassium levels, recommend checking BMP to reassess potassium to make sure he does not need to be on a supplement.  -     Basic Metabolic Panel; Future    Other orders  -     flecainide (TAMBOCOR) 50 MG tablet; Take 1 tablet by mouth 2 (Two) Times a Day.  Dispense: 180 tablet; Refill: 3  -     ECG 12 Lead            Follow Up   Return in about 6 months (around 2/7/2025) for Follow up with Dr Thayer.    Patient was given instructions and counseling regarding his condition or for health maintenance advice. Please see specific information pulled into the AVS if " appropriate.     Tushar Castillo  reports that he has never smoked. He has never used smokeless tobacco.            Em Monroe, APRN  08/07/24  14:56 EDT    Dictated Utilizing Dragon Dictation

## 2024-12-12 ENCOUNTER — TELEPHONE (OUTPATIENT)
Dept: GASTROENTEROLOGY | Facility: CLINIC | Age: 54
End: 2024-12-12
Payer: OTHER GOVERNMENT

## 2024-12-12 NOTE — TELEPHONE ENCOUNTER
SW pt about arrival time and bowel prep instructions for upcoming ESOPHAGOGASTRODUODENOSCOPY on 1/06.  Pt voiced understanding.

## 2024-12-26 NOTE — PRE-PROCEDURE INSTRUCTIONS
Reminded of arrival time at 0600, Entrance C of the [facility] hospital.   Instructed to bring picture ID and Insurance Card. Have a  over the age of 18 to drive you home the day of procedure.      Clear liquid diet the day before the procedure, nothing red or purple. Call MD office with any questions about the prep or if in need of the prep    Nothing by mouth after midnight the night before the procedure or the AM of the procedure with the exception of your bowel prep and medications with a sip of water. Bowel Prep and medication must be completed or taken 2 hours prior to arrival time.    Meds to take AM of Procedure- metoprolol  Hold trulicity-   last possible dose 12/29/25

## 2025-01-03 ENCOUNTER — ANESTHESIA EVENT (OUTPATIENT)
Dept: GASTROENTEROLOGY | Facility: HOSPITAL | Age: 55
End: 2025-01-03
Payer: OTHER GOVERNMENT

## 2025-01-03 NOTE — ANESTHESIA PREPROCEDURE EVALUATION
" Anesthesia Evaluation     Nursing notes reviewed   NPO Solid Status: > 8 hours  NPO Liquid Status: > 2 hours           Airway   Mallampati: I  TM distance: >3 FB  Neck ROM: full  No difficulty expected  Dental - normal exam     Pulmonary - normal exam   (+) ,sleep apnea on CPAP  Cardiovascular - normal exam    PT is on anticoagulation therapy  Patient on routine beta blocker  Rhythm: regular  Rate: normal    (+) hypertension, dysrhythmias Paroxysmal Atrial Fib, hyperlipidemia      Neuro/Psych  GI/Hepatic/Renal/Endo    (+) diabetes mellitus type 2 using insulin    Musculoskeletal         ROS comment:   Acute medial meniscus tear of left knee   Primary osteoarthritis of left knee  Abdominal   (+) obese   Substance History      OB/GYN      Comment: N/a       Other   arthritis,     ROS/Med Hx Other: Called pt and spoke with him 1030 on 1/3/25. Reviewed meds with pt as follows.   Insulin - \"I take the Humalog only in the morning after I check my blood sugar\". Confirmed with pt he will check his BG Sunday am and hold if BG <150 at that time.   Pradaxa - last dose - Sunday 1/5/25  Metoprolol - last dose \"I take it once a day and I will take it the day before my procedure\".   Dulaglutide (Trulicity) - \"I took the last dose I had a couple of weeks ago and do not have more at the moment\" confirmed 12/18/24 last dose                     Anesthesia Plan    ASA 3     general     (Total IV Anesthesia    Patient understands anesthesia not responsible for dental damage.    Discussed risks with pt including aspiration, allergic reactions, apnea, advanced airway placement. Pt verbalized understanding. All questions answered. )  intravenous induction     Anesthetic plan, risks, benefits, and alternatives have been provided, discussed and informed consent has been obtained with: patient.    Plan discussed with CRNA.      CODE STATUS:         "

## 2025-01-06 ENCOUNTER — ANESTHESIA (OUTPATIENT)
Dept: GASTROENTEROLOGY | Facility: HOSPITAL | Age: 55
End: 2025-01-06
Payer: OTHER GOVERNMENT

## 2025-01-06 ENCOUNTER — HOSPITAL ENCOUNTER (OUTPATIENT)
Facility: HOSPITAL | Age: 55
Setting detail: HOSPITAL OUTPATIENT SURGERY
Discharge: HOME | End: 2025-01-06
Attending: INTERNAL MEDICINE | Admitting: INTERNAL MEDICINE
Payer: OTHER GOVERNMENT

## 2025-01-06 ENCOUNTER — TELEPHONE (OUTPATIENT)
Dept: GASTROENTEROLOGY | Facility: CLINIC | Age: 55
End: 2025-01-06
Payer: OTHER GOVERNMENT

## 2025-01-06 VITALS
HEART RATE: 91 BPM | OXYGEN SATURATION: 99 % | RESPIRATION RATE: 18 BRPM | WEIGHT: 295.42 LBS | SYSTOLIC BLOOD PRESSURE: 141 MMHG | BODY MASS INDEX: 41.22 KG/M2 | TEMPERATURE: 97.6 F | DIASTOLIC BLOOD PRESSURE: 100 MMHG

## 2025-01-06 LAB — GLUCOSE BLDC GLUCOMTR-MCNC: 137 MG/DL (ref 70–99)

## 2025-01-06 PROCEDURE — 82948 REAGENT STRIP/BLOOD GLUCOSE: CPT | Performed by: INTERNAL MEDICINE

## 2025-01-06 PROCEDURE — G0463 HOSPITAL OUTPT CLINIC VISIT: HCPCS | Performed by: INTERNAL MEDICINE

## 2025-01-06 RX ORDER — SODIUM CHLORIDE 9 MG/ML
100 INJECTION, SOLUTION INTRAVENOUS CONTINUOUS
Status: DISCONTINUED | OUTPATIENT
Start: 2025-01-06 | End: 2025-01-06 | Stop reason: HOSPADM

## 2025-01-06 NOTE — H&P
Pre Procedure History & Physical    Chief Complaint:   GERD, screening colonoscopy    Subjective     HPI:   55 yo M here for eval of GERD, screening colonoscopy.    Past Medical History:   Past Medical History:   Diagnosis Date    Acute medial meniscus tear of left knee 02/15/2018    Arthritis     Atrial fibrillation     Diabetes mellitus     Hyperlipemia     Hypertension     Limb swelling     Primary osteoarthritis of left knee 02/14/2018    Seasonal allergies     Sleep apnea        Past Surgical History:  Past Surgical History:   Procedure Laterality Date    COLONOSCOPY  2013    INTRAOCULAR LENS INSERTION      LASIK  2005       Family History:  Family History   Problem Relation Age of Onset    Diabetes Mother     Arthritis Mother     Cancer Father        Social History:   reports that he has never smoked. He has never used smokeless tobacco. He reports current alcohol use. He reports that he does not use drugs.    Medications:   Medications Prior to Admission   Medication Sig Dispense Refill Last Dose/Taking    Continuous Glucose Sensor (FreeStyle David 3 Sensor) misc        dabigatran etexilate (PRADAXA) 150 MG capsu Take 1 capsule by mouth 2 (Two) Times a Day.       Dulaglutide 0.75 MG/0.5ML solution pen-injector Inject 0.75 mg under the skin into the appropriate area as directed.   12/18/2024    empagliflozin (JARDIANCE) 25 MG tablet tablet Take 1 tablet by mouth Daily.       ergocalciferol (ERGOCALCIFEROL) 1.25 MG (08554 UT) capsule Take 1 capsule by mouth 1 (One) Time Per Week.       famotidine (PEPCID) 20 MG tablet Take 1 tablet by mouth.       fexofenadine (ALLEGRA) 180 MG tablet        flecainide (TAMBOCOR) 50 MG tablet Take 1 tablet by mouth 2 (Two) Times a Day. 180 tablet 3     glucose blood test strip 1 each by Other route Daily.       hydroCHLOROthiazide 25 MG tablet        Insulin Glargine (LANTUS SOLOSTAR) 100 UNIT/ML injection pen Inject 45 Units under the skin into the appropriate area as directed  2 (Two) Times a Day for 30 days. 27 mL 0     Insulin Lispro, 1 Unit Dial, (HumaLOG KwikPen) 100 UNIT/ML solution pen-injector Inject 4-14 Units under the skin into the appropriate area as directed 3 (Three) Times a Day Before Meals for 30 days. Blood glucose 150-199 mg/dL - 4 units Blood glucose 200-249 mg/dL - 6 units Blood glucose 250-299 mg/dL - 8 units Blood glucose 300-349 mg/dL - 10 units Blood glucose 350-400 mg/dL - 12 units Blood glucose greater than 400 mg/dL - 14 units 13 mL 1     Insulin Pen Needle 31G X 6 MM misc Inject 1 each under the skin into the appropriate area as directed 3 (Three) Times a Day Before Meals. 100 each 0     metFORMIN ER (GLUCOPHAGE-XR) 500 MG 24 hr tablet Take 2 tablets by mouth Daily With Breakfast for 30 days. 60 tablet 0     metoprolol succinate XL (TOPROL-XL) 100 MG 24 hr tablet Take 1 tablet by mouth 2 (Two) Times a Day.       mirtazapine (REMERON) 30 MG tablet Take 1 tablet by mouth Every Night.       multivitamin with minerals tablet tablet Take 1 tablet by mouth Daily.       OneTouch Delica Lancets 33G misc 1 each by Other route 3 (Three) Times a Day Before Meals. 100 each 0     rosuvastatin (CRESTOR) 40 MG tablet Take 1 tablet by mouth Daily.       tadalafil (CIALIS) 20 MG tablet Take 10-20 mg by mouth.          Allergies:  Compazine [prochlorperazine]    ROS:    Pertinent items are noted in HPI     Objective     Weight 134 kg (295 lb 6.7 oz).    Physical Exam   Constitutional: Pt is oriented to person, place, and time and well-developed, well-nourished, and in no distress.   Mouth/Throat: Oropharynx is clear and moist.   Neck: Normal range of motion.   Cardiovascular: Normal rate, regular rhythm and normal heart sounds.    Pulmonary/Chest: Effort normal and breath sounds normal.   Abdominal: Soft. Nontender  Skin: Skin is warm and dry.   Psychiatric: Mood, memory, affect and judgment normal.     Assessment & Plan     Diagnosis:  GERD, screening colonoscopy    Anticipated  Surgical Procedure:  EGD/colonoscopy    The risks, benefits, and alternatives of this procedure have been discussed with the patient or the responsible party- the patient understands and agrees to proceed.

## 2025-01-06 NOTE — TELEPHONE ENCOUNTER
Pt presented today for EGD/colonoscopy but was not advised to hold Pradaxa.  Please obtain clearance recommendations for holding medication.  He reports he follows with Dr. Thayer.    Please also provide low volume bowel prep sample if patient would prefer.  I also advised patient that he could be scheduled on whatever day and time was convening for him.  OK to overbook if needed.  [facility] can assist if needed.  Thanks

## 2025-01-07 ENCOUNTER — TELEPHONE (OUTPATIENT)
Dept: GASTROENTEROLOGY | Facility: CLINIC | Age: 55
End: 2025-01-07
Payer: OTHER GOVERNMENT

## 2025-01-07 ENCOUNTER — PREP FOR SURGERY (OUTPATIENT)
Dept: OTHER | Facility: HOSPITAL | Age: 55
End: 2025-01-07
Payer: OTHER GOVERNMENT

## 2025-01-07 DIAGNOSIS — Z12.12 SCREENING FOR COLORECTAL CANCER: ICD-10-CM

## 2025-01-07 DIAGNOSIS — Z12.11 SCREENING FOR COLORECTAL CANCER: ICD-10-CM

## 2025-01-07 DIAGNOSIS — R12 HEARTBURN: Primary | ICD-10-CM

## 2025-01-07 NOTE — TELEPHONE ENCOUNTER
S/w patient  Patient R/S to 01/24/2025 at 12:30  Patient is aware to stop by office to  bowel prep sample. (Suflave)   Clearances sent.

## 2025-01-07 NOTE — TELEPHONE ENCOUNTER
1/7/2025    Dear Dr. Thayer,     Patient: Tushar Castillo   YOB: 1970        This patient is waiting to have a Colonoscopy and/or Esophagogastroduodenoscopy which I will perform at [facility] on 01/24/2025.  Our records indicate this patient is currently taking pradaxa. This procedure requires the patient to suspend their anticoagulant medication prior to surgery.     Please respond to this request noting your recommendations. You may contact our office at [phone] Option 3 with any questions. I appreciate your prompt response in this matter.     Please return this form to our office no later than two weeks prior to the procedure date listed above. Please return form to [phone]. Please inform our office if the patient requires additional follow-up from your office prior to scheduled procedure date.     ____ I approve my patient from a Cardiac  standpoint     ____ I do NOT approve my patient from a Cardiac  standpoint at this time    ____ I approve my patient to stop taking their Anticoagulant Therapy medication 3 days prior to the scheduled procedure.    ____ I do NOT approve my patient to stop taking their Anticoagulant Therapy medication at this time.      Please specify clearance expiration date:_____________________________    Approving physician name (please print):     _____________________________________________    Approving physician signature:     ________________________________    Date:________________        Sincerely,  [facility]   Gastroenterology -

## 2025-01-07 NOTE — TELEPHONE ENCOUNTER
Procedure: Colonoscopy and/or EGD     Med Directive: Pradaxa     PMH: PAF, HTN     Last Seen: 8/7/2024

## 2025-01-17 NOTE — PRE-PROCEDURE INSTRUCTIONS
"         Instructed on date and arrival time of 0700 Instructed that could be here 3-4 hour.  Come to entrance \"C\".  Must have  over age [address] to drive home.  May have two visitors; however, children under 12 must stay in waiting room.  Discussed diet/NPO, bowel prep.  May take medications as usual except for blood thinners, diabetic medications, or weight loss medications.  Verbalized understanding of instructions given.  Instructed to call for questions or concerns.  Hold pradaxa 3 days, hold dulaglutide after today, hold metform, jardiance and insulin the night before and day of.            "

## 2025-01-23 ENCOUNTER — ANESTHESIA EVENT (OUTPATIENT)
Dept: GASTROENTEROLOGY | Facility: HOSPITAL | Age: 55
End: 2025-01-23
Payer: OTHER GOVERNMENT

## 2025-01-23 ENCOUNTER — TELEPHONE (OUTPATIENT)
Dept: GASTROENTEROLOGY | Facility: CLINIC | Age: 55
End: 2025-01-23
Payer: OTHER GOVERNMENT

## 2025-01-23 NOTE — ANESTHESIA PREPROCEDURE EVALUATION
Anesthesia Evaluation     Patient summary reviewed and Nursing notes reviewed   NPO Solid Status: > 8 hours  NPO Liquid Status: > 4 hours           Airway   Mallampati: I  TM distance: >3 FB  Neck ROM: full  No difficulty expected  Dental - normal exam     Pulmonary - normal exam    breath sounds clear to auscultation  (+) ,sleep apnea on CPAP  Cardiovascular - normal exam  Exercise tolerance: good (4-7 METS)    ECG reviewed  PT is on anticoagulation therapy  Rhythm: regular  Rate: normal    (+) hypertension well controlled 2 medications or greater, dysrhythmias Paroxysmal Atrial Fib, hyperlipidemia      Neuro/Psych  GI/Hepatic/Renal/Endo    (+) obesity, morbid obesity, liver disease fatty liver disease, diabetes mellitus type 2 well controlled    Musculoskeletal     Abdominal    Substance History      OB/GYN          Other   arthritis,     ROS/Med Hx Other: EKG 8/27/24: HR 84, SR  Paroxysmal A fib, follows Dr Thayer    Stress test 07/05/22:   ·Left ventricular ejection fraction is normal. (Calculated EF = 57%).  ·Findings consistent with a normal ECG stress test.  ·Myocardial perfusion imaging indicates a normal myocardial perfusion study with no evidence of ischemia.  ·Impressions are consistent with a low risk study.     Cards clearance with acceptable risks per Dr. Thayer on 01/07/25   Verified with patient that lat dose of Trulicity was > 1 month ago Dec 20, 2025     Last dose Pradaxa 01/19/25                    Anesthesia Plan    ASA 3     general   total IV anesthesia  (Total IV Anesthesia    Patient understands anesthesia not responsible for dental damage.      Discussed risks with pt including aspiration, allergic reactions, apnea, advanced airway placement. Pt verbalized understanding. All questions answered.     )  intravenous induction     Anesthetic plan, risks, benefits, and alternatives have been provided, discussed and informed consent has been obtained with: patient.  Pre-procedure education  provided  Plan discussed with CRNA.      CODE STATUS:

## 2025-01-23 NOTE — TELEPHONE ENCOUNTER
Received VM from patient regarding questions about his upcoming procedure  Returned patients call. No answer, unable to leave VM

## 2025-01-24 ENCOUNTER — ANESTHESIA (OUTPATIENT)
Dept: GASTROENTEROLOGY | Facility: HOSPITAL | Age: 55
End: 2025-01-24
Payer: OTHER GOVERNMENT

## 2025-01-24 ENCOUNTER — HOSPITAL ENCOUNTER (OUTPATIENT)
Facility: HOSPITAL | Age: 55
Setting detail: HOSPITAL OUTPATIENT SURGERY
Discharge: HOME | End: 2025-01-24
Attending: INTERNAL MEDICINE | Admitting: INTERNAL MEDICINE
Payer: OTHER GOVERNMENT

## 2025-01-24 VITALS
RESPIRATION RATE: 21 BRPM | DIASTOLIC BLOOD PRESSURE: 80 MMHG | TEMPERATURE: 97.6 F | HEIGHT: 71 IN | OXYGEN SATURATION: 99 % | SYSTOLIC BLOOD PRESSURE: 126 MMHG | HEART RATE: 80 BPM | BODY MASS INDEX: 41.88 KG/M2 | WEIGHT: 299.16 LBS

## 2025-01-24 DIAGNOSIS — Z12.11 SCREENING FOR COLORECTAL CANCER: ICD-10-CM

## 2025-01-24 DIAGNOSIS — R12 HEARTBURN: ICD-10-CM

## 2025-01-24 DIAGNOSIS — Z12.12 SCREENING FOR COLORECTAL CANCER: ICD-10-CM

## 2025-01-24 LAB — GLUCOSE BLDC GLUCOMTR-MCNC: 119 MG/DL (ref 70–99)

## 2025-01-24 PROCEDURE — 25010000002 GLYCOPYRROLATE 0.2 MG/ML SOLUTION: Performed by: NURSE ANESTHETIST, CERTIFIED REGISTERED

## 2025-01-24 PROCEDURE — 25010000002 PROPOFOL 10 MG/ML EMULSION: Performed by: NURSE ANESTHETIST, CERTIFIED REGISTERED

## 2025-01-24 PROCEDURE — 82948 REAGENT STRIP/BLOOD GLUCOSE: CPT | Performed by: NURSE ANESTHETIST, CERTIFIED REGISTERED

## 2025-01-24 PROCEDURE — 88305 TISSUE EXAM BY PATHOLOGIST: CPT | Performed by: INTERNAL MEDICINE

## 2025-01-24 PROCEDURE — 43239 EGD BIOPSY SINGLE/MULTIPLE: CPT | Performed by: INTERNAL MEDICINE

## 2025-01-24 PROCEDURE — 25010000002 LIDOCAINE PF 2% 2 % SOLUTION: Performed by: NURSE ANESTHETIST, CERTIFIED REGISTERED

## 2025-01-24 PROCEDURE — 88342 IMHCHEM/IMCYTCHM 1ST ANTB: CPT | Performed by: INTERNAL MEDICINE

## 2025-01-24 PROCEDURE — 25810000003 LACTATED RINGERS PER 1000 ML

## 2025-01-24 PROCEDURE — 45385 COLONOSCOPY W/LESION REMOVAL: CPT | Performed by: INTERNAL MEDICINE

## 2025-01-24 RX ORDER — SODIUM CHLORIDE, SODIUM LACTATE, POTASSIUM CHLORIDE, CALCIUM CHLORIDE 600; 310; 30; 20 MG/100ML; MG/100ML; MG/100ML; MG/100ML
30 INJECTION, SOLUTION INTRAVENOUS CONTINUOUS
Status: DISCONTINUED | OUTPATIENT
Start: 2025-01-24 | End: 2025-01-24 | Stop reason: HOSPADM

## 2025-01-24 RX ORDER — PROPOFOL 10 MG/ML
VIAL (ML) INTRAVENOUS AS NEEDED
Status: DISCONTINUED | OUTPATIENT
Start: 2025-01-24 | End: 2025-01-24 | Stop reason: SURG

## 2025-01-24 RX ORDER — LIDOCAINE HYDROCHLORIDE 20 MG/ML
INJECTION, SOLUTION EPIDURAL; INFILTRATION; INTRACAUDAL; PERINEURAL AS NEEDED
Status: DISCONTINUED | OUTPATIENT
Start: 2025-01-24 | End: 2025-01-24 | Stop reason: SURG

## 2025-01-24 RX ORDER — GLYCOPYRROLATE 0.2 MG/ML
INJECTION INTRAMUSCULAR; INTRAVENOUS AS NEEDED
Status: DISCONTINUED | OUTPATIENT
Start: 2025-01-24 | End: 2025-01-24 | Stop reason: SURG

## 2025-01-24 RX ADMIN — PROPOFOL 250 MCG/KG/MIN: 10 INJECTION, EMULSION INTRAVENOUS at 08:39

## 2025-01-24 RX ADMIN — SODIUM CHLORIDE, POTASSIUM CHLORIDE, SODIUM LACTATE AND CALCIUM CHLORIDE: 600; 310; 30; 20 INJECTION, SOLUTION INTRAVENOUS at 08:35

## 2025-01-24 RX ADMIN — LIDOCAINE HYDROCHLORIDE 100 MG: 20 INJECTION, SOLUTION INTRAVENOUS at 08:38

## 2025-01-24 RX ADMIN — GLYCOPYRROLATE 0.2 MG: 0.2 INJECTION INTRAMUSCULAR; INTRAVENOUS at 08:40

## 2025-01-24 RX ADMIN — PROPOFOL 100 MG: 10 INJECTION, EMULSION INTRAVENOUS at 08:38

## 2025-01-24 NOTE — H&P
Pre Procedure History & Physical    Chief Complaint:   GERD, screening colonoscopy    Subjective     HPI:   55 yo M here for eval of GERD, screening colonoscopy.    Past Medical History:   Past Medical History:   Diagnosis Date    Acute medial meniscus tear of left knee 02/15/2018    Arthritis     Atrial fibrillation     Diabetes mellitus     Hyperlipemia     Hypertension     Limb swelling     Primary osteoarthritis of left knee 02/14/2018    Seasonal allergies     Sleep apnea        Past Surgical History:  Past Surgical History:   Procedure Laterality Date    COLONOSCOPY  2013    INTRAOCULAR LENS INSERTION      LASIK  2005       Family History:  Family History   Problem Relation Age of Onset    Diabetes Mother     Arthritis Mother     Cancer Father        Social History:   reports that he has never smoked. He has never used smokeless tobacco. He reports current alcohol use. He reports that he does not use drugs.    Medications:   Medications Prior to Admission   Medication Sig Dispense Refill Last Dose/Taking    dabigatran etexilate (PRADAXA) 150 MG capsu Take 1 capsule by mouth 2 (Two) Times a Day.   1/20/2025    empagliflozin (JARDIANCE) 25 MG tablet tablet Take 1 tablet by mouth Daily.   1/23/2025    famotidine (PEPCID) 20 MG tablet Take 1 tablet by mouth.   1/23/2025    fexofenadine (ALLEGRA) 180 MG tablet    1/23/2025    flecainide (TAMBOCOR) 50 MG tablet Take 1 tablet by mouth 2 (Two) Times a Day. 180 tablet 3 1/23/2025    hydroCHLOROthiazide 25 MG tablet    1/23/2025    metoprolol succinate XL (TOPROL-XL) 100 MG 24 hr tablet Take 1 tablet by mouth 2 (Two) Times a Day.   1/23/2025    mirtazapine (REMERON) 30 MG tablet Take 1 tablet by mouth Every Night.   Past Week    rosuvastatin (CRESTOR) 40 MG tablet Take 1 tablet by mouth Daily.   1/23/2025    Continuous Glucose Sensor (FreeStyle David 3 Sensor) misc        Dulaglutide 0.75 MG/0.5ML solution pen-injector Inject 0.75 mg under the skin into the appropriate  "area as directed.       ergocalciferol (ERGOCALCIFEROL) 1.25 MG (26854 UT) capsule Take 1 capsule by mouth 1 (One) Time Per Week.       glucose blood test strip 1 each by Other route Daily.       Insulin Glargine (LANTUS SOLOSTAR) 100 UNIT/ML injection pen Inject 45 Units under the skin into the appropriate area as directed 2 (Two) Times a Day for 30 days. 27 mL 0     Insulin Lispro, 1 Unit Dial, (HumaLOG KwikPen) 100 UNIT/ML solution pen-injector Inject 4-14 Units under the skin into the appropriate area as directed 3 (Three) Times a Day Before Meals for 30 days. Blood glucose 150-199 mg/dL - 4 units Blood glucose 200-249 mg/dL - 6 units Blood glucose 250-299 mg/dL - 8 units Blood glucose 300-349 mg/dL - 10 units Blood glucose 350-400 mg/dL - 12 units Blood glucose greater than 400 mg/dL - 14 units 13 mL 1     Insulin Pen Needle 31G X 6 MM misc Inject 1 each under the skin into the appropriate area as directed 3 (Three) Times a Day Before Meals. 100 each 0     metFORMIN ER (GLUCOPHAGE-XR) 500 MG 24 hr tablet Take 2 tablets by mouth Daily With Breakfast for 30 days. 60 tablet 0     multivitamin with minerals tablet tablet Take 1 tablet by mouth Daily.       OneTouch Delica Lancets 33G misc 1 each by Other route 3 (Three) Times a Day Before Meals. 100 each 0     tadalafil (CIALIS) 20 MG tablet Take 10-20 mg by mouth.          Allergies:  Compazine [prochlorperazine]    ROS:    Pertinent items are noted in HPI     Objective     Blood pressure 138/90, pulse 86, temperature 97.6 °F (36.4 °C), temperature source Temporal, resp. rate 22, height 180.3 cm (71\"), weight 136 kg (299 lb 2.6 oz), SpO2 97%.    Physical Exam   Constitutional: Pt is oriented to person, place, and time and well-developed, well-nourished, and in no distress.   Mouth/Throat: Oropharynx is clear and moist.   Neck: Normal range of motion.   Cardiovascular: Normal rate, regular rhythm and normal heart sounds.    Pulmonary/Chest: Effort normal and breath " sounds normal.   Abdominal: Soft. Nontender  Skin: Skin is warm and dry.   Psychiatric: Mood, memory, affect and judgment normal.     Assessment & Plan     Diagnosis:  GERD, screening colonoscopy    Anticipated Surgical Procedure:  EGD/colonoscopy    The risks, benefits, and alternatives of this procedure have been discussed with the patient or the responsible party- the patient understands and agrees to proceed.

## 2025-01-24 NOTE — ANESTHESIA POSTPROCEDURE EVALUATION
Patient: Tushar Castillo    Procedure Summary       Date: 01/24/25 Room / Location: [facility] ENDOSCOPY 1 / [facility] ENDOSCOPY    Anesthesia Start: 0835 Anesthesia Stop: 0914    Procedures:       ESOPHAGOGASTRODUODENOSCOPY      COLONOSCOPY Diagnosis:       Heartburn      Screening for colorectal cancer      (Heartburn [R12])      (Screening for colorectal cancer [Z12.11, Z12.12])    Surgeons: Regine Sheldon MD Provider: Kaylen Candelaria CRNA    Anesthesia Type: general ASA Status: 3            Anesthesia Type: general    Vitals  Vitals Value Taken Time   /80 01/24/25 0932   Temp 36.4 °C (97.6 °F) 01/24/25 0931   Pulse 76 01/24/25 0932   Resp 21 01/24/25 0931   SpO2 99 % 01/24/25 0932   Vitals shown include unfiled device data.        Post Anesthesia Care and Evaluation    Post-procedure mental status: acceptable.  Pain management: satisfactory to patient    Airway patency: patent  Anesthetic complications: No anesthetic complications    Cardiovascular status: acceptable  Respiratory status: acceptable    Comments: Per chart review

## 2025-01-29 DIAGNOSIS — A04.8 H. PYLORI INFECTION: Primary | ICD-10-CM

## 2025-01-29 LAB
CYTO UR: NORMAL
LAB AP CASE REPORT: NORMAL
LAB AP CLINICAL INFORMATION: NORMAL
LAB AP SPECIAL STAINS: NORMAL
PATH REPORT.FINAL DX SPEC: NORMAL
PATH REPORT.GROSS SPEC: NORMAL

## 2025-01-29 RX ORDER — BISMUTH SUBSALICYLATE 262 MG
524 TABLET,CHEWABLE ORAL 4 TIMES DAILY
Qty: 112 TABLET | Refills: 0 | Status: SHIPPED | OUTPATIENT
Start: 2025-01-29

## 2025-01-29 RX ORDER — PANTOPRAZOLE SODIUM 40 MG/1
40 TABLET, DELAYED RELEASE ORAL 2 TIMES DAILY
Qty: 28 TABLET | Refills: 0 | Status: SHIPPED | OUTPATIENT
Start: 2025-01-29

## 2025-01-29 RX ORDER — METRONIDAZOLE 500 MG/1
500 TABLET ORAL 3 TIMES DAILY
Qty: 42 TABLET | Refills: 0 | Status: SHIPPED | OUTPATIENT
Start: 2025-01-29 | End: 2025-02-12

## 2025-01-29 RX ORDER — DOXYCYCLINE 100 MG/1
100 CAPSULE ORAL 2 TIMES DAILY
Qty: 28 CAPSULE | Refills: 0 | Status: SHIPPED | OUTPATIENT
Start: 2025-01-29 | End: 2025-02-12

## 2025-01-31 ENCOUNTER — TELEPHONE (OUTPATIENT)
Dept: GASTROENTEROLOGY | Facility: CLINIC | Age: 55
End: 2025-01-31
Payer: OTHER GOVERNMENT

## 2025-01-31 NOTE — TELEPHONE ENCOUNTER
S/w patient  Patient aware of results  Patient placed in 3 year colon cancer recall   Breath test instructions mailed to patient     Patient needs a follow up with christa but next available is in August. Can you assist with scheduling patient a follow up?

## 2025-01-31 NOTE — TELEPHONE ENCOUNTER
----- Message from Regine Sheldon sent at 1/29/2025  3:26 PM EST -----  Recall colonoscopy in 3 years.    H.pylori infection.  Rx sent for doxycycline, flagyl, bismuth, and pantoprazole x 14 days.  Breath test in 6 weeks.  Order placed.    Please arrange f/u with NP.

## 2025-02-05 ENCOUNTER — OFFICE VISIT (OUTPATIENT)
Dept: CARDIOLOGY | Facility: CLINIC | Age: 55
End: 2025-02-05
Payer: OTHER GOVERNMENT

## 2025-02-05 VITALS
WEIGHT: 301 LBS | HEART RATE: 66 BPM | HEIGHT: 71 IN | SYSTOLIC BLOOD PRESSURE: 136 MMHG | DIASTOLIC BLOOD PRESSURE: 74 MMHG | BODY MASS INDEX: 42.14 KG/M2

## 2025-02-05 DIAGNOSIS — E78.5 DYSLIPIDEMIA: ICD-10-CM

## 2025-02-05 DIAGNOSIS — I10 ESSENTIAL HYPERTENSION: ICD-10-CM

## 2025-02-05 DIAGNOSIS — I48.0 PAROXYSMAL ATRIAL FIBRILLATION: Primary | ICD-10-CM

## 2025-02-05 PROCEDURE — 99214 OFFICE O/P EST MOD 30 MIN: CPT | Performed by: INTERNAL MEDICINE

## 2025-02-05 PROCEDURE — 93000 ELECTROCARDIOGRAM COMPLETE: CPT | Performed by: INTERNAL MEDICINE

## 2025-02-05 RX ORDER — AMLODIPINE BESYLATE 10 MG/1
40 TABLET ORAL DAILY
COMMUNITY

## 2025-02-05 NOTE — ASSESSMENT & PLAN NOTE
Continue with flecainide 50 twice daily and Toprol 100 twice daily dosing patient symptomatically maintained normal sinus rhythm EKG within normal limits today.  Patient is also on Pradaxa 150 twice daily for CVA prophy

## 2025-02-05 NOTE — PROGRESS NOTES
Chief Complaint  Follow-up    Subjective    Patient symptomatically not been having tachycardic issues no sustained palpitation issues.  Denies any chest pain or shortness of breath  Past Medical History:   Diagnosis Date    Acute medial meniscus tear of left knee 02/15/2018    Arthritis     Atrial fibrillation     Diabetes mellitus     Hyperlipemia     Hypertension     Limb swelling     Primary osteoarthritis of left knee 02/14/2018    Seasonal allergies     Sleep apnea          Current Outpatient Medications:     amLODIPine (NORVASC) 10 MG tablet, Take 4 tablets by mouth Daily., Disp: , Rfl:     Continuous Glucose Sensor (FreeStyle David 3 Sensor) misc, , Disp: , Rfl:     dabigatran etexilate (PRADAXA) 150 MG capsu, Take 1 capsule by mouth 2 (Two) Times a Day., Disp: , Rfl:     Dulaglutide 0.75 MG/0.5ML solution pen-injector, Inject 0.75 mg under the skin into the appropriate area as directed., Disp: , Rfl:     empagliflozin (JARDIANCE) 25 MG tablet tablet, Take 1 tablet by mouth Daily., Disp: , Rfl:     ergocalciferol (ERGOCALCIFEROL) 1.25 MG (95893 UT) capsule, Take 1 capsule by mouth 1 (One) Time Per Week., Disp: , Rfl:     fexofenadine (ALLEGRA) 180 MG tablet, , Disp: , Rfl:     flecainide (TAMBOCOR) 50 MG tablet, Take 1 tablet by mouth 2 (Two) Times a Day., Disp: 180 tablet, Rfl: 3    glucose blood test strip, 1 each by Other route Daily., Disp: , Rfl:     hydroCHLOROthiazide 25 MG tablet, , Disp: , Rfl:     Insulin Glargine (LANTUS SOLOSTAR) 100 UNIT/ML injection pen, Inject 45 Units under the skin into the appropriate area as directed 2 (Two) Times a Day for 30 days., Disp: 27 mL, Rfl: 0    Insulin Lispro, 1 Unit Dial, (HumaLOG KwikPen) 100 UNIT/ML solution pen-injector, Inject 4-14 Units under the skin into the appropriate area as directed 3 (Three) Times a Day Before Meals for 30 days. Blood glucose 150-199 mg/dL - 4 units Blood glucose 200-249 mg/dL - 6 units Blood glucose 250-299 mg/dL - 8 units Blood  glucose 300-349 mg/dL - 10 units Blood glucose 350-400 mg/dL - 12 units Blood glucose greater than 400 mg/dL - 14 units, Disp: 13 mL, Rfl: 1    Insulin Pen Needle 31G X 6 MM misc, Inject 1 each under the skin into the appropriate area as directed 3 (Three) Times a Day Before Meals., Disp: 100 each, Rfl: 0    metFORMIN ER (GLUCOPHAGE-XR) 500 MG 24 hr tablet, Take 2 tablets by mouth Daily With Breakfast for 30 days., Disp: 60 tablet, Rfl: 0    metoprolol succinate XL (TOPROL-XL) 100 MG 24 hr tablet, Take 1 tablet by mouth 2 (Two) Times a Day., Disp: , Rfl:     mirtazapine (REMERON) 30 MG tablet, Take 1 tablet by mouth Every Night., Disp: , Rfl:     multivitamin with minerals tablet tablet, Take 1 tablet by mouth Daily., Disp: , Rfl:     OneTouch Delica Lancets 33G misc, 1 each by Other route 3 (Three) Times a Day Before Meals., Disp: 100 each, Rfl: 0    rosuvastatin (CRESTOR) 40 MG tablet, Take 1 tablet by mouth Daily., Disp: , Rfl:     tadalafil (CIALIS) 20 MG tablet, Take 10-20 mg by mouth., Disp: , Rfl:     Bismuth 262 MG chewable tablet, Chew 2 tablets 4 (Four) Times a Day. (Patient not taking: Reported on 2/5/2025), Disp: 112 tablet, Rfl: 0    doxycycline (VIBRAMYCIN) 100 MG capsule, Take 1 capsule by mouth 2 (Two) Times a Day for 14 days. (Patient not taking: Reported on 2/5/2025), Disp: 28 capsule, Rfl: 0    famotidine (PEPCID) 20 MG tablet, Take 1 tablet by mouth. (Patient not taking: Reported on 2/5/2025), Disp: , Rfl:     metroNIDAZOLE (FLAGYL) 500 MG tablet, Take 1 tablet by mouth 3 (Three) Times a Day for 14 days. (Patient not taking: Reported on 2/5/2025), Disp: 42 tablet, Rfl: 0    pantoprazole (PROTONIX) 40 MG EC tablet, Take 1 tablet by mouth 2 (Two) Times a Day. (Patient not taking: Reported on 2/5/2025), Disp: 28 tablet, Rfl: 0    There are no discontinued medications.  Allergies   Allergen Reactions    Compazine [Prochlorperazine] Paresthesia     Jaws        Social History     Tobacco Use    Smoking  "status: Never    Smokeless tobacco: Never   Vaping Use    Vaping status: Never Used   Substance Use Topics    Alcohol use: Yes     Alcohol/week: 2.0 standard drinks of alcohol     Types: 2 Glasses of wine per week     Comment: LIGHT    Drug use: Never       Family History   Problem Relation Age of Onset    Diabetes Mother     Arthritis Mother     Cancer Father     Hypertension Brother         Objective     /74   Pulse 66   Ht 180.3 cm (71\")   Wt (!) 137 kg (301 lb)   BMI 41.98 kg/m²       Physical Exam    General Appearance:   no acute distress  Alert and oriented x3  HENT:   lips not cyanotic  Atraumatic  Neck:  No jvd   supple  Respiratory:  no respiratory distress  normal breath sounds  no rales  Cardiovascular:  Regular rate and rhythm  no S3, no S4   no murmur  no rub  Extremities  No cyanosis  lower extremity edema: none    Skin:   warm, dry  No rashes      Result Review :     proBNP   Date Value Ref Range Status   04/25/2024 59.2 0.0 - 900.0 pg/mL Final     CMP          4/26/2024    01:52 4/26/2024    04:05 4/26/2024    07:42 4/26/2024    11:55 4/26/2024    16:05 4/26/2024    19:53 4/27/2024    00:02 4/27/2024    04:01 4/27/2024    07:55   CMP   Glucose 284  243  259  201  182  128  147  106  160    BUN 6  5  5  5  4  4  3  3  3    Creatinine 1.17  1.21  1.14  1.07  1.03  0.94  0.87  0.96  0.88    EGFR 74.1  71.2  76.4  82.5  86.3  96.3  102.5  93.9  102.2    Sodium 135  136  137  135  134  135  135  136  136    Potassium 5.2  4.5  4.4  3.7  3.2  3.5  3.4  3.6  3.3    Chloride 100  102  103  104  103  104  105  106  107    Calcium 9.4  9.2  8.9  8.7  9.3  9.0  8.8  8.8  8.4    Total Protein 9.4  8.8           Albumin 4.3  4.2           Globulin 5.1  4.6           Total Bilirubin 0.2  0.2           Alkaline Phosphatase 128  121           AST (SGOT) 22  19           ALT (SGPT) 23  22           Albumin/Globulin Ratio 0.8  0.9           BUN/Creatinine Ratio 5.1  4.1  4.4  4.7  3.9  4.3  3.4  3.1  3.4  " "  Anion Gap 29.9  27.2  24.8  16.0  15.0  13.6  13.7  12.8  12.4          4/28/2024    05:07   CMP   Glucose 281    BUN 3    Creatinine 0.81    EGFR 104.8    Sodium 137    Potassium 3.4    Chloride 106    Calcium 9.0    Total Protein    Albumin    Globulin    Total Bilirubin    Alkaline Phosphatase    AST (SGOT)    ALT (SGPT)    Albumin/Globulin Ratio    BUN/Creatinine Ratio 3.7    Anion Gap 13.5      CBC w/diff          4/26/2024    01:52 4/27/2024    04:01 4/28/2024    05:07   CBC w/Diff   WBC 15.83  9.60  6.94    RBC 5.36  4.20  4.23    Hemoglobin 15.2  12.0  12.0    Hematocrit 46.7  35.3  34.9    MCV 87.1  84.0  82.5    MCH 28.4  28.6  28.4    MCHC 32.5  34.0  34.4    RDW 14.0  13.6  13.6    Platelets 235  172  153    Neutrophil Rel % 75.1  47.0  44.0    Immature Granulocyte Rel % 0.4  0.3  0.1    Lymphocyte Rel % 16.0  37.2  37.0    Monocyte Rel % 8.2  12.8  14.0    Eosinophil Rel % 0.0  2.3  4.5    Basophil Rel % 0.3  0.4  0.4       No results found for: \"TSH\"   No results found for: \"FREET4\"   No results found for: \"DDIMERQUANT\"  Magnesium   Date Value Ref Range Status   04/28/2024 1.9 1.6 - 2.6 mg/dL Final      No results found for: \"DIGOXIN\"   Lab Results   Component Value Date    TROPONINT 17 04/25/2024             No results found for: \"POCTROP\"         ECG 12 Lead    Date/Time: 2/5/2025 3:52 PM  Performed by: Matias Thayer MD    Authorized by: Matias Thayer MD  Comparison: compared with previous ECG   Similar to previous ECG  Rhythm: sinus rhythm                 Diagnoses and all orders for this visit:    1. Paroxysmal atrial fibrillation (Primary)  Assessment & Plan:  Continue with flecainide 50 twice daily and Toprol 100 twice daily dosing patient symptomatically maintained normal sinus rhythm EKG within normal limits today.  Patient is also on Pradaxa 150 twice daily for CVA prophy    Orders:  -     ECG 12 Lead    2. Dyslipidemia    3. Essential hypertension  Assessment & Plan:  Blood pressure remains " controlled on Toprol 100 twice daily and amlodipine 10 daily dose               Follow Up     Return in about 6 months (around 8/5/2025) for Follow with Em Monroe, EKG with F/U.          Patient was given instructions and counseling regarding his condition or for health maintenance advice. Please see specific information pulled into the AVS if appropriate.

## 2025-02-17 ENCOUNTER — APPOINTMENT (OUTPATIENT)
Dept: GENERAL RADIOLOGY | Facility: HOSPITAL | Age: 55
End: 2025-02-17
Payer: OTHER GOVERNMENT

## 2025-02-17 ENCOUNTER — HOSPITAL ENCOUNTER (EMERGENCY)
Facility: HOSPITAL | Age: 55
Discharge: HOME | End: 2025-02-17
Attending: EMERGENCY MEDICINE | Admitting: EMERGENCY MEDICINE
Payer: OTHER GOVERNMENT

## 2025-02-17 VITALS
SYSTOLIC BLOOD PRESSURE: 135 MMHG | HEART RATE: 91 BPM | WEIGHT: 300 LBS | TEMPERATURE: 98.2 F | RESPIRATION RATE: 20 BRPM | BODY MASS INDEX: 42 KG/M2 | DIASTOLIC BLOOD PRESSURE: 81 MMHG | HEIGHT: 71 IN | OXYGEN SATURATION: 96 %

## 2025-02-17 DIAGNOSIS — M19.079 ARTHRITIS OF METATARSOPHALANGEAL (MTP) JOINT OF GREAT TOE: Primary | ICD-10-CM

## 2025-02-17 LAB
BASOPHILS # BLD AUTO: 0.04 10*3/MM3 (ref 0–0.2)
BASOPHILS NFR BLD AUTO: 0.4 % (ref 0–1.5)
CRP SERPL-MCNC: 2.01 MG/DL (ref 0–0.5)
D DIMER PPP FEU-MCNC: <0.27 MCGFEU/ML (ref 0–0.55)
DEPRECATED RDW RBC AUTO: 43.2 FL (ref 37–54)
EOSINOPHIL # BLD AUTO: 0.51 10*3/MM3 (ref 0–0.4)
EOSINOPHIL NFR BLD AUTO: 5.1 % (ref 0.3–6.2)
ERYTHROCYTE [DISTWIDTH] IN BLOOD BY AUTOMATED COUNT: 14.1 % (ref 12.3–15.4)
ERYTHROCYTE [SEDIMENTATION RATE] IN BLOOD: 55 MM/HR (ref 0–20)
HCT VFR BLD AUTO: 44.6 % (ref 37.5–51)
HGB BLD-MCNC: 14.4 G/DL (ref 13–17.7)
HOLD SPECIMEN: NORMAL
HOLD SPECIMEN: NORMAL
IMM GRANULOCYTES # BLD AUTO: 0.03 10*3/MM3 (ref 0–0.05)
IMM GRANULOCYTES NFR BLD AUTO: 0.3 % (ref 0–0.5)
LYMPHOCYTES # BLD AUTO: 2.96 10*3/MM3 (ref 0.7–3.1)
LYMPHOCYTES NFR BLD AUTO: 29.8 % (ref 19.6–45.3)
MCH RBC QN AUTO: 27.3 PG (ref 26.6–33)
MCHC RBC AUTO-ENTMCNC: 32.3 G/DL (ref 31.5–35.7)
MCV RBC AUTO: 84.6 FL (ref 79–97)
MONOCYTES # BLD AUTO: 0.94 10*3/MM3 (ref 0.1–0.9)
MONOCYTES NFR BLD AUTO: 9.5 % (ref 5–12)
NEUTROPHILS NFR BLD AUTO: 5.44 10*3/MM3 (ref 1.7–7)
NEUTROPHILS NFR BLD AUTO: 54.9 % (ref 42.7–76)
NRBC BLD AUTO-RTO: 0 /100 WBC (ref 0–0.2)
PLATELET # BLD AUTO: 276 10*3/MM3 (ref 140–450)
PMV BLD AUTO: 9.6 FL (ref 6–12)
RBC # BLD AUTO: 5.27 10*6/MM3 (ref 4.14–5.8)
URATE SERPL-MCNC: 5.9 MG/DL (ref 3.4–7)
WBC NRBC COR # BLD AUTO: 9.92 10*3/MM3 (ref 3.4–10.8)
WHOLE BLOOD HOLD COAG: NORMAL
WHOLE BLOOD HOLD SPECIMEN: NORMAL

## 2025-02-17 PROCEDURE — 73620 X-RAY EXAM OF FOOT: CPT

## 2025-02-17 PROCEDURE — 99283 EMERGENCY DEPT VISIT LOW MDM: CPT

## 2025-02-17 PROCEDURE — 86140 C-REACTIVE PROTEIN: CPT

## 2025-02-17 PROCEDURE — 85379 FIBRIN DEGRADATION QUANT: CPT

## 2025-02-17 PROCEDURE — 84550 ASSAY OF BLOOD/URIC ACID: CPT

## 2025-02-17 PROCEDURE — 85025 COMPLETE CBC W/AUTO DIFF WBC: CPT

## 2025-02-17 PROCEDURE — 85652 RBC SED RATE AUTOMATED: CPT

## 2025-02-17 PROCEDURE — 36415 COLL VENOUS BLD VENIPUNCTURE: CPT

## 2025-02-17 RX ORDER — PREDNISONE 50 MG/1
50 TABLET ORAL DAILY
Qty: 5 TABLET | Refills: 0 | Status: SHIPPED | OUTPATIENT
Start: 2025-02-17

## 2025-02-17 RX ORDER — NAPROXEN 500 MG/1
500 TABLET ORAL 2 TIMES DAILY
Qty: 30 TABLET | Refills: 0 | Status: SHIPPED | OUTPATIENT
Start: 2025-02-17

## 2025-02-17 NOTE — ED PROVIDER NOTES
Time: 9:11 AM EST  Date of encounter:  2/17/2025  Independent Historian/Clinical History and Information was obtained by:   Patient    History is limited by: N/A    Chief Complaint: Toe pain      History of Present Illness:  Patient is a 54 y.o. year old male who presents to the emergency department for evaluation of toe pain.  Patient presents emergency department today complaining of left great toe pain.  He states that he can move the joint.  He states that he woke up Sunday morning with the pain but went ahead and went to [employer] as he was able to ambulate the pain has continued to worsen since then.  He is having swelling and warmth of the area.  Patient has not had any drainage, denies any wound.  Denies any fever.  Patient voices concern because she has a history of septic joint in the right ankle and his symptoms started off similar.  Patient does state he has been on medication for gout in the past but is unsure what it was.      Patient Care Team  Primary Care Provider: Lashell Quinn APRN    Past Medical History:     Allergies   Allergen Reactions    Compazine [Prochlorperazine] Paresthesia     Jaws     Past Medical History:   Diagnosis Date    Acute medial meniscus tear of left knee 02/15/2018    Arthritis     Atrial fibrillation     Diabetes mellitus     Hyperlipemia     Hypertension     Limb swelling     Primary osteoarthritis of left knee 02/14/2018    Seasonal allergies     Sleep apnea      Past Surgical History:   Procedure Laterality Date    COLONOSCOPY  2013    COLONOSCOPY N/A 1/24/2025    Procedure: COLONOSCOPY;  Surgeon: Regine Sheldon MD;  Location: [facility] ENDOSCOPY;  Service: Gastroenterology;  Laterality: N/A;  DIVERTICULOSIS COLON POLYP X 2 CLIPS HEMORRHOIDS    ENDOSCOPY N/A 1/24/2025    Procedure: ESOPHAGOGASTRODUODENOSCOPY;  Surgeon: Regine Sheldon MD;  Location: [facility] ENDOSCOPY;  Service: Gastroenterology;  Laterality: N/A;  GASTRITIS    INTRAOCULAR LENS INSERTION       LASIK  2005     Family History   Problem Relation Age of Onset    Diabetes Mother     Arthritis Mother     Cancer Father     Hypertension Brother        Home Medications:  Prior to Admission medications    Medication Sig Start Date End Date Taking? Authorizing Provider   amLODIPine (NORVASC) 10 MG tablet Take 4 tablets by mouth Daily.    Alex Dye MD   Bismuth 262 MG chewable tablet Chew 2 tablets 4 (Four) Times a Day.  Patient not taking: Reported on 2/5/2025 1/29/25   Regine Sheldon MD   Continuous Glucose Sensor (FreeStyle David 3 Sensor) misc  7/30/24   Alex Dye MD   dabigatran etexilate (PRADAXA) 150 MG capsu Take 1 capsule by mouth 2 (Two) Times a Day.    Alex Dye MD   Dulaglutide 0.75 MG/0.5ML solution pen-injector Inject 0.75 mg under the skin into the appropriate area as directed. 7/10/24   Alex Dye MD   empagliflozin (JARDIANCE) 25 MG tablet tablet Take 1 tablet by mouth Daily. 5/28/24   Alex Dey MD   ergocalciferol (ERGOCALCIFEROL) 1.25 MG (64557 UT) capsule Take 1 capsule by mouth 1 (One) Time Per Week.    Alex Dye MD   famotidine (PEPCID) 20 MG tablet Take 1 tablet by mouth.  Patient not taking: Reported on 2/5/2025 5/28/24 5/28/25  Alex Dye MD   fexofenadine (ALLEGRA) 180 MG tablet  7/30/24   Alex Dye MD   flecainide (TAMBOCOR) 50 MG tablet Take 1 tablet by mouth 2 (Two) Times a Day. 8/7/24   Em Monroe APRN   glucose blood test strip 1 each by Other route Daily. 7/2/24 7/2/25  Alex Dye MD   hydroCHLOROthiazide 25 MG tablet  7/30/24   Alex Dye MD   Insulin Glargine (LANTUS SOLOSTAR) 100 UNIT/ML injection pen Inject 45 Units under the skin into the appropriate area as directed 2 (Two) Times a Day for 30 days. 4/28/24 2/5/25  Jamie Pena MD   Insulin Lispro, 1 Unit Dial, (HumaLOG KwikPen) 100 UNIT/ML solution pen-injector Inject 4-14 Units under the  skin into the appropriate area as directed 3 (Three) Times a Day Before Meals for 30 days. Blood glucose 150-199 mg/dL - 4 units Blood glucose 200-249 mg/dL - 6 units Blood glucose 250-299 mg/dL - 8 units Blood glucose 300-349 mg/dL - 10 units Blood glucose 350-400 mg/dL - 12 units Blood glucose greater than 400 mg/dL - 14 units 4/28/24 2/5/25  Jamie Pena MD   Insulin Pen Needle 31G X 6 MM misc Inject 1 each under the skin into the appropriate area as directed 3 (Three) Times a Day Before Meals. 4/28/24   Jamie Pena MD   metFORMIN ER (GLUCOPHAGE-XR) 500 MG 24 hr tablet Take 2 tablets by mouth Daily With Breakfast for 30 days. 4/28/24 2/5/25  Jamie Pena MD   metoprolol succinate XL (TOPROL-XL) 100 MG 24 hr tablet Take 1 tablet by mouth 2 (Two) Times a Day.    ProviderAlex MD   mirtazapine (REMERON) 30 MG tablet Take 1 tablet by mouth Every Night.    ProviderAlex MD   multivitamin with minerals tablet tablet Take 1 tablet by mouth Daily.    Alex Dye MD   OneTouch Delica Lancets 33G misc 1 each by Other route 3 (Three) Times a Day Before Meals. 4/28/24   Jamie Pena MD   pantoprazole (PROTONIX) 40 MG EC tablet Take 1 tablet by mouth 2 (Two) Times a Day.  Patient not taking: Reported on 2/5/2025 1/29/25   Regine Sheldon MD   rosuvastatin (CRESTOR) 40 MG tablet Take 1 tablet by mouth Daily. 9/26/23   Alex Dye MD   tadalafil (CIALIS) 20 MG tablet Take 10-20 mg by mouth. 5/28/24 5/28/25  Alex Dye MD        Social History:   Social History     Tobacco Use    Smoking status: Never    Smokeless tobacco: Never   Vaping Use    Vaping status: Never Used   Substance Use Topics    Alcohol use: Yes     Alcohol/week: 2.0 standard drinks of alcohol     Types: 2 Glasses of wine per week     Comment: LIGHT    Drug use: Never         Review of Systems:  Review of Systems   Constitutional:  Negative for fever.   Musculoskeletal:  Positive for arthralgias  "and joint swelling.   Skin:  Negative for color change and wound.        Physical Exam:  /81 (BP Location: Right arm, Patient Position: Lying)   Pulse 91   Temp 98.2 °F (36.8 °C) (Oral)   Resp 20   Ht 180.3 cm (71\")   Wt 136 kg (300 lb)   SpO2 96%   BMI 41.84 kg/m²     Physical Exam  Vitals and nursing note reviewed.   Constitutional:       General: He is not in acute distress.     Appearance: Normal appearance. He is not ill-appearing, toxic-appearing or diaphoretic.   HENT:      Head: Normocephalic and atraumatic.      Nose: Nose normal.   Eyes:      Conjunctiva/sclera: Conjunctivae normal.   Cardiovascular:      Rate and Rhythm: Normal rate and regular rhythm.   Pulmonary:      Effort: Pulmonary effort is normal.      Breath sounds: Normal breath sounds.   Abdominal:      General: Abdomen is flat. There is no distension.   Musculoskeletal:         General: Normal range of motion.      Cervical back: Normal range of motion and neck supple.      Right foot: Normal.      Left foot: Normal range of motion and normal capillary refill. Swelling (Swelling of the left MTP) and tenderness present. No deformity, bunion, Charcot foot, foot drop, prominent metatarsal heads, laceration, bony tenderness or crepitus. Normal pulse.      Comments: DP pulse 2+ bilaterally   Skin:     General: Skin is warm and dry.   Neurological:      General: No focal deficit present.      Mental Status: He is alert and oriented to person, place, and time.   Psychiatric:         Mood and Affect: Mood normal.         Behavior: Behavior normal.         Thought Content: Thought content normal.         Judgment: Judgment normal.                  Medical Decision Making:    Comorbidities that affect care:    Hypertension, hyperlipidemia, diabetes mellitus, atrial fibrillation    External Notes reviewed:    Previous Clinic Note: Patient last seen by family medicine on 2-      The following orders were placed and all results were " independently analyzed by me:  Orders Placed This Encounter   Procedures    XR Foot 2 View Left    Plato Draw    Sedimentation Rate    C-reactive Protein    Uric Acid    CBC Auto Differential    D-dimer, Quantitative    CBC & Differential    Green Top (Gel)    Lavender Top    Gold Top - SST    Light Blue Top       Medications Given in the Emergency Department:  Medications - No data to display     ED Course:         Labs:    Lab Results (last 24 hours)       Procedure Component Value Units Date/Time    CBC & Differential [[id number]]  (Abnormal) Collected: 02/17/25 0925    Specimen: Blood from Arm, Left Updated: 02/17/25 0933    Narrative:      The following orders were created for panel order CBC & Differential.  Procedure                               Abnormality         Status                     ---------                               -----------         ------                     CBC Auto Differential[[id number]]        Abnormal            Final result                 Please view results for these tests on the individual orders.    Sedimentation Rate [[id number]]  (Abnormal) Collected: 02/17/25 0925    Specimen: Blood from Arm, Left Updated: 02/17/25 0937     Sed Rate 55 mm/hr     C-reactive Protein [[id number]]  (Abnormal) Collected: 02/17/25 0925    Specimen: Blood from Arm, Left Updated: 02/17/25 0947     C-Reactive Protein 2.01 mg/dL     Uric Acid [[id number]]  (Normal) Collected: 02/17/25 0925    Specimen: Blood from Arm, Left Updated: 02/17/25 0947     Uric Acid 5.9 mg/dL     CBC Auto Differential [[id number]]  (Abnormal) Collected: 02/17/25 0925    Specimen: Blood from Arm, Left Updated: 02/17/25 0933     WBC 9.92 10*3/mm3      RBC 5.27 10*6/mm3      Hemoglobin 14.4 g/dL      Hematocrit 44.6 %      MCV 84.6 fL      MCH 27.3 pg      MCHC 32.3 g/dL      RDW 14.1 %      RDW-SD 43.2 fl      MPV 9.6 fL      Platelets 276 10*3/mm3      Neutrophil % 54.9 %      Lymphocyte % 29.8 %      Monocyte % 9.5 %       "Eosinophil % 5.1 %      Basophil % 0.4 %      Immature Grans % 0.3 %      Neutrophils, Absolute 5.44 10*3/mm3      Lymphocytes, Absolute 2.96 10*3/mm3      Monocytes, Absolute 0.94 10*3/mm3      Eosinophils, Absolute 0.51 10*3/mm3      Basophils, Absolute 0.04 10*3/mm3      Immature Grans, Absolute 0.03 10*3/mm3      nRBC 0.0 /100 WBC     D-dimer, Quantitative [[id number]]  (Normal) Collected: 02/17/25 0925    Specimen: Blood from Arm, Left Updated: 02/17/25 1015     D-Dimer, Quantitative <0.27 MCGFEU/mL     Narrative:      According to the assay 's published package insert, a normal (<0.50 MCGFEU/mL) D-dimer result in conjunction with a non-high clinical probability assessment, excludes deep vein thrombosis (DVT) and pulmonary embolism (PE) with high sensitivity.    D-dimer values increase with age and this can make VTE exclusion of an older population difficult. To address this, the [employer], based on best available evidence and recent guidelines, recommends that clinicians use age-adjusted D-dimer thresholds in patients greater than 50 years of age with: a) a low probability of PE who do not meet all Pulmonary Embolism Rule Out Criteria, or b) in those with intermediate probability of PE.   The formula for an age-adjusted D-dimer cut-off is \"age/100\".  For example, a 60 year old patient would have an age-adjusted cut-off of 0.60 MCGFEU/mL and an 80 year old 0.80 MCGFEU/mL.             Imaging:    XR Foot 2 View Left    Result Date: 2/17/2025  XR FOOT 2 VW LEFT Date of Exam: 2/17/2025 9:12 AM EST Indication: Great toe pain pain Comparison: None available. Findings: There are radiographic changes of early osteoarthritis at the first MTP joint. The bony structures appear otherwise intact and in normal alignment. No fractures or destructive lesions are identified.     Impression: Early osteoarthritis at the first MTP joint. Electronically Signed: Matias Alexander MD  2/17/2025 9:31 AM " EST  Workstation ID: [id number]       Differential Diagnosis and Discussion:    Joint Pain: Differential diagnosis includes but is not limited to polyarticular arthritis, gout, tendinitis, hemarthrosis, septic arthritis, rheumatoid arthritis, bursitis, degenerative joint disease, joint effusion, autoimmune disorder, trauma, and occult neoplasm.    PROCEDURES:    Labs were collected in the emergency department and all labs were reviewed and interpreted by me.  X-ray were performed in the emergency department and all X-ray impressions were independently interpreted by me.    No orders to display       Procedures    MDM  Number of Diagnoses or Management Options  Arthritis of metatarsophalangeal (MTP) joint of great toe  Diagnosis management comments: Patient presented to the emergency department today for evaluation of left great toe pain.  CBC notes normal white blood cell count and hemoglobin hematocrit.  Inflammatory markers of ESR and CRP are elevated.  Uric acid is negative.  X-ray had findings of arthritis but no other acute findings.  When uric acid was negative D-dimer was added to ensure there was no laboratory finding of occlusion and this is negative.  Will start patient on naproxen and prednisone.  Patient was educated to monitor blood sugar closely due to the prescription of prednisone.  Return to the emergency department guidelines were provided.       Amount and/or Complexity of Data Reviewed  Clinical lab tests: reviewed and ordered  Tests in the radiology section of CPT®: reviewed and ordered  Decide to obtain previous medical records or to obtain history from someone other than the patient: yes    Risk of Complications, Morbidity, and/or Mortality  Presenting problems: moderate  Diagnostic procedures: low  Management options: low    Patient Progress  Patient progress: stable       Patient Care Considerations:    ANTIBIOTICS: I considered prescribing antibiotics as an outpatient however no bacterial  focus of infection was found.      Consultants/Shared Management Plan:    None    Social Determinants of Health:    Patient is independent, reliable, and has access to care.       Disposition and Care Coordination:    Discharged: The patient is suitable and stable for discharge with no need for consideration of admission.    I have explained the patient´s condition, diagnoses and treatment plan based on the information available to me at this time. I have answered questions and addressed any concerns. The patient has a good  understanding of the patient´s diagnosis, condition, and treatment plan as can be expected at this point. The vital signs have been stable. The patient´s condition is stable and appropriate for discharge from the emergency department.      The patient will pursue further outpatient evaluation with the primary care physician or other designated or consulting physician as outlined in the discharge instructions. They are agreeable to this plan of care and follow-up instructions have been explained in detail. The patient has received these instructions in written format and has expressed an understanding of the discharge instructions. The patient is aware that any significant change in condition or worsening of symptoms should prompt an immediate return to this or the closest emergency department or call to 911.  I have explained discharge medications and the need for follow up with the patient/caretakers. This was also printed in the discharge instructions. Patient was discharged with the following medications and follow up:      Medication List        New Prescriptions      naproxen 500 MG tablet  Commonly known as: NAPROSYN  Take 1 tablet by mouth 2 (Two) Times a Day.     predniSONE 50 MG tablet  Commonly known as: DELTASONE  Take 1 tablet by mouth Daily.               Where to Get Your Medications        These medications were sent to [id number] - [place] [place] - [address] AT [facility] - [phone]  - [phone] FX  [address] [address]      Phone: [phone]   naproxen 500 MG tablet  predniSONE 50 MG tablet      Lashell Quinn, APRN  [address]  [address]  [place] [id number] [phone]             Final diagnoses:   Arthritis of metatarsophalangeal (MTP) joint of great toe        ED Disposition       ED Disposition   Discharge    Condition   Stable    Comment   --               This medical record created using voice recognition software.             Ghanshyam Tellez PA-C  02/17/25 1026

## 2025-02-17 NOTE — DISCHARGE INSTRUCTIONS
Your labs today are negative for any infection.  Your inflammatory markers are elevated.  You are negative for any blood clot.  Please take the steroid and anti-inflammatory medication as prescribed to improve your symptoms.  Monitor your blood sugar closely as the steroids can cause your blood sugar to elevate and if your blood sugar begins to remain too high or you are having issues controlling it please stop the steroids.  Please also try to apply ice to the area 15 to 20 minutes at a time 4-5 times a day.  Return for new or worsening symptoms.

## 2025-03-03 ENCOUNTER — TELEPHONE (OUTPATIENT)
Dept: GASTROENTEROLOGY | Facility: CLINIC | Age: 55
End: 2025-03-03
Payer: OTHER GOVERNMENT

## 2025-03-03 NOTE — TELEPHONE ENCOUNTER
Spoke to patient about his overdue H Pylori breath test. Told him he can have this done at the [facility] the [facility] and to try to get it done before his follow up on 03/11/2025. Patient voiced an understanding.

## 2025-03-07 ENCOUNTER — LAB (OUTPATIENT)
Facility: HOSPITAL | Age: 55
End: 2025-03-07
Payer: OTHER GOVERNMENT

## 2025-03-07 DIAGNOSIS — A04.8 H. PYLORI INFECTION: ICD-10-CM

## 2025-03-07 PROCEDURE — 83013 H PYLORI (C-13) BREATH: CPT

## 2025-03-09 LAB — UREA BREATH TEST QL: NEGATIVE

## 2025-03-11 ENCOUNTER — OFFICE VISIT (OUTPATIENT)
Dept: GASTROENTEROLOGY | Facility: CLINIC | Age: 55
End: 2025-03-11
Payer: OTHER GOVERNMENT

## 2025-03-11 VITALS
BODY MASS INDEX: 42.96 KG/M2 | WEIGHT: 308 LBS | DIASTOLIC BLOOD PRESSURE: 76 MMHG | HEART RATE: 71 BPM | SYSTOLIC BLOOD PRESSURE: 129 MMHG

## 2025-03-11 DIAGNOSIS — K29.50 OTHER CHRONIC GASTRITIS WITHOUT HEMORRHAGE: Primary | ICD-10-CM

## 2025-03-11 DIAGNOSIS — K64.8 INTERNAL HEMORRHOIDS: ICD-10-CM

## 2025-03-11 DIAGNOSIS — Z86.19 HISTORY OF HELICOBACTER PYLORI INFECTION: ICD-10-CM

## 2025-03-11 DIAGNOSIS — D12.2 ADENOMATOUS POLYP OF ASCENDING COLON: ICD-10-CM

## 2025-03-11 DIAGNOSIS — K57.90 DIVERTICULOSIS: ICD-10-CM

## 2025-03-11 PROCEDURE — 99213 OFFICE O/P EST LOW 20 MIN: CPT | Performed by: NURSE PRACTITIONER

## 2025-03-11 RX ORDER — SILDENAFIL 100 MG/1
50-100 TABLET, FILM COATED ORAL
COMMUNITY
Start: 2025-02-12 | End: 2026-02-12

## 2025-03-11 NOTE — PROGRESS NOTES
Chief Complaint     Colonoscopy (Follow up) and EGD    Patient or patient representative verbalized consent for the use of Ambient Listening during the visit with  TORIN Andres for chart documentation. 3/11/2025  12:00 EDT      History of Present Illness       History of Present Illness  The patient presents for a follow-up of heartburn and abnormal imaging of the GI tract. His last office visit was on 06/11/2024.    He reports an improvement in his bowel movements, which were previously characterized by diarrhea. He is not experiencing any heartburn or difficulty swallowing. There is no presence of blood in his stool. He continues to take pantoprazole and does not require a refill at this time.    He was diagnosed with diabetes in December 2024, with an initial blood sugar level of approximately 300, which has since been regulated. He has been informed that his current medication regimen may lead to weight gain, which he is keen to avoid. He plans to discuss increasing his physical activity with his fiancee. He currently engages in walking as part of his work routine and aims to incorporate additional walking post-work, targeting a distance of 2 miles. He expresses a desire to maintain his weight between 250 and 260 pounds, as he has always been a larger individual, even during his  service. He is on metformin and insulin injections.               History      Past Medical History:   Diagnosis Date    Acute medial meniscus tear of left knee 02/15/2018    Arthritis     Atrial fibrillation     Diabetes mellitus     Hyperlipemia     Hypertension     Limb swelling     Primary osteoarthritis of left knee 02/14/2018    Seasonal allergies     Sleep apnea      Past Surgical History:   Procedure Laterality Date    COLONOSCOPY  2013    COLONOSCOPY N/A 1/24/2025    Procedure: COLONOSCOPY;  Surgeon: Regine Sheldon MD;  Location: formerly [facility] ENDOSCOPY;  Service: Gastroenterology;  Laterality: N/A;   DIVERTICULOSIS COLON POLYP X 2 CLIPS HEMORRHOIDS    ENDOSCOPY N/A 1/24/2025    Procedure: ESOPHAGOGASTRODUODENOSCOPY;  Surgeon: Regine Sheldon MD;  Location: [facility] ENDOSCOPY;  Service: Gastroenterology;  Laterality: N/A;  GASTRITIS    INTRAOCULAR LENS INSERTION      LASIK  2005     Family History   Problem Relation Age of Onset    Diabetes Mother     Arthritis Mother     Cancer Father     Hypertension Brother         Current Medications       Current Outpatient Medications:     amLODIPine (NORVASC) 10 MG tablet, Take 4 tablets by mouth Daily., Disp: , Rfl:     Continuous Glucose Sensor (FreeStyle David 3 Sensor) misc, , Disp: , Rfl:     dabigatran etexilate (PRADAXA) 150 MG capsu, Take 1 capsule by mouth 2 (Two) Times a Day., Disp: , Rfl:     Dulaglutide 0.75 MG/0.5ML solution pen-injector, Inject 0.75 mg under the skin into the appropriate area as directed., Disp: , Rfl:     empagliflozin (JARDIANCE) 25 MG tablet tablet, Take 1 tablet by mouth Daily., Disp: , Rfl:     ergocalciferol (ERGOCALCIFEROL) 1.25 MG (33589 UT) capsule, Take 1 capsule by mouth 1 (One) Time Per Week., Disp: , Rfl:     fexofenadine (ALLEGRA) 180 MG tablet, , Disp: , Rfl:     flecainide (TAMBOCOR) 50 MG tablet, Take 1 tablet by mouth 2 (Two) Times a Day., Disp: 180 tablet, Rfl: 3    glucose blood test strip, 1 each by Other route Daily., Disp: , Rfl:     hydroCHLOROthiazide 25 MG tablet, , Disp: , Rfl:     Insulin Glargine (LANTUS SOLOSTAR) 100 UNIT/ML injection pen, Inject 45 Units under the skin into the appropriate area as directed 2 (Two) Times a Day for 30 days., Disp: 27 mL, Rfl: 0    Insulin Lispro, 1 Unit Dial, (HumaLOG KwikPen) 100 UNIT/ML solution pen-injector, Inject 4-14 Units under the skin into the appropriate area as directed 3 (Three) Times a Day Before Meals for 30 days. Blood glucose 150-199 mg/dL - 4 units Blood glucose 200-249 mg/dL - 6 units Blood glucose 250-299 mg/dL - 8 units Blood glucose 300-349 mg/dL - 10  units Blood glucose 350-400 mg/dL - 12 units Blood glucose greater than 400 mg/dL - 14 units, Disp: 13 mL, Rfl: 1    Insulin Pen Needle 31G X 6 MM misc, Inject 1 each under the skin into the appropriate area as directed 3 (Three) Times a Day Before Meals., Disp: 100 each, Rfl: 0    metFORMIN ER (GLUCOPHAGE-XR) 500 MG 24 hr tablet, Take 2 tablets by mouth Daily With Breakfast for 30 days., Disp: 60 tablet, Rfl: 0    metoprolol succinate XL (TOPROL-XL) 100 MG 24 hr tablet, Take 1 tablet by mouth 2 (Two) Times a Day., Disp: , Rfl:     mirtazapine (REMERON) 30 MG tablet, Take 1 tablet by mouth Every Night., Disp: , Rfl:     multivitamin with minerals tablet tablet, Take 1 tablet by mouth Daily., Disp: , Rfl:     naproxen (NAPROSYN) 500 MG tablet, Take 1 tablet by mouth 2 (Two) Times a Day., Disp: 30 tablet, Rfl: 0    OneTouch Delica Lancets 33G misc, 1 each by Other route 3 (Three) Times a Day Before Meals., Disp: 100 each, Rfl: 0    rosuvastatin (CRESTOR) 40 MG tablet, Take 1 tablet by mouth Daily., Disp: , Rfl:     sildenafil (VIAGRA) 100 MG tablet, Take 0.5-1 tablets by mouth., Disp: , Rfl:      Allergies     Allergies   Allergen Reactions    Compazine [Prochlorperazine] Paresthesia     Jaws       Social History       Social History     Social History Narrative    Not on file         Objective       /76 (BP Location: Left arm, Patient Position: Sitting, Cuff Size: Large Adult)   Pulse 71   Wt (!) 140 kg (308 lb)   BMI 42.96 kg/m²       Physical Exam    Results       Result Review :    The following data was reviewed by: TORIN Andres on 03/11/2025:            Results  Laboratory Studies  EGD performed on 01/24/2025 revealed irregularity of the Z-line at the GE junction with mild changes suggestive of reflux esophagitis, negative for intestinal metaplasia. Diffuse mild inflammation characterized by erythema was found in the gastric antrum biopsy with chronic active gastritis, positive for H.  pylori. First and second portion of the duodenum were normal. Colonoscopy revealed normal perianal and digital rectal exam, 10 mm polyp in the ascending colon tubular adenoma completely removed. Diverticulosis throughout the colon, grade 2 internal hemorrhoids. Recommend repeat colonoscopy in 3 years due to the size of colon polyp removed. Repeat breath test performed on 03/07/2025 was negative.           Assessment and Plan              Diagnoses and all orders for this visit:    1. Other chronic gastritis without hemorrhage (Primary)    2. History of Helicobacter pylori infection    3. Adenomatous polyp of ascending colon    4. Internal hemorrhoids    5. Diverticulosis        Assessment & Plan  1. Helicobacter pylori infection.  He was previously treated for H. pylori with bismuth, pantoprazole, metronidazole, and doxycycline. A repeat breath test performed on 03/07/2025 was negative, indicating the infection has been successfully eradicated. He is advised to monitor for any new symptoms such as rectal bleeding, unexplained weight loss, or new abdominal pain and to report these immediately.    2. Reflux esophagitis.  An EGD performed on 01/24/2025 showed irregularity of the Z-line at the GE junction with mild changes suggestive of reflux esophagitis, negative for intestinal metaplasia. He reports no current symptoms of heartburn or difficulty swallowing. He will continue taking pantoprazole as needed.    3. Chronic active gastritis.  The gastric antrum biopsy from the EGD showed diffuse mild inflammation characterized by erythema, consistent with chronic active gastritis, and was positive for H. pylori. The H. pylori infection has been treated and eradicated. He is advised to continue monitoring for any gastrointestinal symptoms and report any new or worsening symptoms.    4. Colon polyp.  A colonoscopy revealed a 10 mm tubular adenoma in the ascending colon, which was completely removed. Given the size of the  polyp, a repeat colonoscopy is recommended in 3 years. He will be notified when it is time to schedule the procedure.    5. Diverticulosis.  The colonoscopy also revealed diverticulosis throughout the colon. He reports normal bowel movements and no blood in his stool. He is advised to maintain a high-fiber diet and stay hydrated to manage diverticulosis.    6. Grade 2 internal hemorrhoids.  The colonoscopy identified grade 2 internal hemorrhoids. He reports no current symptoms of rectal bleeding. He is advised to avoid straining during bowel movements and to use over-the-counter hemorrhoid creams. He is advised to increase his physical activity, aiming for 15 to 30 minutes of cardio daily.      PROCEDURE  EGD was performed on 01/24/2025. Colonoscopy revealed a 10 mm polyp in the ascending colon, which was completely removed.            Follow Up     Follow Up   Return if symptoms worsen or fail to improve.  Patient was given instructions and counseling regarding his condition or for health maintenance advice. Please see specific information pulled into the AVS if appropriate.

## 2025-08-06 ENCOUNTER — OFFICE VISIT (OUTPATIENT)
Dept: CARDIOLOGY | Facility: CLINIC | Age: 55
End: 2025-08-06
Payer: OTHER GOVERNMENT

## 2025-08-06 VITALS
WEIGHT: 293.8 LBS | SYSTOLIC BLOOD PRESSURE: 128 MMHG | HEART RATE: 72 BPM | HEIGHT: 71 IN | DIASTOLIC BLOOD PRESSURE: 82 MMHG | BODY MASS INDEX: 41.13 KG/M2

## 2025-08-06 DIAGNOSIS — E78.5 DYSLIPIDEMIA: ICD-10-CM

## 2025-08-06 DIAGNOSIS — I48.0 PAROXYSMAL ATRIAL FIBRILLATION: Primary | ICD-10-CM

## 2025-08-06 DIAGNOSIS — I10 ESSENTIAL HYPERTENSION: ICD-10-CM

## 2025-08-06 PROBLEM — E11.10 DKA (DIABETIC KETOACIDOSIS): Status: RESOLVED | Noted: 2024-04-26 | Resolved: 2025-08-06

## 2025-08-06 PROCEDURE — 93000 ELECTROCARDIOGRAM COMPLETE: CPT | Performed by: NURSE PRACTITIONER

## 2025-08-06 PROCEDURE — 99214 OFFICE O/P EST MOD 30 MIN: CPT | Performed by: NURSE PRACTITIONER

## 2025-08-06 RX ORDER — FLECAINIDE ACETATE 50 MG/1
50 TABLET ORAL 2 TIMES DAILY
Qty: 180 TABLET | Refills: 3 | Status: SHIPPED | OUTPATIENT
Start: 2025-08-06

## (undated) DEVICE — SOL IRRG H2O PL/BG 1000ML STRL

## (undated) DEVICE — SOLIDIFIER LIQLOC PLS 1500CC BT

## (undated) DEVICE — THE STERILE LIGHT HANDLE COVER IS USED WITH STERIS SURGICAL LIGHTING AND VISUALIZATION SYSTEMS.

## (undated) DEVICE — LINER SURG CANSTR SXN S/RIGD 1500CC

## (undated) DEVICE — BLCK/BITE BLOX WO/DENTL/RIM W/STRAP 54F

## (undated) DEVICE — SINGLE-USE BIOPSY FORCEPS: Brand: RADIAL JAW 4

## (undated) DEVICE — Device: Brand: DEFENDO AIR/WATER/SUCTION AND BIOPSY VALVE

## (undated) DEVICE — CONN JET HYDRA H20 AUXILIARY DISP

## (undated) DEVICE — Device

## (undated) DEVICE — DEV CLIP ENDO RESOLUTION360 CONTRL ROT 235CM
Type: IMPLANTABLE DEVICE | Site: COLON | Status: NON-FUNCTIONAL
Removed: 2025-01-24